# Patient Record
Sex: FEMALE | Race: WHITE | NOT HISPANIC OR LATINO | Employment: OTHER | URBAN - METROPOLITAN AREA
[De-identification: names, ages, dates, MRNs, and addresses within clinical notes are randomized per-mention and may not be internally consistent; named-entity substitution may affect disease eponyms.]

---

## 2017-03-09 PROBLEM — E88.810 INSULIN RESISTANCE SYNDROME: Status: ACTIVE | Noted: 2017-03-09

## 2017-03-23 DIAGNOSIS — K21.9 GASTROESOPHAGEAL REFLUX DISEASE, ESOPHAGITIS PRESENCE NOT SPECIFIED: Primary | ICD-10-CM

## 2017-08-18 DIAGNOSIS — K21.9 GASTROESOPHAGEAL REFLUX DISEASE, ESOPHAGITIS PRESENCE NOT SPECIFIED: ICD-10-CM

## 2018-09-10 DIAGNOSIS — K21.9 GASTROESOPHAGEAL REFLUX DISEASE, ESOPHAGITIS PRESENCE NOT SPECIFIED: ICD-10-CM

## 2019-01-07 PROBLEM — M47.814 SPONDYLOSIS OF THORACIC REGION WITHOUT MYELOPATHY OR RADICULOPATHY: Status: ACTIVE | Noted: 2019-01-07

## 2019-01-30 PROBLEM — M54.17 LUMBOSACRAL RADICULOPATHY: Status: ACTIVE | Noted: 2019-01-30

## 2019-08-01 ENCOUNTER — TELEPHONE (OUTPATIENT)
Dept: GASTROENTEROLOGY | Facility: CLINIC | Age: 39
End: 2019-08-01

## 2019-08-01 NOTE — TELEPHONE ENCOUNTER
----- Message from Rebecca Mensah sent at 8/1/2019  1:21 PM CDT -----  Contact: Pt Portal Request    From: Miri Earl     Sent: 7/30/2019 10:05 AM CDT       To: Patient Appointment Schedule Request Mailing List  Subject: Appointment Request    Appointment Request From: Miri Earl    With Provider: arun pérez    Preferred Date Range: Any    Preferred Times: Any time    Reason for visit: time for visit    Comments:  if doing EGD/colonoscopy can it get done before Sept 1st.

## 2019-10-10 DIAGNOSIS — K21.9 GASTROESOPHAGEAL REFLUX DISEASE, ESOPHAGITIS PRESENCE NOT SPECIFIED: ICD-10-CM

## 2020-09-03 ENCOUNTER — TELEPHONE (OUTPATIENT)
Dept: GASTROENTEROLOGY | Facility: CLINIC | Age: 40
End: 2020-09-03

## 2020-09-08 ENCOUNTER — TELEPHONE (OUTPATIENT)
Dept: GASTROENTEROLOGY | Facility: CLINIC | Age: 40
End: 2020-09-08

## 2020-09-14 ENCOUNTER — DOCUMENTATION ONLY (OUTPATIENT)
Dept: GASTROENTEROLOGY | Facility: CLINIC | Age: 40
End: 2020-09-14

## 2020-09-14 ENCOUNTER — PATIENT MESSAGE (OUTPATIENT)
Dept: GASTROENTEROLOGY | Facility: CLINIC | Age: 40
End: 2020-09-14

## 2020-09-14 ENCOUNTER — OFFICE VISIT (OUTPATIENT)
Dept: GASTROENTEROLOGY | Facility: CLINIC | Age: 40
End: 2020-09-14
Payer: COMMERCIAL

## 2020-09-14 ENCOUNTER — TELEPHONE (OUTPATIENT)
Dept: GASTROENTEROLOGY | Facility: CLINIC | Age: 40
End: 2020-09-14

## 2020-09-14 VITALS — WEIGHT: 290 LBS | HEIGHT: 68 IN | BODY MASS INDEX: 43.95 KG/M2

## 2020-09-14 DIAGNOSIS — Z90.49 S/P CHOLECYSTECTOMY: ICD-10-CM

## 2020-09-14 DIAGNOSIS — R13.14 PHARYNGOESOPHAGEAL DYSPHAGIA: Primary | ICD-10-CM

## 2020-09-14 DIAGNOSIS — R07.9 NONSPECIFIC CHEST PAIN: ICD-10-CM

## 2020-09-14 DIAGNOSIS — R19.7 DIARRHEA, UNSPECIFIED TYPE: ICD-10-CM

## 2020-09-14 DIAGNOSIS — R12 HEARTBURN: ICD-10-CM

## 2020-09-14 DIAGNOSIS — R79.89 ELEVATED LFTS: ICD-10-CM

## 2020-09-14 DIAGNOSIS — Z86.010 HISTORY OF COLON POLYPS: ICD-10-CM

## 2020-09-14 DIAGNOSIS — Z87.898 HISTORY OF SHORTNESS OF BREATH: ICD-10-CM

## 2020-09-14 PROCEDURE — 99204 PR OFFICE/OUTPT VISIT, NEW, LEVL IV, 45-59 MIN: ICD-10-PCS | Mod: 95,,, | Performed by: NURSE PRACTITIONER

## 2020-09-14 PROCEDURE — 99204 OFFICE O/P NEW MOD 45 MIN: CPT | Mod: 95,,, | Performed by: NURSE PRACTITIONER

## 2020-09-14 RX ORDER — MONTELUKAST SODIUM 4 MG/1
1 TABLET, CHEWABLE ORAL 2 TIMES DAILY
Qty: 60 TABLET | Refills: 2 | Status: SHIPPED | OUTPATIENT
Start: 2020-09-14 | End: 2020-12-04 | Stop reason: SDUPTHER

## 2020-09-14 RX ORDER — ESOMEPRAZOLE MAGNESIUM 40 MG/1
40 CAPSULE, DELAYED RELEASE ORAL
Qty: 30 CAPSULE | Refills: 3 | Status: SHIPPED | OUTPATIENT
Start: 2020-09-14 | End: 2020-12-04 | Stop reason: SDUPTHER

## 2020-09-14 NOTE — PATIENT INSTRUCTIONS
Uncertain Causes of Diarrhea (Adult)    Diarrhea is when stools are loose and watery. This can be caused by:  · Viral infections  · Bacterial infections  · Food poisoning  · Parasites  · Irritable bowel syndrome (IBS)  · Inflammatory bowel diseases such as ulcerative colitis, Crohn's disease, and celiac disease  · Food intolerance, such as to lactose, the sugar found in milk and milk products  · Reaction to medicines like antibiotics, laxatives, cancer drugs, and antacids  Along with diarrhea, you may also have:  · Abdominal pain and cramping  · Nausea and vomiting  · Loss of bowel control  · Fever and chills  · Bloody stools  In some cases, antibiotics may help to treat diarrhea. You may have a stool sample test. This is done to see what is causing your diarrhea, and if antibiotics will help treat it. The results of a stool sample test may take up to 2 days. The healthcare provider may not give you antibiotics until he or she has the stool test results.  Diarrhea can cause dehydration. This is the loss of too much water and other fluids from the body. When this occurs, body fluid must be replaced. This can be done with oral rehydration solutions. Oral rehydration solutions are available at drugstores and grocery stores without a prescription.  Home care  Follow all instructions given by your healthcare provider. Rest at home for the next 24 hours, or until you feel better. Avoid caffeine, tobacco, and alcohol. These can make diarrhea, cramping, and pain worse.  If taking medicines:  · Dont take over-the-counter diarrhea or nausea medicines unless your healthcare provider tells you to.  · You may use acetaminophen or NSAID medicines like ibuprofen or naproxen to reduce pain and fever. Dont use these if you have chronic liver or kidney disease, or ever had a stomach ulcer or gastrointestinal bleeding. Don't use NSAID medicines if you are already taking one for another condition (like arthritis) or are on daily  aspirin therapy (such as for heart disease or after a stroke). Talk with your healthcare provider first.  · If antibiotics were prescribed, be sure you take them until they are finished. Dont stop taking them even when you feel better. Antibiotics must be taken as a full course.  To prevent the spread of illness:  · Remember that washing with soap and water and using alcohol-based  is the best way to prevent the spread of infection.  · Clean the toilet after each use.  · Wash your hands before eating.  · Wash your hands before and after preparing food. Keep in mind that people with diarrhea or vomiting should not prepare food for others.  · Wash your hands after using cutting boards, countertops, and knives that have been in contact with raw foods.  · Wash and then peel fruits and vegetables.  · Keep uncooked meats away from cooked and ready-to-eat foods.  · Use a food thermometer when cooking. Cook poultry to at least 165°F (74°C). Cook ground meat (beef, veal, pork, lamb) to at least 160°F (71°C). Cook fresh beef, veal, lamb, and pork to at least 145°F (63°C).  · Dont eat raw or undercooked eggs (poached or narayan side up), poultry, meat, or unpasteurized milk and juices.  Food and drinks  The main goal while treating vomiting or diarrhea is to prevent dehydration. This is done by taking small amounts of liquids often.  · Keep in mind that liquids are more important than food right now.  · Drink only small amounts of liquids at a time.  · Dont force yourself to eat, especially if you are having cramping, vomiting, or diarrhea. Dont eat large amounts at a time, even if you are hungry.  · If you eat, avoid fatty, greasy, spicy, or fried foods.  · Dont eat dairy foods or drink milk if you have diarrhea. These can make diarrhea worse.  During the first 24 hours you can try:  · Oral rehydration solutions. Do not use sports drinks. They have too much sugar and not enough electrolytes.  · Soft drinks without  caffeine  · Ginger ale  · Water (plain or flavored)  · Decaf tea or coffee  · Clear broth, consommé, or bouillon  · Gelatin, popsicles, or frozen fruit juice bars  The second 24 hours, if you are feeling better, you can add:  · Hot cereal, plain toast, bread, rolls, or crackers  · Plain noodles, rice, mashed potatoes, chicken noodle soup, or rice soup  · Unsweetened canned fruit (no pineapple)  · Bananas  As you recover:  · Limit fat intake to less than 15 grams per day. Dont eat margarine, butter, oils, mayonnaise, sauces, gravies, fried foods, peanut butter, meat, poultry, or fish.  · Limit fiber. Dont eat raw or cooked vegetables, fresh fruits except bananas, or bran cereals.  · Limit caffeine and chocolate.  · Limit dairy.  · Dont use spices or seasonings except salt.  · Go back to your normal diet over time, as you feel better and your symptoms improve.  · If the symptoms come back, go back to a simple diet or clear liquids.  Follow-up care  Follow up with your healthcare provider, or as advised. If a stool sample was taken or cultures were done, call the healthcare provider for the results as instructed.  Call 911  Call 911 if you have any of these symptoms:  · Trouble breathing  · Confusion  · Extreme drowsiness or trouble walking  · Loss of consciousness  · Rapid heart rate  · Chest pain  · Stiff neck  · Seizure  When to seek medical advice  Call your healthcare provider right away if any of these occur:  · Abdominal pain that gets worse  · Constant lower right abdominal pain  · Continued vomiting and inability to keep liquids down  · Diarrhea more than 5 times a day  · Blood in vomit or stool  · Dark urine or no urine for 8 hours, dry mouth and tongue, tiredness, weakness, or dizziness  · Drowsiness  · New rash  · You dont get better in 2 to 3 days  · Fever of 100.4°F (38°C) or higher that doesnt get lower with medicine  Date Last Reviewed: 1/3/2016  © 2867-7948 [employer]. [address] [place] [address]  [place] [address]. All rights reserved. This information is not intended as a substitute for professional medical care. Always follow your healthcare professional's instructions.          Irritable Bowel Syndrome    Irritable bowel syndrome (IBS) is a disorder of the intestines. It is not a disease, but a group of symptoms caused by changes in the way the intestines work. It is fairly common, but the cause is not well understood.  Symptoms of IBS include:  · Abdominal pain, discomfort, and cramping  · Diarrhea  · Constipation or dry, hard stools  · Mucous stool  · Bloating  · Feeling of incomplete bowel movements  It usually results in one of 3 patterns of symptoms:  · Chronic abdominal pain and constipation  · Recurring episodes of diarrhea, with or without pain  · Alternating diarrhea and constipation  Home care  The goal of treatment is to control and relieve your symptoms, so you can lead a full and active life. There is no cure for IBS. But it can be managed.  Diet  Your diet did not cause your IBS, but it can affect it. No one diet works for everyone. Finding the best foods for you may take trial and error. Keep a food log to help find what foods made your symptoms worse. Below are some tips that may help you.  · Eat more slowly. Eat smaller amounts at a time, but more often. Remember, you can always eat more, but cannot eat less once youve eaten too much.  · High-fiber foods are complicated. While they may help relieve constipation, they can make your bloating, cramping, gas, and diarrhea worse.  · Eat less sugar.  · Try cutting out dairy products. Sometimes this helps.  · Try cutting out foods that are high in fat and fatty meats.  · You can control bloating or passing excess gas. Be careful with gassy vegetables and fruits like beans, cabbage, broccoli, and cauliflower.  · Be careful with carbonated beverages and fruit juices. They can make your bloating and diarrhea worse.  · Caffeine,  alcohol, and stimulants can make symptoms worse. These include coffee, tea, sodas, energy drinks, and chocolate.  Lifestyle  · Look for factors that seem to worsen your symptoms. These include stress and emotions.   · Although stress does not cause IBS, it may trigger flare-ups. Counseling can help you learn to handle stress. So can self-help measures like exercise, yoga, and meditation.  · Depression can occur along with IBS. Your healthcare provider may prescribe antidepressant medicine. This may help with diarrhea, constipation, and cramping, as well as with symptoms of depression.  · Smoking doesn't cause IBS, but can make the symptoms worse.  Medicines  Your healthcare provider may prescribe medicines. Take them as directed. For acute flare-ups of your illness, your provider may give you prescription medicines.  · Check with your healthcare provider before taking any medicines for diarrhea.  · Avoid anti-inflammatory medicines like ibuprofen or naproxen.  · Consider nutritional supplements. This is especially true if your diarrhea is prolonged, or you aren't eating or are losing weight  Follow-up care  Follow up with your healthcare provider, or as advised. If a stool sample was taken or cultures were done, you will be told if your treatment needs to change. You can call as directed for the results.  When to seek medical advice  Call your healthcare provider right away if any of these occur:  · Abdominal pain gets worse  · Constant abdominal pain moves to the right-lower abdomen  · You can't keep liquids down because of vomiting  · You have severe diarrhea  · You have blood (red or black color) or mucus in your stool  · You feel very weak or dizzy, faint, or have extreme thirst  · You have a fever of 100.4ºF (38.0ºC) or higher, or as directed by your healthcare provider  Date Last Reviewed: 8/31/2015  © 4735-1132 [employer]. [address] [place] [address], [place] [address]. All rights reserved. This  information is not intended as a substitute for professional medical care. Always follow your healthcare professional's instructions.          GERD (Adult)    The esophagus is a tube that carries food from the mouth to the stomach. A valve at the lower end of the esophagus prevents stomach acid from flowing upward. When this valve doesn't work properly, stomach contents may repeatedly flow back up (reflux) into the esophagus. This is called gastroesophageal reflux disease (GERD). GERD can irritate the esophagus. It can cause problems with swallowing or breathing. In severe cases, GERD can cause recurrent pneumonia or other serious problems.  Symptoms of reflux include burning, pressure or sharp pain in the upper abdomen or mid to lower chest. The pain can spread to the neck, back, or shoulder. There may be belching, an acid taste in the back of the throat, chronic cough, or sore throat or hoarseness. GERD symptoms often occur during the day after a big meal. They can also occur at night when lying down.   Home care  Lifestyle changes can help reduce symptoms. If needed, medicines may be prescribed. Symptoms often improve with treatment, but if treatment is stopped, the symptoms often return after a few months. So most persons with GERD will need to continue treatment.  Lifestyle changes  · Limit or avoid fatty, fried, and spicy foods, as well as coffee, chocolate, mint, and foods with high acid content such as tomatoes and citrus fruit and juices (orange, grapefruit, lemon).  · Dont eat large meals, especially at night. Frequent, smaller meals are best. Do not lie down right after eating. And dont eat anything 3 hours before going to bed.  · Avoid drinking alcohol and smoking. As much as possible, stay away from second hand smoke.  · If you are overweight, losing weight will reduce symptoms.   · Avoid wearing tight clothing around your stomach area.  · If your symptoms occur during sleep, use a foam wedge to  "elevate your upper body (not just your head.) Or, place 4" blocks under the head of your bed.  Medicines  If needed, medicines can help relieve the symptoms of GERD and prevent damage to the esophagus. Discuss a medicine plan with your healthcare provider. This may include one or more of the following medicines:  · Antacids to help neutralize the normal acids in your stomach.  · Acid blockers (H2 blockers) to decrease acid production.  · Acid inhibitors (PPIs) to decrease acid production in a different way than the blockers. They may work better, but can take a little longer to take effect.  Take an antacid 30-60 minutes after eating and at bedtime, but not at the same time as an acid blocker.  Try not to take medicines such as ibuprofen and aspirin. If you are taking aspirin for your heart or other medical reasons, talk to your healthcare provider about stopping it.  Follow-up care  Follow up with your healthcare provider or as advised by our staff.  When to seek medical advice  Call your healthcare provider if any of the following occur:  · Stomach pain gets worse or moves to the lower right abdomen (appendix area)  · Chest pain appears or gets worse, or spreads to the back, neck, shoulder, or arm  · Frequent vomiting (cant keep down liquids)  · Blood in the stool or vomit (red or black in color)  · Feeling weak or dizzy  · Fever of 100.4ºF (38ºC) or higher, or as directed by your healthcare provider  Date Last Reviewed: 6/23/2015  © 2324-1524 [employer]. [address] [place] [address]. All rights reserved. This information is not intended as a substitute for professional medical care. Always follow your healthcare professional's instructions.          Discharge Instructions: Eating a Soft Diet  You have been prescribed a soft diet (also called gastrointestinal soft diet or bland diet). This reduces the amount of work your digestive tract has to do. It also reduces the chance that your " digestive tract will be irritated by the food you eat. A soft diet is prescribed for people with digestive problems. The diet consists of foods that are tender, mildly seasoned, and easy to digest. While on this diet, you should not eat fried or spicy foods, or raw fruits and vegetables. Also avoid alcoholic beverages.  General guidelines  · Eat in a calm, relaxed atmosphere. How you eat may be as important as what you eat. Dont rush while eating. Chew your food slowly and thoroughly, and swallow slowly.  · Eat small frequent meals throughout the day, but dont eat within 2 hours of bedtime.  · Avoid any foods that cause discomfort.  · Dont use NSAIDs (nonsteroidal anti-inflammatory drugs), such as aspirin, and ibuprofen. Also avoid medicine that contain aspirin. NSAIDs can cause ulcers and delay or prevent ulcer healing.  · Use antacids as needed, but keep in mind that magnesium-containing antacids may cause diarrhea.  Foods to eat  · Cream of wheat and cream of rice  · Cooked white rice  · Mashed potatoes, and boiled potatoes without skin  · Plain pasta and noodles  · Plain white crackers (such as no-salt soda crackers)  · White bread  · Applesauce  · Cooked fruits without skins or seeds  · Mild juices, such as apple and grape  · Bananas  · Cooked or mashed vegetables without stems and seeds  ? Carrots  ? Summer squash (zucchini, yellow squash)  ? Winter squash (acorn, butternut, spaghetti squash)  · Cottage cheese  · Mild hard or soft cheeses  · Custard  · Yogurt without seeds or nuts  · Milk (you may need lactose-free milk)  · Ice cream without seeds or nuts  · Smooth peanut butter  · Eggs  · Fish, turkey, chicken, or other meat that is not tough or stringy  · Tofu  Foods to avoid  · Nuts and seeds  · Snack foods, such as the following:  ? Chocolate-containing snacks, candy, pastries, or cakes.  ? Potato chips (plain, barbecued, or other flavors)  ? Taco chips or nachos  ? Corn chips  ? Popcorn, popcorn cakes,  or rice cakes  ? Crackers with nuts, seeds, or spicy seasonings  ? French fries  · Fried or greasy foods  · Whole-grain breads, rolls, and crackers  · Breads and rolls with nuts, seeds, or bran  · Bran and granola cereals  · Berries with seeds, such as strawberries, raspberries, and blackberries  · Acidic fruits, such as oranges, grapefruits, jai, limes, and pineapples  · Raw vegetables  · Mild or hot peppers  · Sauerkraut and pickled vegetables  · Tomatoes or tomato products, such as tomato paste, tomato sauce, and tomato juice  · Barbecue sauce  · Spicy or flavored cheeses, such as jalapeño and black pepper cheese  · Crunchy peanut butter  · Dried cooked beans, such as ochoa, kidney, or navy beans  · The following meats:  ? Fried or greasy meats  ? Processed, spicy meats, such as sausage, corcoran, ham, and lunch meats  ? Ribs and other meats with barbecue sauce  ? Tough or stringy meats, such as corned beef or beef jerky  Fluids to avoid  · Alcoholic beverages  · Coffee and regular teas  · Юлия and other drinks with caffeine  · Cranberry, orange, pineapple, and grapefruit juice  · Lemonade  · Vegetable juice  · Whole milk, if you are lactose intolerant  Follow-up  Make a follow-up appointment with a dietitian as directed by our staff.  Date Last Reviewed: 6/21/2015  © 5274-8076 [employer]. [address] [place] [address]. All rights reserved. This information is not intended as a substitute for professional medical care. Always follow your healthcare professional's instructions.

## 2020-09-14 NOTE — PROGRESS NOTES
Subjective:       Patient ID: Miri Earl is a 40 y.o. female Body mass index is 44.09 kg/m².    Chief Complaint: GI Problem (dysphagia)    This patient is new to me.  Established patient of Dr. Colunga (last in 2014).     The patient location is: home in [place]. I verified patient name and date of birth. Patient provided current height and weight measurements.  The chief complaint leading to consultation is: heartburn, dysphagia and due for colonoscopy    Visit type: audiovisual    Face to Face time with patient: 17 minutes  32 minutes of total time spent on the encounter, which includes face to face time and non-face to face time preparing to see the patient (eg, review of tests), Obtaining and/or reviewing separately obtained history, Documenting clinical information in the electronic or other health record, Independently interpreting results (not separately reported) and communicating results to the patient/family/caregiver, or Care coordination (not separately reported).     Each patient to whom he or she provides medical services by telemedicine is:  (1) informed of the relationship between the physician and patient and the respective role of any other health care provider with respect to management of the patient; and (2) notified that he or she may decline to receive medical services by telemedicine and may withdraw from such care at any time.    GI Problem  The primary symptoms include nausea (occasional; zofran prn) and diarrhea. Primary symptoms do not include fever, weight loss (gained weight recently), fatigue, abdominal pain, vomiting, melena, hematemesis or hematochezia.   The diarrhea began more than 1 week ago (chornic, reports diagnosed with IBS in the past by Dr. Colunga). The diarrhea is watery. Daily occurrences: 2-8 times a day; worse with dairy (she avoids dairy) Risk factors: denies recent antibiotic/hospitalization, foreign travel, ill contacts, or suspect food intake.   The illness is also  significant for dysphagia (occurs with food & pills; denies problems with liquids; egd with dilation helped in the past). The illness does not include chills, odynophagia or constipation. Significant associated medical issues include GERD (recurred since off zantac, occurs anytime she eats; prilosec 40 mg once daily; PAST; zantac stopped in 4/2020 due to FDA recall, nexium helped but stopped in the past due to cost (wants to retry it)) and irritable bowel syndrome. Associated medical issues do not include inflammatory bowel disease.     Review of Systems   Constitutional: Negative for appetite change, chills, fatigue, fever and weight loss (gained weight recently).        Reports takes norco PRN- takes maybe once every 2 months; also has pain pump in place with morphine   HENT: Positive for trouble swallowing. Negative for sore throat.    Respiratory: Positive for shortness of breath (intermittent, uses inhalers; denies currently). Negative for cough and choking.    Cardiovascular: Positive for chest pain (occasional; seeing cardiologist; denies currently).   Gastrointestinal: Positive for diarrhea, dysphagia (occurs with food & pills; denies problems with liquids; egd with dilation helped in the past) and nausea (occasional; zofran prn). Negative for abdominal pain, anal bleeding, blood in stool, constipation, hematemesis, hematochezia, melena, rectal pain and vomiting.   Neurological: Negative for weakness.       No LMP recorded. Patient has had a hysterectomy.  Past Medical History:   Diagnosis Date    Anxiety     Arthritis     Asthma     Breast cyst     Colon polyp     Depression     Elevated liver enzymes     Fibrocystic breast     Fibromyalgia     GERD (gastroesophageal reflux disease)     History of Chiari malformation     History of colitis     History of gastritis     Hypertension     Hypokalemia     IBS (irritable bowel syndrome)     Insomnia     MVP (mitral valve prolapse)      Neuropathy     Postmenopausal HRT (hormone replacement therapy)     Seasonal allergies     Sleep apnea with use of continuous positive airway pressure (CPAP)     Spondylitis      Past Surgical History:   Procedure Laterality Date    APPENDECTOMY  10/15/2009  [place]    BACK SURGERY      BACK SURGERY      xs 4    BACK SURGERY       SECTION      x 3    CHOLECYSTECTOMY      COLONOSCOPY    [place]    COLONOSCOPY  08/15/2014    Dr. Colunga: 1 colon polyp removed, hemorrhoids, Mild colonic spasm consistent with irritable bowel syndrome; repeat in 5 years for surveillance; biopsy: TUBULAR ADENOMA.    COLONOSCOPY W/ POLYPECTOMY  10/31/2008  [facility]    2 mm polyp in the sigmoid colon, TUBULAR ADENOMATOUS POLYP.  Erythematous mucosa rectum, proctitis (prep  proctitis?). Irritable bowel syndrome.     ESOPHAGOGASTRODUODENOSCOPY  08/15/2014    Dr. Colunga: Reflux esophagitis, No endoscopic esophageal abnormality to explain patient's dysphagia. Esophagus dilated, 54FR, history/examination was suspicious for an esophageal spasm; gastritis; biopsy: stomach- unremarkable, bishop test negative    GANGLION CYST EXCISION Right     HYSTERECTOMY  10/2007  [place]    LIVER BIOPSY  10/20/2008    Mild portal mixed leukocytic infiltrate,neutrophils and lymphocytes.  Bile ducts seen, negative for leukocytic infiltration.  Negative for steatohepatitis. Mild sinusoidal dilatation and congestion. Negative for granulomata nor malignancy.  Reticulum stain negative for hepaticfibrosis.  Negative iron stain.  PAS stain, negative for PAS-positive inclusions.    LUMBAR DISCECTOMY      LUMBAR FUSION      MYELOGRAPHY N/A 2019    Procedure: Myelogram lumbar and thoracic;  Surgeon: Hari Rich MD;  Location: [facility];  Service: Radiology;  Laterality: N/A;    neurostimulator removal       OOPHORECTOMY      pain pump      PELVIC LAPAROSCOPY      SELECTIVE INJECTION OF ANESTHETIC AGENT AROUND LUMBAR SPINAL NERVE ROOT BY  TRANSFORAMINAL APPROACH Bilateral 1/30/2019    Procedure: BLOCK, SPINAL NERVE ROOT, LUMBAR, SELECTIVE, TRANSFORAMINAL APPROACH; L3;  Surgeon: Tez Edouard MD;  Location: [facility];  Service: Pain Management;  Laterality: Bilateral;    SPINAL CORD STIMULATOR IMPLANT      UPPER GASTROINTESTINAL ENDOSCOPY  10/31/2008  [facility]    Normal esophagus. Lax LES, non-erosive esophageal reflux (NERD?) Erythematous gastropathy on greater curve.  PRABHU Test performed on 10/31/08 was Negative.      Family History   Problem Relation Age of Onset    Diabetes Mother     Hypertension Mother     Diabetes Father     Hypertension Father     Colon cancer Neg Hx     Crohn's disease Neg Hx     Ulcerative colitis Neg Hx     Stomach cancer Neg Hx     Esophageal cancer Neg Hx      Wt Readings from Last 10 Encounters:   09/14/20 131.5 kg (290 lb)   09/10/20 131.6 kg (290 lb 3.2 oz)   01/30/19 121.8 kg (268 lb 8.3 oz)   01/07/19 118.8 kg (262 lb)   08/24/18 122 kg (269 lb)   09/07/17 122.4 kg (269 lb 12.8 oz)   06/11/17 118.1 kg (260 lb 5.8 oz)   03/09/17 113.1 kg (249 lb 4.8 oz)   01/23/17 110.1 kg (242 lb 11.6 oz)   09/01/16 111.4 kg (245 lb 8 oz)     Lab Results   Component Value Date    WBC 6.63 06/24/2020    HGB 14.3 06/24/2020    HCT 45.1 06/24/2020    MCV 97 06/24/2020     06/24/2020     CMP  Sodium   Date Value Ref Range Status   06/24/2020 141 136 - 145 mmol/L Final   10/15/2015 143 137 - 145 MMOL/L Final     Potassium   Date Value Ref Range Status   06/24/2020 4.3 3.5 - 5.1 mmol/L Final   10/15/2015 4.6 3.5 - 5.1 MMOL/L Final     Chloride   Date Value Ref Range Status   06/24/2020 109 95 - 110 mmol/L Final   10/15/2015 109 (H) 98 - 107 MMOL/L Final     CO2   Date Value Ref Range Status   06/24/2020 26 22 - 31 mmol/L Final     Glucose   Date Value Ref Range Status   06/24/2020 129 (H) 70 - 110 mg/dL Final     Comment:     The ADA recommends the following guidelines for fasting glucose:  Normal:       less than 100  mg/dL  Prediabetes:  100 mg/dL to 125 mg/dL  Diabetes:     126 mg/dL or higher       BUN, Bld   Date Value Ref Range Status   06/24/2020 9 7 - 18 mg/dL Final     Creatinine   Date Value Ref Range Status   06/24/2020 0.70 0.50 - 1.40 mg/dL Final   10/15/2015 0.84 0.52 - 1.04 MG/DL Final     Calcium   Date Value Ref Range Status   06/24/2020 9.5 8.4 - 10.2 mg/dL Final     Total Protein   Date Value Ref Range Status   06/24/2020 6.6 6.0 - 8.4 g/dL Final     Albumin   Date Value Ref Range Status   06/24/2020 3.8 3.5 - 5.2 g/dL Final   10/15/2015 4.0 3.5 - 5.0 G/DL Final     Total Bilirubin   Date Value Ref Range Status   06/24/2020 0.5 0.2 - 1.3 mg/dL Final     Alkaline Phosphatase   Date Value Ref Range Status   06/24/2020 133 38 - 145 U/L Final     AST ([place])   Date Value Ref Range Status   01/19/2016 26 14 - 36 U/L Final     AST   Date Value Ref Range Status   06/24/2020 53 (H) 14 - 36 U/L Final     ALT   Date Value Ref Range Status   06/24/2020 79 (H) 0 - 35 U/L Final     Anion Gap   Date Value Ref Range Status   06/24/2020 6 (L) 8 - 16 mmol/L Final     eGFR if    Date Value Ref Range Status   06/24/2020 >60 >60 mL/min/1.73 m^2 Final     eGFR if non    Date Value Ref Range Status   06/24/2020 >60 >60 mL/min/1.73 m^2 Final     Comment:     Calculation used to obtain the estimated glomerular filtration  rate (eGFR) is the CKD-EPI equation.        Lab Results   Component Value Date    LIPASE 40 01/23/2017     Lab Results   Component Value Date    TSH 0.726 06/24/2020     Reviewed prior medical records including endoscopy history (see surgical history).    Objective:      Physical Exam  Constitutional:       General: She is not in acute distress.     Appearance: She is well-developed.   Pulmonary:      Effort: Pulmonary effort is normal. No respiratory distress.   Skin:     Coloration: Skin is not pale.   Neurological:      Mental Status: She is alert and oriented to person,  place, and time.   Psychiatric:         Speech: Speech normal.         Behavior: Behavior normal.         Thought Content: Thought content normal.         Judgment: Judgment normal.         Assessment:       1. Pharyngoesophageal dysphagia    2. Elevated LFTs    3. Heartburn    4. Diarrhea, unspecified type    5. S/P cholecystectomy    6. History of colon polyps    7. Nonspecific chest pain    8. History of shortness of breath        Plan:       Pharyngoesophageal dysphagia  - schedule EGD, discussed procedure with patient and possible esophageal dilation may be performed during procedure if indicated, patient verbalized understanding  - educated patient to eat smaller more frequent meals and to eat slowly and advised to eat a soft diet.  - possible UGI/esophagram/esophageal manometry if symptoms persist    Elevated LFTs  -     Hepatic function panel; Future; Expected date: 09/14/2020  -     Hepatitis Panel, Acute; Future; Expected date: 09/14/2020  - minimize/avoid alcohol and tylenol products    Heartburn  - DISCONTINUE PRILOSEC DUE TO ALTERNATE THERAPY  - CONTINUE ZOFRAN PRN AS DIRECTED FOR NAUSEA  -  RESTART   esomeprazole (NEXIUM) 40 MG capsule; Take 1 capsule (40 mg total) by mouth before breakfast.  Dispense: 30 capsule; Refill: 3  - schedule EGD, discussed procedure with patient, including risks and benefits, patient verbalized understanding  - avoid/minimize use of NSAIDs- since they can cause GI upset, bleeding and/or ulcers. If NSAID must be taken, recommend take with food.    Diarrhea, unspecified type  -     Pancreatic elastase, fecal; Future; Expected date: 09/14/2020  -     Stool Exam-Ova,Cysts,Parasites; Future; Expected date: 09/14/2020  -     Giardia / Cryptosporidum, EIA; Future; Expected date: 09/14/2020  -     Occult blood x 1, stool; Future; Expected date: 09/14/2020  -     pH, stool; Future; Expected date: 09/14/2020  -     Rotavirus antigen, stool; Future; Expected date: 09/14/2020  -      WBC, Stool; Future; Expected date: 09/14/2020  -     Stool culture; Future; Expected date: 09/14/2020  -     Clostridium difficile EIA; Future; Expected date: 09/14/2020  -     Adenovirus Molecular Detection, PCR, Non-Blood Stool; Future; Expected date: 09/14/2020  -     esomeprazole (NEXIUM) 40 MG capsule; Take 1 capsule (40 mg total) by mouth before breakfast.  Dispense: 30 capsule; Refill: 3  - START    colestipoL (COLESTID) 1 gram Tab; Take 1 tablet (1 g total) by mouth 2 (two) times daily.  Dispense: 60 tablet; Refill: 2  - schedule EGD, discussed procedure with patient, including risks and benefits, patient verbalized understanding  - schedule Colonoscopy, discussed procedure with the patient, including risks and benefits, patient verbalized understanding  - recommended OTC probiotic, such as Align or Culturelle, as directed  - avoid lactose & caffeine  - avoid known triggers    S/P cholecystectomy  -  START   colestipoL (COLESTID) 1 gram Tab; Take 1 tablet (1 g total) by mouth 2 (two) times daily.  Dispense: 60 tablet; Refill: 2    History of colon polyps  - schedule Colonoscopy, discussed procedure with the patient, including risks and benefits, patient verbalized understanding    Nonspecific chest pain & History of shortness of breath  - follow-up with PCP &/or cardiologist for continued evaluation and management  - if experiencing symptoms of headache, chest pain, shortness of breath, and/or blurred vision, recommend going to ER for further evaluation and management    Follow up in about 1 month (around 10/14/2020), or if symptoms worsen or fail to improve.      If no improvement in symptoms or symptoms worsen, call/follow-up at clinic or go to ER.

## 2020-09-15 DIAGNOSIS — Z01.818 PREOP TESTING: ICD-10-CM

## 2020-09-21 ENCOUNTER — LAB VISIT (OUTPATIENT)
Dept: LAB | Facility: HOSPITAL | Age: 40
End: 2020-09-21
Attending: NURSE PRACTITIONER
Payer: COMMERCIAL

## 2020-09-21 DIAGNOSIS — R79.89 ELEVATED LFTS: ICD-10-CM

## 2020-09-21 DIAGNOSIS — R19.7 DIARRHEA, UNSPECIFIED TYPE: ICD-10-CM

## 2020-09-21 LAB
C DIFF GDH STL QL: NEGATIVE
C DIFF TOX A+B STL QL IA: NEGATIVE

## 2020-09-21 PROCEDURE — 82272 OCCULT BLD FECES 1-3 TESTS: CPT

## 2020-09-21 PROCEDURE — 87046 STOOL CULTR AEROBIC BACT EA: CPT

## 2020-09-21 PROCEDURE — 80074 ACUTE HEPATITIS PANEL: CPT

## 2020-09-21 PROCEDURE — 87324 CLOSTRIDIUM AG IA: CPT

## 2020-09-21 PROCEDURE — 80076 HEPATIC FUNCTION PANEL: CPT

## 2020-09-21 PROCEDURE — 82656 EL-1 FECAL QUAL/SEMIQ: CPT

## 2020-09-21 PROCEDURE — 87425 ROTAVIRUS AG IA: CPT

## 2020-09-21 PROCEDURE — 87329 GIARDIA AG IA: CPT

## 2020-09-21 PROCEDURE — 87798 DETECT AGENT NOS DNA AMP: CPT

## 2020-09-21 PROCEDURE — 36415 COLL VENOUS BLD VENIPUNCTURE: CPT | Mod: PO

## 2020-09-21 PROCEDURE — 89055 LEUKOCYTE ASSESSMENT FECAL: CPT

## 2020-09-21 PROCEDURE — 87427 SHIGA-LIKE TOXIN AG IA: CPT

## 2020-09-21 PROCEDURE — 87449 NOS EACH ORGANISM AG IA: CPT

## 2020-09-21 PROCEDURE — 87209 SMEAR COMPLEX STAIN: CPT

## 2020-09-21 PROCEDURE — 83986 ASSAY PH BODY FLUID NOS: CPT

## 2020-09-21 PROCEDURE — 87045 FECES CULTURE AEROBIC BACT: CPT

## 2020-09-22 LAB
ALBUMIN SERPL BCP-MCNC: 3.6 G/DL (ref 3.5–5.2)
ALP SERPL-CCNC: 135 U/L (ref 55–135)
ALT SERPL W/O P-5'-P-CCNC: 60 U/L (ref 10–44)
AST SERPL-CCNC: 31 U/L (ref 10–40)
BILIRUB DIRECT SERPL-MCNC: 0.2 MG/DL (ref 0.1–0.3)
BILIRUB SERPL-MCNC: 0.3 MG/DL (ref 0.1–1)
CRYPTOSP AG STL QL IA: NEGATIVE
G LAMBLIA AG STL QL IA: NEGATIVE
HAV IGM SERPL QL IA: NEGATIVE
HBV CORE IGM SERPL QL IA: NEGATIVE
HBV SURFACE AG SERPL QL IA: NEGATIVE
HCV AB SERPL QL IA: NEGATIVE
OB PNL STL: NEGATIVE
PH STL: NORMAL [PH]
PROT SERPL-MCNC: 7.1 G/DL (ref 6–8.4)
RV AG STL QL IA.RAPID: NEGATIVE
WBC #/AREA STL HPF: NORMAL /[HPF]

## 2020-09-23 LAB
E COLI SXT1 STL QL IA: NEGATIVE
E COLI SXT2 STL QL IA: NEGATIVE
O+P STL MICRO: NORMAL

## 2020-09-24 LAB
ELASTASE 1, FECAL: 334 MCG/G
HADV DNA SERPL QL NAA+PROBE: NEGATIVE
SPECIMEN SOURCE: NORMAL

## 2020-09-27 LAB — BACTERIA STL CULT: NORMAL

## 2020-09-29 ENCOUNTER — TELEPHONE (OUTPATIENT)
Dept: GASTROENTEROLOGY | Facility: CLINIC | Age: 40
End: 2020-09-29

## 2020-09-29 DIAGNOSIS — R79.89 ELEVATED LFTS: Primary | ICD-10-CM

## 2020-09-29 DIAGNOSIS — Z90.49 S/P CHOLECYSTECTOMY: ICD-10-CM

## 2020-09-29 NOTE — TELEPHONE ENCOUNTER
Please call to inform & review the results with the patient- lab results showed liver function levels have improved with only mild elevation in ALT remaining. Repeat blood work in 4-6 weeks to monitor and recommend ultrasound of liver (orders placed). Negative hepatitis panel.  Stool studies showed acidic stool; otherwise, negative/normal results. Acidic stool can indicate difficulty digesting certain dietary items. Recommend to avoid lactose products.    Continue with previous recommendations. If no improvement in symptoms or symptoms worsen, call/follow-up at clinic or go to ER.  Please release results to patient's mychart once you have discussed results and recommendations with patient.  Thanks,

## 2020-10-18 ENCOUNTER — LAB VISIT (OUTPATIENT)
Dept: FAMILY MEDICINE | Facility: CLINIC | Age: 40
End: 2020-10-18
Payer: COMMERCIAL

## 2020-10-18 DIAGNOSIS — Z01.818 PREOP TESTING: ICD-10-CM

## 2020-10-18 LAB — SARS-COV-2 RNA RESP QL NAA+PROBE: NOT DETECTED

## 2020-10-18 PROCEDURE — U0003 INFECTIOUS AGENT DETECTION BY NUCLEIC ACID (DNA OR RNA); SEVERE ACUTE RESPIRATORY SYNDROME CORONAVIRUS 2 (SARS-COV-2) (CORONAVIRUS DISEASE [COVID-19]), AMPLIFIED PROBE TECHNIQUE, MAKING USE OF HIGH THROUGHPUT TECHNOLOGIES AS DESCRIBED BY CMS-2020-01-R: HCPCS

## 2020-10-20 ENCOUNTER — PATIENT MESSAGE (OUTPATIENT)
Dept: GASTROENTEROLOGY | Facility: CLINIC | Age: 40
End: 2020-10-20

## 2020-10-20 NOTE — H&P
History & Physical - Short Stay  Gastroenterology      SUBJECTIVE:     Procedure: Gastroscopy and Colonoscopy    Chief Complaint/Indication for Procedure: Dysphagia.  GERD.  Diarrhea.  Hx colon polyps.    History of Present Illness:  Office Visit    6/22/2020  [facility]   Priya Reagan NP  Internal Medicine  Type 2 diabetes mellitus without complication, without long-term current use of insulin +17 more  Dx  Referred by Aaareferral Self  Reason for Visit      Progress Notes  Priya Reagan NP (Nurse Practitioner)   Internal Medicine   6/22/2020  4:00 PM   Signed     Subjective:       Patient ID: Miri Earl is a 39 y.o. female.     Chief Complaint: No chief complaint on file.     The patient location is: [place], [place]  The chief complaint leading to consultation is: re-establish care, med refills     Visit type: audiovisual     Face to Face time with patient: 28min  37 minutes of total time spent on the encounter, which includes face to face time and non-face to face time preparing to see the patient (eg, review of tests), Obtaining and/or reviewing separately obtained history, Documenting clinical information in the electronic or other health record, Independently interpreting results (not separately reported) and communicating results to the patient/family/caregiver, or Care coordination (not separately reported).      Notes:      Pt presents to re-establish care. Prev saw Dr. Orlando, whom has transitioned to pediatrics, then Dr Palmer. New PCP TBD.     H/o T2DM-not currently requiring insulin. Needs labs. Diet--trying to stick to diabetic diet. BMI of 41-gaining weight. Exercise limited due to chronic lumbar pain. Due for annual eye exam.     TOYA--does not have PAP. On OHRT, and requesting rf. She is a current everyday smoker     Depression / anxiety-sees Daniela Schmid NP with [facility]. On cymbalta 60 mg daily for chronic anxiety and chronic pain and feels that is has  been working well for anxiety and depressive sx.      Reports that she has Arnold chiari malformation. Was previously seeing Dr. Borrego, but has not seen him in a few years. She has been having issues with H/As-chronic with worsening and reports that it is worse at the base of the skull. Also having neck pain and stiffness. She denies radiation of pain, numbness tingling or weakness to upper extremities.     Chronic back pain. She has had 4 surgeries as well as multiple other minor procedures-ESIs, blocks, neurostimulator. Has a pain pump. Also h/o fibromyalgias-followed by pain management-Dr. Edouard.     Due for routine labs. Hyperlipidemia.  On lipitor 10 mg daily. LDL above goal and HDL is low. HTN. Taking Toprol XL 50 mg  and  lisinopril mg daily.     GERD controlled with omeprazole per GI. Reports h/o chronic functional colitis and IBS. Had colonoscopy 7/ 2014 polyp. Due for follow-up TC.     AR/ food allergies/ asthma. Previously followed by allergist but has not seen in a few years. Has been stable on current regimen-Zyrtec, singulair, minimal use of AMANDA nebs required.     Chronic migraine H/As-on maint, no abortive therapy listed. She has been taking both Topamax and Zonegran, both at high doses and says that she was instructed to do so per neurology. I d/w her that these are potentially hazardous medications (anticonvulsants), particularly in combination with some of her other medications, and I will not provide both as they are same class of medications at very high doses     Pt also reports fullness in neck and throat. Reports that it requires more effort to swallow, but no odynophagia. She states that she has noticed increase in size of the anterior neck. She is chronically fatigued. She denies heat or cold intolerance, weight changes, bowel changes, skin or hair changes. There is a family h/o Hashimoto's thyroiditis.        Review of Systems   Constitutional: Positive for fatigue. Negative for  activity change and unexpected weight change.   HENT: Positive for trouble swallowing. Negative for hearing loss and rhinorrhea.    Eyes: Negative for discharge and visual disturbance.   Respiratory: Negative for chest tightness and wheezing.    Cardiovascular: Negative for chest pain and palpitations.   Gastrointestinal: Negative for blood in stool, constipation, diarrhea and vomiting.        Objective:   Physical Exam    Assessment:       1. Type 2 diabetes mellitus without complication, without long-term current use of insulin    2. Essential hypertension    3. Mixed hyperlipidemia    4. Gastritis without bleeding, unspecified chronicity, unspecified gastritis type    5. Colitis    6. Nonspecific colitis    7. Hx of colonic polyps    8. Mild intermittent asthma without complication    9. Anemia due to vitamin B12 deficiency, unspecified B12 deficiency type    10. History of Chiari malformation    11. Worsening headaches    12. Neck pain    13. Thyromegaly    14. Anxiety and depression    15. Fibromyalgia    16. Lumbosacral radiculopathy    17. Irritable bowel syndrome, unspecified type    18. Avitaminosis D        Plan:     Diagnoses and all orders for this visit:     Type 2 diabetes mellitus without complication, without long-term current use of insulin  -     blood sugar diagnostic (CONTOUR NEXT TEST STRIPS) Strp; Check BS four times daily  -     lancets Misc; Use to check BS twice daily.  -     linaGLIPtin (TRADJENTA) 5 mg Tab tablet; Take 1 tablet (5 mg total) by mouth every evening.  -     lisinopriL (PRINIVIL,ZESTRIL) 2.5 MG tablet; Take 1 tablet (2.5 mg total) by mouth every evening.  -     Comprehensive metabolic panel; Future  -     Hemoglobin A1C; Future  -     Microalbumin/creatinine urine ratio; Future     Essential hypertension  -     lisinopriL (PRINIVIL,ZESTRIL) 2.5 MG tablet; Take 1 tablet (2.5 mg total) by mouth every evening.  -     metoprolol succinate (TOPROL-XL) 50 MG 24 hr tablet; Take 1  tablet (50 mg total) by mouth every evening.  -     CBC auto differential; Future  -     Comprehensive metabolic panel; Future     Mixed hyperlipidemia  -     atorvastatin (LIPITOR) 10 MG tablet; Take 1 tablet (10 mg total) by mouth nightly.  -     Comprehensive metabolic panel; Future  -     Lipid Panel; Future     Gastritis without bleeding, unspecified chronicity, unspecified gastritis type  -     omeprazole (PRILOSEC) 40 MG capsule; Take 1 capsule (40 mg total) by mouth every evening.  -     Ambulatory referral/consult to Gastroenterology; Future  -     CBC auto differential; Future     Colitis  -     ondansetron (ZOFRAN) 8 MG tablet; Take 1 tablet (8 mg total) by mouth every 8 (eight) hours.  -     Ambulatory referral/consult to Gastroenterology; Future  -     CBC auto differential; Future     Nonspecific colitis     Hx of colonic polyps  -     Ambulatory referral/consult to Gastroenterology; Future     Mild intermittent asthma without complication  -     albuterol (PROVENTIL HFA) 90 mcg/actuation inhaler; Inhale 2 puffs into the lungs every 4 (four) hours as needed for Wheezing. Rescue  -     budesonide (PULMICORT) 0.5 mg/2 mL nebulizer solution; Take 2 mLs (0.5 mg total) by nebulization 2 (two) times daily as needed.  -     levalbuterol (XOPENEX) 1.25 mg/3 mL nebulizer solution; Take 3 mLs (1.25 mg total) by nebulization 3 (three) times daily as needed for Wheezing. Rescue  -     montelukast (SINGULAIR) 10 mg tablet; Take 1 tablet (10 mg total) by mouth every evening.     Anemia due to vitamin B12 deficiency, unspecified B12 deficiency type  -     cyanocobalamin 1,000 mcg/mL injection; Inject 1 mL (1,000 mcg total) into the muscle every 30 days.  -     CBC auto differential; Future  -     Vitamin B12; Future     History of Chiari malformation  -     MRI Brain Without Contrast; Future     Worsening headaches  -     MRI Brain Without Contrast; Future     Neck pain  -     MRI Brain Without Contrast;  Future     Thyromegaly  -     US Soft Tissue Head Neck Thyroid; Future  -     T4, free; Future  -     TSH; Future     Anxiety and depression     Fibromyalgia  -     diflunisaL (DOLOBID) 500 mg Tab; Take 1 tablet (500 mg total) by mouth 2 (two) times daily.  -     DULoxetine (CYMBALTA) 60 MG capsule; Take 2 capsules (120 mg total) by mouth every evening.     Lumbosacral radiculopathy  -     diflunisaL (DOLOBID) 500 mg Tab; Take 1 tablet (500 mg total) by mouth 2 (two) times daily.  -     DULoxetine (CYMBALTA) 60 MG capsule; Take 2 capsules (120 mg total) by mouth every evening.     Irritable bowel syndrome, unspecified type  -     ondansetron (ZOFRAN) 8 MG tablet; Take 1 tablet (8 mg total) by mouth every 8 (eight) hours.     Avitaminosis D  -     Vitamin D; Future     Other orders  -     EPINEPHrine (EPIPEN) 0.3 mg/0.3 mL AtIn; Inject 0.3 mLs (0.3 mg total) into the muscle once. for 1 dose  -     potassium chloride SA (K-DUR,KLOR-CON) 20 MEQ tablet; Take 1 tablet (20 mEq total) by mouth every evening.  -     topiramate (TOPAMAX) 100 MG tablet; Take 4 tablets (400 mg total) by mouth every evening.  -     Discontinue: estrogens,conjugated,-methyltestosterone 1.25-2.5mg (ESTRATEST) 1.25-2.5 mg per tablet; TAKE 1 TABLET BY MOUTH DAILY     Needing refills. Has extensive hx, complaints/ concerns. Needs routine labs, will check US regarding swallowing difficulty and reported anterior neck prominence. MRI brain follow-up on Chiari malformation, posterior H/As, neck pain. GI imnlefyd-uqglef-pt TC, chronic colitis, IBS. Cont with pain management regarding chronic back pain, fibromyalgia. She has been taking both Topamax and Zonegran for migraine maint, both at high doses and says that she was instructed to do so per neurology. I d/w her that these are potentially hazardous medications (anticonvulsants), particularly in combination with some of her other medications, and I will not provide both as they are same class of  medications at very high doses. She is also on HRT-estratest. She is requesting a rf. She is a current everyday smoker. I advised her that she needs to further d/w GYN as this is high risk in smokers and not something that I typically manage/ maintain. Rfs as above, proceed with work-up and labs and needs face-to-face visit to establish with a primary care physician to discuss results and further POC.              Office Visit    9/14/2020  [facility] - Gastroenterology     MEAGHAN Gandhi  Gastroenterology  Pharyngoesophageal dysphagia +7 more  Dx  GI Problem   ; Referred by Aaareferral Self  Reason for Visit     Progress Notes  MEAGHAN Gandhi (Nurse Practitioner)   Gastroenterology   9/14/2020 10:00 AM   Signed     Subjective:       Patient ID: April D Rajat is a 40 y.o. female Body mass index is 44.09 kg/m².     Chief Complaint: GI Problem (dysphagia)     This patient is new to me.  Established patient of Dr. Colunga (last in 2014).     The patient location is: home in [place]. I verified patient name and date of birth. Patient provided current height and weight measurements.  The chief complaint leading to consultation is: heartburn, dysphagia and due for colonoscopy     Visit type: audiovisual     Face to Face time with patient: 17 minutes  32 minutes of total time spent on the encounter, which includes face to face time and non-face to face time preparing to see the patient (eg, review of tests), Obtaining and/or reviewing separately obtained history, Documenting clinical information in the electronic or other health record, Independently interpreting results (not separately reported) and communicating results to the patient/family/caregiver, or Care coordination (not separately reported).      GI Problem  The primary symptoms include nausea (occasional; zofran prn) and diarrhea. Primary symptoms do not include fever, weight loss (gained weight recently), fatigue, abdominal pain,  vomiting, melena, hematemesis or hematochezia.   The diarrhea began more than 1 week ago (chornic, reports diagnosed with IBS in the past by Dr. Colunga). The diarrhea is watery. Daily occurrences: 2-8 times a day; worse with dairy (she avoids dairy) Risk factors: denies recent antibiotic/hospitalization, foreign travel, ill contacts, or suspect food intake.   The illness is also significant for dysphagia (occurs with food & pills; denies problems with liquids; egd with dilation helped in the past). The illness does not include chills, odynophagia or constipation. Significant associated medical issues include GERD (recurred since off zantac, occurs anytime she eats; prilosec 40 mg once daily; PAST; zantac stopped in 4/2020 due to FDA recall, nexium helped but stopped in the past due to cost (wants to retry it)) and irritable bowel syndrome. Associated medical issues do not include inflammatory bowel disease.      Review of Systems   Constitutional: Negative for appetite change, chills, fatigue, fever and weight loss (gained weight recently).        Reports takes norco PRN- takes maybe once every 2 months; also has pain pump in place with morphine   HENT: Positive for trouble swallowing. Negative for sore throat.    Respiratory: Positive for shortness of breath (intermittent, uses inhalers; denies currently). Negative for cough and choking.    Cardiovascular: Positive for chest pain (occasional; seeing cardiologist; denies currently).   Gastrointestinal: Positive for diarrhea, dysphagia (occurs with food & pills; denies problems with liquids; egd with dilation helped in the past) and nausea (occasional; zofran prn). Negative for abdominal pain, anal bleeding, blood in stool, constipation, hematemesis, hematochezia, melena, rectal pain and vomiting.       Assessment:       1. Pharyngoesophageal dysphagia    2. Elevated LFTs    3. Heartburn    4. Diarrhea, unspecified type    5. S/P cholecystectomy    6. History of colon  polyps    7. Nonspecific chest pain    8. History of shortness of breath        Plan:       Pharyngoesophageal dysphagia  - schedule EGD, discussed procedure with patient and possible esophageal dilation may be performed during procedure if indicated, patient verbalized understanding  - educated patient to eat smaller more frequent meals and to eat slowly and advised to eat a soft diet.  - possible UGI/esophagram/esophageal manometry if symptoms persist     Elevated LFTs  -     Hepatic function panel; Future; Expected date: 09/14/2020  -     Hepatitis Panel, Acute; Future; Expected date: 09/14/2020  - minimize/avoid alcohol and tylenol products     Heartburn  - DISCONTINUE PRILOSEC DUE TO ALTERNATE THERAPY  - CONTINUE ZOFRAN PRN AS DIRECTED FOR NAUSEA  -  RESTART   esomeprazole (NEXIUM) 40 MG capsule; Take 1 capsule (40 mg total) by mouth before breakfast.  Dispense: 30 capsule; Refill: 3  - schedule EGD, discussed procedure with patient, including risks and benefits, patient verbalized understanding  - avoid/minimize use of NSAIDs- since they can cause GI upset, bleeding and/or ulcers. If NSAID must be taken, recommend take with food.     Diarrhea, unspecified type  -     Pancreatic elastase, fecal; Future; Expected date: 09/14/2020  -     Stool Exam-Ova,Cysts,Parasites; Future; Expected date: 09/14/2020  -     Giardia / Cryptosporidum, EIA; Future; Expected date: 09/14/2020  -     Occult blood x 1, stool; Future; Expected date: 09/14/2020  -     pH, stool; Future; Expected date: 09/14/2020  -     Rotavirus antigen, stool; Future; Expected date: 09/14/2020  -     WBC, Stool; Future; Expected date: 09/14/2020  -     Stool culture; Future; Expected date: 09/14/2020  -     Clostridium difficile EIA; Future; Expected date: 09/14/2020  -     Adenovirus Molecular Detection, PCR, Non-Blood Stool; Future; Expected date: 09/14/2020  -     esomeprazole (NEXIUM) 40 MG capsule; Take 1 capsule (40 mg total) by mouth before  breakfast.  Dispense: 30 capsule; Refill: 3  - START    colestipoL (COLESTID) 1 gram Tab; Take 1 tablet (1 g total) by mouth 2 (two) times daily.  Dispense: 60 tablet; Refill: 2  - schedule EGD, discussed procedure with patient, including risks and benefits, patient verbalized understanding  - schedule Colonoscopy, discussed procedure with the patient, including risks and benefits, patient verbalized understanding  - recommended OTC probiotic, such as Align or Culturelle, as directed  - avoid lactose & caffeine  - avoid known triggers     S/P cholecystectomy  -  START   colestipoL (COLESTID) 1 gram Tab; Take 1 tablet (1 g total) by mouth 2 (two) times daily.  Dispense: 60 tablet; Refill: 2     History of colon polyps  - schedule Colonoscopy, discussed procedure with the patient, including risks and benefits, patient verbalized understanding     Nonspecific chest pain & History of shortness of breath  - follow-up with PCP &/or cardiologist for continued evaluation and management  - if experiencing symptoms of headache, chest pain, shortness of breath, and/or blurred vision, recommend going to ER for further evaluation and management     Follow up in about 1 month (around 10/14/2020), or if symptoms worsen or fail to improve.                  Wt Readings from Last 20 Encounters:   10/21/20 127 kg (280 lb)   09/24/20 131.5 kg (290 lb)   09/14/20 131.5 kg (290 lb)   09/10/20 131.6 kg (290 lb 3.2 oz)   01/30/19 121.8 kg (268 lb 8.3 oz)   01/07/19 118.8 kg (262 lb)   08/24/18 122 kg (269 lb)   09/07/17 122.4 kg (269 lb 12.8 oz)   06/11/17 118.1 kg (260 lb 5.8 oz)   03/09/17 113.1 kg (249 lb 4.8 oz)   01/23/17 110.1 kg (242 lb 11.6 oz)   09/01/16 111.4 kg (245 lb 8 oz)   02/15/16 126.1 kg (278 lb 1.6 oz)   01/18/16 125.7 kg (277 lb 1.6 oz)   08/25/15 93.1 kg (205 lb 3.2 oz)   08/11/14 113.4 kg (250 lb)       See last EGD 8/15/2014:  Impression:          - Normal oropharynx.                        - Normal esophagus.                         - Reflux esophagitis.                        - Non-erosive esophageal reflux (NERD) disease                        present.                        - Z-line regular, 37 cm from the incisors.                        - The history/examination was suspicious for an                        esophageal spasm.                        - No endoscopic esophageal abnormality to explain                        patient's dysphagia. Esophagus dilated, 54 Fr.                        - Normal gastric fundus and gastric body.                        - Erythematous mucosa in the antrum. Biopsied.                        - Normal examined duodenum.   Recommendation:      - Perform a colonoscopy as previously scheduled.                        - Observe patient's clinical course following                        today's procedure with therapeutic intervention.                        - Await pathology and CLOtest results.                        - Follow an antireflux regimen.                        - Continue present medications (omeprazole 40 mg).                        - Add Zantac (ranitidine) 300 mg PO nightly, for 2-3                        months.                        - Call the [facility] clinic in 2 weeks for reports (if                        you haven't heard from us sooner) [phone].   Earl Colunga MD   8/15/2014       See last colonoscopy 8/15/2014:  Impression:          - One 1 mm polyp in the cecum. Resected and                        retrieved.                        - Internal hemorrhoids.                        - Mild colonic spasm consistent with irritable bowel                        syndrome.                        - The examination was otherwise normal.                        - The examined portion of the ileum was normal.   Recommendation:      - Discharge patient to home.                        - Await pathology results.                        - If the pathology report reveals adenomatous                         tissue, then repeat the colonoscopy for surveillance                        in 5 years.                        - If the pathology report indicates hyperplastic                        polyp, then repeat colonoscopy for surveillance in                        7-8 years.                        - High fiber diet.                        - Take a PROBIOTIC, such as a carton of GREEK YOGURT                        (Chobani or Oikos, or Activia or Dannon); or tablets                        of ALIGN or CULTURELLE or BLACK-Q (all                        non-prescription), every day for a month.                        - Call the G[facility] clinic in 2 weeks for reports (if                        you haven't heard from us sooner) [phone].                        - Continue present medications.                        - Patient has a contact number available for                        emergencies. The signs and symptoms of potential                        delayed complications were discussed with the                        patient. Return to normal activities tomorrow.                        Written discharge instructions were provided to the                        patient.   Earl Colunga MD   8/15/2014    SPECIMEN  1) Antrum.  2) Cecal polyp.  FINAL PATHOLOGIC DIAGNOSIS  1. FRAGMENTS OF NONNEOPLASTIC GASTRIC MUCOSA WITH NO ACTIVE INFLAMMATION, EVIDENCE  OF H. PYLORI OR DYSPLASIA IDENTIFIED.  2. TUBULAR ADENOMA.        PTA Medications   Medication Sig    atorvastatin (LIPITOR) 10 MG tablet TAKE 1 TABLET(10 MG) BY MOUTH EVERY NIGHT    cetirizine (ZYRTEC) 10 MG tablet TAKE 1 TABLET BY MOUTH DAILY    colestipoL (COLESTID) 1 gram Tab Take 1 tablet (1 g total) by mouth 2 (two) times daily.    cyanocobalamin 1,000 mcg/mL injection INJECT ONE ML IN THE MUSCLE EVERY 30 DAYS    diflunisaL (DOLOBID) 500 mg Tab Take 1 tablet (500 mg total) by mouth 2 (two) times daily.    DULoxetine (CYMBALTA) 60 MG capsule TAKE 2  CAPSULES(120 MG) BY MOUTH EVERY EVENING    esomeprazole (NEXIUM) 40 MG capsule Take 1 capsule (40 mg total) by mouth before breakfast.    estrogens,conjugated,-methyltestosterone 1.25-2.5mg (ESTRATEST) 1.25-2.5 mg per tablet TAKE 1 TABLET BY MOUTH DAILY    gabapentin (NEURONTIN) 600 MG tablet TAKE 2 TABLETS(1200 MG) BY MOUTH THREE TIMES DAILY    hydrocodone-acetaminophen 10-325mg (NORCO)  mg Tab Take 1 tablet by mouth every 8 (eight) hours as needed.     lisinopriL (PRINIVIL,ZESTRIL) 2.5 MG tablet Take 1 tablet (2.5 mg total) by mouth every evening.    metoprolol succinate (TOPROL-XL) 50 MG 24 hr tablet TAKE 1 TABLET(50 MG) BY MOUTH EVERY EVENING    montelukast (SINGULAIR) 10 mg tablet TAKE 1 TABLET(10 MG) BY MOUTH EVERY EVENING    morphine 100 mg/100 mL (1 mg/mL) infusion 3.6 mcg/kg/hr continuous. Intrathecal administration per pain pump    ondansetron (ZOFRAN) 8 MG tablet Take 1 tablet (8 mg total) by mouth every 8 (eight) hours.    potassium chloride SA (K-DUR,KLOR-CON) 20 MEQ tablet TAKE 1 TABLET(20 MEQ) BY MOUTH EVERY EVENING    topiramate (TOPAMAX) 100 MG tablet TAKE 4 TABLETS(400 MG) BY MOUTH EVERY EVENING    TRADJENTA 5 mg Tab tablet TAKE 1 TABLET(5 MG) BY MOUTH EVERY EVENING    zonisamide (ZONEGRAN) 100 MG Cap Take 4 capsules (400 mg total) by mouth once daily.    albuterol (PROVENTIL HFA) 90 mcg/actuation inhaler Inhale 2 puffs into the lungs every 4 (four) hours as needed for Wheezing. Rescue (Patient not taking: Reported on 9/14/2020)    blood sugar diagnostic (CONTOUR NEXT TEST STRIPS) Strp Check BS four times daily    blood-glucose meter Misc Use to check blood glucose levels.    budesonide (PULMICORT) 0.5 mg/2 mL nebulizer solution Take 2 mLs (0.5 mg total) by nebulization 2 (two) times daily as needed. (Patient not taking: Reported on 9/14/2020)    ciclesonide (ALVESCO) 80 mcg/actuation HFAA Inhale 80 mcg into the lungs 2 (two) times daily. Controller    EPINEPHrine (EPIPEN)  0.3 mg/0.3 mL AtIn Inject 0.3 mLs (0.3 mg total) into the muscle once. for 1 dose (Patient not taking: Reported on 2020)    lancets Misc Use to check BS twice daily.    levalbuterol (XOPENEX) 1.25 mg/3 mL nebulizer solution Take 3 mLs (1.25 mg total) by nebulization 3 (three) times daily as needed for Wheezing. Rescue (Patient not taking: Reported on 2020)    sodium chloride 0.9% 0.9 % SolP with bupivacaine 0.5% (5 mg/ml) 0.5 % (5 mg/mL) Soln 0.1 % by Epidural route continuous.    SYMBICORT 160-4.5 mcg/actuation HFAA INHALE 2 PUFFS BY MOUTH INTO THE LUNGS TWICE DAILY( CONTROLLER) (Patient not taking: Reported on 2020)    VENTOLIN HFA 90 mcg/actuation inhaler USE 2 PUFFS EVERY 4 HOURS AS NEEDED FOR WHEEZING (Patient not taking: Reported on 2020)       Review of patient's allergies indicates:   Allergen Reactions    Doxycycline Anaphylaxis    Iodinated contrast media Anaphylaxis    Latex, natural rubber Anaphylaxis    Shellfish containing products Anaphylaxis    Morphine Other (See Comments)     Hallucinations        Past Medical History:   Diagnosis Date    Anxiety     Arthritis     Asthma     Breast cyst     Chronic back pain     Colon polyp     Depression     Diabetes mellitus     Elevated liver enzymes     Fibrocystic breast     Fibromyalgia     GERD (gastroesophageal reflux disease)     History of Chiari malformation     History of colitis     History of gastritis     Hypertension     Hypokalemia     IBS (irritable bowel syndrome)     Insomnia     MVP (mitral valve prolapse)     Neuropathy     Postmenopausal HRT (hormone replacement therapy)     Seasonal allergies     Sleep apnea with use of continuous positive airway pressure (CPAP)     Spondylitis      Past Surgical History:   Procedure Laterality Date    APPENDECTOMY  10/15/2009  [facility]    BACK SURGERY      BACK SURGERY      xs 4    BACK SURGERY       SECTION      x 3    CHOLECYSTECTOMY       [place]  2006  [place]    COLONOSCOPY  08/15/2014    Dr. Colunga: 1 colon polyp removed, hemorrhoids, Mild colonic spasm consistent with irritable bowel syndrome; repeat in 5 years for surveillance; biopsy: TUBULAR ADENOMA.    COLONOSCOPY W/ POLYPECTOMY  10/31/2008  [facility]    2 mm polyp in the sigmoid colon, TUBULAR ADENOMATOUS POLYP.  Erythematous mucosa rectum, proctitis (prep  proctitis?). Irritable bowel syndrome.     ESOPHAGOGASTRODUODENOSCOPY  08/15/2014    Dr. Colunga: Reflux esophagitis, No endoscopic esophageal abnormality to explain patient's dysphagia. Esophagus dilated, 54FR, history/examination was suspicious for an esophageal spasm; gastritis; biopsy: stomach- unremarkable, bishop test negative    GANGLION CYST EXCISION Right     HYSTERECTOMY  10/2007  [facility]    LIVER BIOPSY  10/20/2008    Mild portal mixed leukocytic infiltrate,neutrophils and lymphocytes.  Bile ducts seen, negative for leukocytic infiltration.  Negative for steatohepatitis. Mild sinusoidal dilatation and congestion. Negative for granulomata nor malignancy.  Reticulum stain negative for hepaticfibrosis.  Negative iron stain.  PAS stain, negative for PAS-positive inclusions.    LUMBAR DISCECTOMY      LUMBAR FUSION      MYELOGRAPHY N/A 1/7/2019    Procedure: Myelogram lumbar and thoracic;  Surgeon: Hari Rich MD;  Location: [facility];  Service: Radiology;  Laterality: N/A;    neurostimulator removal       OOPHORECTOMY      pain pump      PELVIC LAPAROSCOPY      SELECTIVE INJECTION OF ANESTHETIC AGENT AROUND LUMBAR SPINAL NERVE ROOT BY TRANSFORAMINAL APPROACH Bilateral 1/30/2019    Procedure: BLOCK, SPINAL NERVE ROOT, LUMBAR, SELECTIVE, TRANSFORAMINAL APPROACH; L3;  Surgeon: Tez Edouard MD;  Location: [facility];  Service: Pain Management;  Laterality: Bilateral;    SPINAL CORD STIMULATOR IMPLANT      UPPER GASTROINTESTINAL ENDOSCOPY  10/31/2008  [facility]    Normal esophagus. Lax LES, non-erosive esophageal reflux  "(NERD?) Erythematous gastropathy on greater curve.  PRABHU Test performed on 10/31/08 was Negative.      Family History   Problem Relation Age of Onset    Diabetes Mother     Hypertension Mother     Diabetes Father     Hypertension Father     Colon cancer Neg Hx     Crohn's disease Neg Hx     Ulcerative colitis Neg Hx     Stomach cancer Neg Hx     Esophageal cancer Neg Hx      Social History     Tobacco Use    Smoking status: Current Every Day Smoker     Packs/day: 1.00     Years: 17.00     Pack years: 17.00    Smokeless tobacco: Never Used   Substance Use Topics    Alcohol use: Not Currently    Drug use: Yes     Types: Hydrocodone, Oxycodone         OBJECTIVE:     Vital Signs (Most Recent)  Temp: 97.9 °F (36.6 °C) (10/21/20 1059)  Pulse: 84 (10/21/20 1059)  Resp: 17 (10/21/20 1059)  BP: (!) 108/59 (10/21/20 1059)  SpO2: 96 % (10/21/20 1059)    Physical Exam:  :Ht 5' 8" (1.727 m)   Wt 131.5 kg (290 lb)   BMI 44.09 kg/m²                                                        GENERAL:  Comfortable, in no acute distress.                                 HEENT EXAM:  Nonicteric.  No adenopathy.  Oropharynx is clear.               NECK:  Supple.                                                               LUNGS:  Clear.                                                               CARDIAC:  Regular rate and rhythm.  S1, S2.  No murmur.                      ABDOMEN:  OBESE.  Soft, positive bowel sounds, nontender.  No hepatosplenomegaly or masses.  No rebound or guarding.                                             EXTREMITIES:  No edema.     MENTAL STATUS:  Alert and oriented.    ASSESSMENT/PLAN:     Assessment: Dysphagia.  GERD.  Diarrhea.  Hx colon polyps.    Plan: Gastroscopy and Colonoscopy    Anesthesia Plan:   MAC / General Anaesthesia    ASA Grade: ASA 2 - Patient with mild systemic disease with no functional limitations    MALLAMPATI SCORE: II (hard and soft palate, upper portion of tonsils anduvula " visible)

## 2020-10-21 ENCOUNTER — HOSPITAL ENCOUNTER (OUTPATIENT)
Facility: HOSPITAL | Age: 40
Discharge: HOME | End: 2020-10-21
Attending: INTERNAL MEDICINE | Admitting: INTERNAL MEDICINE
Payer: COMMERCIAL

## 2020-10-21 ENCOUNTER — ANESTHESIA (OUTPATIENT)
Dept: ENDOSCOPY | Facility: HOSPITAL | Age: 40
End: 2020-10-21
Payer: COMMERCIAL

## 2020-10-21 ENCOUNTER — ANESTHESIA EVENT (OUTPATIENT)
Dept: ENDOSCOPY | Facility: HOSPITAL | Age: 40
End: 2020-10-21
Payer: COMMERCIAL

## 2020-10-21 VITALS
SYSTOLIC BLOOD PRESSURE: 127 MMHG | DIASTOLIC BLOOD PRESSURE: 85 MMHG | OXYGEN SATURATION: 97 % | BODY MASS INDEX: 42.44 KG/M2 | HEART RATE: 80 BPM | TEMPERATURE: 97 F | RESPIRATION RATE: 18 BRPM | WEIGHT: 280 LBS | HEIGHT: 68 IN

## 2020-10-21 DIAGNOSIS — R13.10 DYSPHAGIA: ICD-10-CM

## 2020-10-21 LAB
GLUCOSE SERPL-MCNC: 99 MG/DL (ref 70–110)
H PYLORI INDEX VALUE: NEGATIVE

## 2020-10-21 PROCEDURE — 43248 EGD GUIDE WIRE INSERTION: CPT | Mod: ,,, | Performed by: INTERNAL MEDICINE

## 2020-10-21 PROCEDURE — 88342 CHG IMMUNOCYTOCHEMISTRY: ICD-10-PCS | Mod: 26,,, | Performed by: PATHOLOGY

## 2020-10-21 PROCEDURE — 87449 NOS EACH ORGANISM AG IA: CPT

## 2020-10-21 PROCEDURE — 87324 CLOSTRIDIUM AG IA: CPT

## 2020-10-21 PROCEDURE — 43239 EGD BIOPSY SINGLE/MULTIPLE: CPT | Mod: PO | Performed by: INTERNAL MEDICINE

## 2020-10-21 PROCEDURE — 43239 EGD BIOPSY SINGLE/MULTIPLE: CPT | Mod: 59,,, | Performed by: INTERNAL MEDICINE

## 2020-10-21 PROCEDURE — 45385 COLONOSCOPY W/LESION REMOVAL: CPT | Mod: ,,, | Performed by: INTERNAL MEDICINE

## 2020-10-21 PROCEDURE — 87427 SHIGA-LIKE TOXIN AG IA: CPT

## 2020-10-21 PROCEDURE — 45385 PR COLONOSCOPY,REMV LESN,SNARE: ICD-10-PCS | Mod: ,,, | Performed by: INTERNAL MEDICINE

## 2020-10-21 PROCEDURE — 37000008 HC ANESTHESIA 1ST 15 MINUTES: Mod: PO | Performed by: INTERNAL MEDICINE

## 2020-10-21 PROCEDURE — 87209 SMEAR COMPLEX STAIN: CPT

## 2020-10-21 PROCEDURE — 27201089 HC SNARE, DISP (ANY): Mod: PO | Performed by: INTERNAL MEDICINE

## 2020-10-21 PROCEDURE — 45380 PR COLONOSCOPY,BIOPSY: ICD-10-PCS | Mod: 59,,, | Performed by: INTERNAL MEDICINE

## 2020-10-21 PROCEDURE — 88305 TISSUE EXAM BY PATHOLOGIST: CPT | Performed by: PATHOLOGY

## 2020-10-21 PROCEDURE — D9220A PRA ANESTHESIA: ICD-10-PCS | Mod: ,,, | Performed by: ANESTHESIOLOGY

## 2020-10-21 PROCEDURE — 88305 TISSUE EXAM BY PATHOLOGIST: ICD-10-PCS | Mod: 26,,, | Performed by: PATHOLOGY

## 2020-10-21 PROCEDURE — 25000003 PHARM REV CODE 250: Mod: PO | Performed by: NURSE ANESTHETIST, CERTIFIED REGISTERED

## 2020-10-21 PROCEDURE — 63600175 PHARM REV CODE 636 W HCPCS: Mod: PO | Performed by: NURSE ANESTHETIST, CERTIFIED REGISTERED

## 2020-10-21 PROCEDURE — 82962 GLUCOSE BLOOD TEST: CPT | Mod: PO | Performed by: INTERNAL MEDICINE

## 2020-10-21 PROCEDURE — 37000009 HC ANESTHESIA EA ADD 15 MINS: Mod: PO | Performed by: INTERNAL MEDICINE

## 2020-10-21 PROCEDURE — 63600175 PHARM REV CODE 636 W HCPCS: Mod: PO | Performed by: INTERNAL MEDICINE

## 2020-10-21 PROCEDURE — 88342 IMHCHEM/IMCYTCHM 1ST ANTB: CPT | Mod: 59 | Performed by: PATHOLOGY

## 2020-10-21 PROCEDURE — 43248 PR EGD, FLEX, W/DILATION OVER GUIDEWIRE: ICD-10-PCS | Mod: ,,, | Performed by: INTERNAL MEDICINE

## 2020-10-21 PROCEDURE — 87449 NOS EACH ORGANISM AG IA: CPT | Mod: PO | Performed by: INTERNAL MEDICINE

## 2020-10-21 PROCEDURE — 88342 IMHCHEM/IMCYTCHM 1ST ANTB: CPT | Mod: 26,,, | Performed by: PATHOLOGY

## 2020-10-21 PROCEDURE — 43248 EGD GUIDE WIRE INSERTION: CPT | Mod: PO | Performed by: INTERNAL MEDICINE

## 2020-10-21 PROCEDURE — 43239 PR EGD, FLEX, W/BIOPSY, SGL/MULTI: ICD-10-PCS | Mod: 59,,, | Performed by: INTERNAL MEDICINE

## 2020-10-21 PROCEDURE — 27201012 HC FORCEPS, HOT/COLD, DISP: Mod: PO | Performed by: INTERNAL MEDICINE

## 2020-10-21 PROCEDURE — 45385 COLONOSCOPY W/LESION REMOVAL: CPT | Mod: PO | Performed by: INTERNAL MEDICINE

## 2020-10-21 PROCEDURE — 87045 FECES CULTURE AEROBIC BACT: CPT

## 2020-10-21 PROCEDURE — D9220A PRA ANESTHESIA: Mod: ,,, | Performed by: ANESTHESIOLOGY

## 2020-10-21 PROCEDURE — 87046 STOOL CULTR AEROBIC BACT EA: CPT

## 2020-10-21 PROCEDURE — 45380 COLONOSCOPY AND BIOPSY: CPT | Mod: 59,,, | Performed by: INTERNAL MEDICINE

## 2020-10-21 PROCEDURE — 88305 TISSUE EXAM BY PATHOLOGIST: CPT | Mod: 26,,, | Performed by: PATHOLOGY

## 2020-10-21 PROCEDURE — 45380 COLONOSCOPY AND BIOPSY: CPT | Mod: PO | Performed by: INTERNAL MEDICINE

## 2020-10-21 RX ORDER — PROPOFOL 10 MG/ML
VIAL (ML) INTRAVENOUS CONTINUOUS PRN
Status: DISCONTINUED | OUTPATIENT
Start: 2020-10-21 | End: 2020-10-21

## 2020-10-21 RX ORDER — FENTANYL CITRATE 50 UG/ML
INJECTION, SOLUTION INTRAMUSCULAR; INTRAVENOUS
Status: DISCONTINUED | OUTPATIENT
Start: 2020-10-21 | End: 2020-10-21

## 2020-10-21 RX ORDER — LIDOCAINE HCL/PF 100 MG/5ML
SYRINGE (ML) INTRAVENOUS
Status: DISCONTINUED | OUTPATIENT
Start: 2020-10-21 | End: 2020-10-21

## 2020-10-21 RX ORDER — PROPOFOL 10 MG/ML
VIAL (ML) INTRAVENOUS
Status: DISCONTINUED | OUTPATIENT
Start: 2020-10-21 | End: 2020-10-21

## 2020-10-21 RX ORDER — SODIUM CHLORIDE, SODIUM LACTATE, POTASSIUM CHLORIDE, CALCIUM CHLORIDE 600; 310; 30; 20 MG/100ML; MG/100ML; MG/100ML; MG/100ML
INJECTION, SOLUTION INTRAVENOUS CONTINUOUS
Status: DISCONTINUED | OUTPATIENT
Start: 2020-10-21 | End: 2020-10-21 | Stop reason: HOSPADM

## 2020-10-21 RX ADMIN — PROPOFOL 50 MG: 10 INJECTION, EMULSION INTRAVENOUS at 11:10

## 2020-10-21 RX ADMIN — FENTANYL CITRATE 50 MCG: 50 INJECTION, SOLUTION INTRAMUSCULAR; INTRAVENOUS at 11:10

## 2020-10-21 RX ADMIN — SODIUM CHLORIDE, SODIUM LACTATE, POTASSIUM CHLORIDE, AND CALCIUM CHLORIDE: .6; .31; .03; .02 INJECTION, SOLUTION INTRAVENOUS at 11:10

## 2020-10-21 RX ADMIN — PROPOFOL 50.7 MG: 10 INJECTION, EMULSION INTRAVENOUS at 12:10

## 2020-10-21 RX ADMIN — PROPOFOL 50 MG: 10 INJECTION, EMULSION INTRAVENOUS at 12:10

## 2020-10-21 RX ADMIN — PROPOFOL 100 MG: 10 INJECTION, EMULSION INTRAVENOUS at 11:10

## 2020-10-21 RX ADMIN — PROPOFOL 150 MCG/KG/MIN: 10 INJECTION, EMULSION INTRAVENOUS at 12:10

## 2020-10-21 RX ADMIN — LIDOCAINE HYDROCHLORIDE 100 MG: 20 INJECTION, SOLUTION INTRAVENOUS at 11:10

## 2020-10-21 NOTE — BRIEF OP NOTE
Discharge Note  Short Stay      SUMMARY     Admit Date: 10/21/2020    Attending Physician: Earl Colunga Jr., MD     Discharge Physician: Earl Colunga Jr., MD    Discharge Date: 10/21/2020 1:28 PM    Final Diagnosis: Hx of colonic polyps [Z86.010]  Diarrhea, unspecified type [R19.7]  Heartburn [R12]  Dysphagia, unspecified type [R13.10]      Impression:          - Normal oropharynx.                        - Normal upper third of esophagus and middle third                        of esophagus.                        - Reflux esophagitis (minimal).                        - Non-erosive esophageal reflux (NERD) disease                        present.                        - Z-line regular, 38-39 cm from the incisors.                        - Normal cardia.                        - Gastritis. Enlarged gastric folds. Biopsied.                        - Normal stomach otherwise. Biopsied.                        - Normal pylorus.                        - Normal examined duodenum. Biopsied.                        - No endoscopic esophageal abnormality to explain                        patient's dysphagia. Esophagus dilated, 51 Fr.   Recommendation:      - Perform a colonoscopy as previously scheduled.                        - Discharge patient to home after that.                        - Await pathology and CLOtest results.                        - Follow an antireflux regimen.                        - Observe patient's clinical course following                        today's procedure with therapeutic intervention.                        - Continue present medications.                        - Use Nexium (esomeprazole) 40 mg PO daily.                        - Call the G.I. clinic in 2 weeks for reports (if                        you haven't heard from us sooner) [phone].                        - Repeat upper endoscopy PRN for retreatment.                        - Return to GI clinic in 6-8 weeks.                         - If bx c/w PHG, may need CT scan of the liver to                        look HERNANDEZ.     Impression:          - One 3 to 4 mm polyp in the transverse colon,                        removed with a cold snare. Resected and retrieved.                        - One <2 mm polyp in the rectum, removed with a cold                        biopsy forceps. Resected and retrieved.                        - Mild colonic spasm consistent with irritable bowel                        syndrome. Fluid aspiration performed.                        - Non-bleeding internal hemorrhoids.                        - The examination was otherwise normal.                        - The examined portion of the ileum was normal.                        Biopsied.   Recommendation:      - Discharge patient to home.                        - Await pathology and culture results.                        - High fiber diet.                        - Use fiber, for example Citrucel, Fibercon, Konsyl                        or Metamucil.                        - Take a PROBIOTIC, such as ALIGN or CULTURELLE or                        BLACK-Q (all non-prescription), every day for a                        month.                        - Continue present medications.                        - Return to GI clinic in 6-8 weeks.                        - Repeat colonoscopy in 5 years for surveillance.                        -     Earl Colunga MD   10/21/2020       Disposition: HOME OR SELF CARE    Patient Instructions:   Current Discharge Medication List      CONTINUE these medications which have NOT CHANGED    Details   atorvastatin (LIPITOR) 10 MG tablet TAKE 1 TABLET(10 MG) BY MOUTH EVERY NIGHT  Qty: 30 tablet, Refills: 1    Associated Diagnoses: Mixed hyperlipidemia      cetirizine (ZYRTEC) 10 MG tablet TAKE 1 TABLET BY MOUTH DAILY  Qty: 90 tablet, Refills: 1    Associated Diagnoses: Chronic allergic rhinitis      colestipoL (COLESTID) 1 gram Tab Take 1 tablet  (1 g total) by mouth 2 (two) times daily.  Qty: 60 tablet, Refills: 2    Associated Diagnoses: Diarrhea, unspecified type; S/P cholecystectomy      cyanocobalamin 1,000 mcg/mL injection INJECT ONE ML IN THE MUSCLE EVERY 30 DAYS  Qty: 10 mL, Refills: 1    Associated Diagnoses: Anemia due to vitamin B12 deficiency, unspecified B12 deficiency type      diflunisaL (DOLOBID) 500 mg Tab Take 1 tablet (500 mg total) by mouth 2 (two) times daily.  Qty: 180 tablet, Refills: 0    Associated Diagnoses: Fibromyalgia; Lumbosacral radiculopathy      DULoxetine (CYMBALTA) 60 MG capsule TAKE 2 CAPSULES(120 MG) BY MOUTH EVERY EVENING  Qty: 60 capsule, Refills: 1    Associated Diagnoses: Fibromyalgia; Lumbosacral radiculopathy      esomeprazole (NEXIUM) 40 MG capsule Take 1 capsule (40 mg total) by mouth before breakfast.  Qty: 30 capsule, Refills: 3    Associated Diagnoses: Heartburn; Diarrhea, unspecified type      estrogens,conjugated,-methyltestosterone 1.25-2.5mg (ESTRATEST) 1.25-2.5 mg per tablet TAKE 1 TABLET BY MOUTH DAILY  Qty: 30 tablet, Refills: 0      gabapentin (NEURONTIN) 600 MG tablet TAKE 2 TABLETS(1200 MG) BY MOUTH THREE TIMES DAILY  Qty: 540 tablet, Refills: 1      hydrocodone-acetaminophen 10-325mg (NORCO)  mg Tab Take 1 tablet by mouth every 8 (eight) hours as needed.       lisinopriL (PRINIVIL,ZESTRIL) 2.5 MG tablet Take 1 tablet (2.5 mg total) by mouth every evening.  Qty: 90 tablet, Refills: 1    Comments: .  Associated Diagnoses: Essential hypertension; Type 2 diabetes mellitus without complication, without long-term current use of insulin      metoprolol succinate (TOPROL-XL) 50 MG 24 hr tablet TAKE 1 TABLET(50 MG) BY MOUTH EVERY EVENING  Qty: 30 tablet, Refills: 1    Associated Diagnoses: Essential hypertension      montelukast (SINGULAIR) 10 mg tablet TAKE 1 TABLET(10 MG) BY MOUTH EVERY EVENING  Qty: 30 tablet, Refills: 1    Associated Diagnoses: Mild intermittent asthma without complication       morphine 100 mg/100 mL (1 mg/mL) infusion 3.6 mcg/kg/hr continuous. Intrathecal administration per pain pump      ondansetron (ZOFRAN) 8 MG tablet Take 1 tablet (8 mg total) by mouth every 8 (eight) hours.  Qty: 6 tablet, Refills: 1    Associated Diagnoses: Colitis; Irritable bowel syndrome, unspecified type      potassium chloride SA (K-DUR,KLOR-CON) 20 MEQ tablet TAKE 1 TABLET(20 MEQ) BY MOUTH EVERY EVENING  Qty: 30 tablet, Refills: 1      topiramate (TOPAMAX) 100 MG tablet TAKE 4 TABLETS(400 MG) BY MOUTH EVERY EVENING  Qty: 120 tablet, Refills: 1      TRADJENTA 5 mg Tab tablet TAKE 1 TABLET(5 MG) BY MOUTH EVERY EVENING  Qty: 30 tablet, Refills: 1    Associated Diagnoses: Type 2 diabetes mellitus without complication, without long-term current use of insulin      zonisamide (ZONEGRAN) 100 MG Cap Take 4 capsules (400 mg total) by mouth once daily.  Qty: 360 capsule, Refills: 1      !! albuterol (PROVENTIL HFA) 90 mcg/actuation inhaler Inhale 2 puffs into the lungs every 4 (four) hours as needed for Wheezing. Rescue  Qty: 13.4 g, Refills: 1    Associated Diagnoses: Mild intermittent asthma without complication      blood sugar diagnostic (CONTOUR NEXT TEST STRIPS) Strp Check BS four times daily  Qty: 300 strip, Refills: 11    Associated Diagnoses: Type 2 diabetes mellitus without complication, without long-term current use of insulin      blood-glucose meter Misc Use to check blood glucose levels.  Qty: 1 each, Refills: 0      budesonide (PULMICORT) 0.5 mg/2 mL nebulizer solution Take 2 mLs (0.5 mg total) by nebulization 2 (two) times daily as needed.  Qty: 40 mL, Refills: 1    Associated Diagnoses: Mild intermittent asthma without complication      ciclesonide (ALVESCO) 80 mcg/actuation HFAA Inhale 80 mcg into the lungs 2 (two) times daily. Controller      EPINEPHrine (EPIPEN) 0.3 mg/0.3 mL AtIn Inject 0.3 mLs (0.3 mg total) into the muscle once. for 1 dose  Qty: 1 Device, Refills: 0      lancets Misc Use to  check BS twice daily.  Qty: 60 each, Refills: 11    Associated Diagnoses: Type 2 diabetes mellitus without complication, without long-term current use of insulin      levalbuterol (XOPENEX) 1.25 mg/3 mL nebulizer solution Take 3 mLs (1.25 mg total) by nebulization 3 (three) times daily as needed for Wheezing. Rescue  Qty: 1 Box, Refills: 1    Associated Diagnoses: Mild intermittent asthma without complication      sodium chloride 0.9% 0.9 % SolP with bupivacaine 0.5% (5 mg/ml) 0.5 % (5 mg/mL) Soln 0.1 % by Epidural route continuous.      SYMBICORT 160-4.5 mcg/actuation HFAA INHALE 2 PUFFS BY MOUTH INTO THE LUNGS TWICE DAILY( CONTROLLER)  Qty: 30.6 g, Refills: 0      !! VENTOLIN HFA 90 mcg/actuation inhaler USE 2 PUFFS EVERY 4 HOURS AS NEEDED FOR WHEEZING  Qty: 36 g, Refills: 0       !! - Potential duplicate medications found. Please discuss with provider.          Discharge Procedure Orders (must include Diet, Follow-up, Activity)    Follow Up:  Follow up with PCP as per your routine.  Please follow an anti reflux diet and a high fiber diet.  Activity as tolerated.    No driving day of procedure.      PROBIOTICS:  Now that your colon is so cleaned out, now is a good time for a round of PROBIOTICS.  Take a Probiotic product such as Align or Culturelle or Charlotte-Q, every day for a month.                  (The products listed are non-prescription, but you may need to ask the pharmacist for their location.)  Repeat this at least 5-6  times a year.

## 2020-10-21 NOTE — DISCHARGE INSTRUCTIONS
Recovery After Procedural Sedation (Adult)   You have been given medicine by vein to make you sleep during your surgery. This may have included both a pain medicine and sleeping medicine. Most of the effects have worn off. But you may still have some drowsiness for the next 6 to 8 hours.  Home care  Follow these guidelines when you get home:  · For the next 8 hours, you should be watched by a responsible adult. This person should make sure your condition is not getting worse.  · Don't drink any alcohol for the next 24 hours.  · Don't drive, operate dangerous machinery, or make important business or personal decisions during the next 24 hours.  · To prevent injury or falls, use caution when standing and walking for at least 24 hours after your procedure.  Note: Your healthcare provider may tell you not to take any medicine by mouth for pain or sleep in the next 4 hours. These medicines may react with the medicines you were given in the hospital. This could cause a much stronger response than usual.  Follow-up care  Follow up with your healthcare provider if you are not alert and back to your usual level of activity within 12 hours.  When to seek medical advice  Call your healthcare provider right away if any of these occur:  · Drowsiness gets worse  · Weakness or dizziness gets worse  · Repeated vomiting  · You can't be awakened  · Fever  · New rash  [employer] last reviewed this educational content on 9/1/2019  © 1157-1738 [employer]. [address] [place] [address]. All rights reserved. This information is not intended as a substitute for professional medical care. Always follow your healthcare professional's instructions.          PROBIOTICS:  Now that your colon is so cleaned out, now is a good time for a round of PROBIOTICS.  Take a Probiotic product such as Align or Culturelle or Charlotte-Q, every day for a month.                  (The products listed are non-prescription, but you may need  to ask the pharmacist for their location.)  Repeat this at least 5-6  times a year.          Tips to Control Acid Reflux    To control acid reflux, youll need to make some basic diet and lifestyle changes. The simple steps outlined below may be all youll need to ease discomfort.  Watch what you eat  · Avoid fatty foods and spicy foods.  · Eat fewer acidic foods, such as citrus and tomato-based foods. These can increase symptoms.  · Limit drinking alcohol, caffeine, and fizzy beverages. All increase acid reflux.  · Try limiting chocolate, peppermint, and spearmint. These can worsen acid reflux in some people.  Watch when you eat  · Avoid lying down for 3 hours after eating.  · Do not snack before going to bed.  Raise your head  Raising your head and upper body by 4 to 6 inches helps limit reflux when youre lying down. Put blocks under the head of your bed frame to raise it.  Other changes  · Lose weight, if you need to  · Dont exercise near bedtime  · Avoid tight-fitting clothes  · Limit aspirin and ibuprofen  · Stop smoking   Date Last Reviewed: 7/1/2016 © 2000-2017 [employer]. [address] [place] [address]. All rights reserved. This information is not intended as a substitute for professional medical care. Always follow your healthcare professional's instructions.        High-Fiber Diet  Fiber is in fruits, vegetables, cereals, and grains. Fiber passes through your body undigested. A high-fiber diet helps food move through your intestinal tract. The added bulk is helpful in preventing constipation. In people with diverticulosis, fiber helps clean out the pouches along the colon wall. It also prevents new pouches from forming. A high-fiber diet reduces the risk of colon cancer. It also lowers blood cholesterol and prevents high blood sugar in people with diabetes.    The fiber-rich foods listed below should be part of your diet. If you are not used to high-fiber foods, start with 1 or  2 foods from this list. Every 3 to 4 days add a new one to your diet. Do this until you are eating 4 high-fiber foods per day. This should give you 20 to 35 grams of fiber a day. It is also important to drink a lot of water when you are on this diet. You should have 6 to 8 glasses of water a day. Water makes the fiber swell and increases the benefit.  Foods high in dietary fiber  The following foods are high in dietary fiber:  · Breads. Breads made with 100% whole-wheat flour; marielena, wheat, or rye crackers; whole-grain tortillas, bran muffins.  · Cereals. Whole-grain and bran cereals with bran (shredded wheat, wheat flakes, raisin bran, corn bran); oatmeal, rolled oats, granola, and brown rice.  · Fruits. Fresh fruits and their edible skins (pears, prunes, raisins, berries, apples, and apricots); bananas, citrus fruit, mangoes, pineapple; and prune juice.  · Nuts. Any nuts and seeds.  · Vegetables. Best served raw or lightly cooked. All types, especially: green peas, celery, eggplant, potatoes, spinach, broccoli, [place] sprouts, winter squash, carrots, cauliflower, soybeans, lentils, and fresh and dried beans of all kinds.  · Other. Popcorn, any spices.  Date Last Reviewed: 8/1/2016  © 0813-7560 [employer]. [address] [place] [address], [place] [address]. All rights reserved. This information is not intended as a substitute for professional medical care. Always follow your healthcare professional's instructions.

## 2020-10-21 NOTE — ANESTHESIA PREPROCEDURE EVALUATION
10/21/2020  Miri Earl is a 40 y.o., female.    Anesthesia Evaluation    I have reviewed the Patient Summary Reports.    I have reviewed the Nursing Notes.    I have reviewed the Medications.     Review of Systems  Anesthesia Hx:  No problems with previous Anesthesia    Social:  Non-Smoker    Cardiovascular:   Hypertension, well controlled Valvular problems/Murmurs, MVP    Pulmonary:   Asthma asymptomatic and mild Sleep Apnea    Education provided regarding risk of obstructive sleep apnea     Renal/:  Renal/ Normal     Hepatic/GI:   GERD Liver Disease, IBS   Musculoskeletal:   Arthritis     Neurological:   Neuromuscular Disease, Chronic back pain  Peripheral Neuropathy    Endocrine:   Diabetes, well controlled, type 2    Psych:   Psychiatric History anxiety depression          Physical Exam  General:  Well nourished, Morbid Obesity    Airway/Jaw/Neck:  Airway Findings: Mouth Opening: Normal Tongue: Normal  General Airway Assessment: Adult  Oropharynx Findings:  Mallampati: II  Jaw/Neck Findings:  Neck ROM: Normal ROM     Eyes/Ears/Nose:  Eyes/Ears/Nose Findings:    Dental:  Dental Findings:   Chest/Lungs:  Chest/Lungs Findings: Normal Respiratory Rate     Heart/Vascular:  Heart Findings: Rate: Normal  Rhythm: Regular Rhythm        Mental Status:  Mental Status Findings:  Cooperative, Alert and Oriented         Anesthesia Plan  Type of Anesthesia, risks & benefits discussed:  Anesthesia Type:  general  Patient's Preference:   Intra-op Monitoring Plan: standard ASA monitors  Intra-op Monitoring Plan Comments:   Post Op Pain Control Plan: multimodal analgesia  Post Op Pain Control Plan Comments:   Induction:   IV  Beta Blocker:  Patient is on a Beta-Blocker and has received one dose within the past 24 hours (No further documentation required).       Informed Consent: Patient understands risks and agrees  with Anesthesia plan.  Questions answered. Anesthesia consent signed with patient.  ASA Score: 3     Day of Surgery Review of History & Physical:            Ready For Surgery From Anesthesia Perspective.

## 2020-10-21 NOTE — [employer] PATIENT INSTRUCTIONS
Discharge Summary/Instructions for after Colonoscopy with   Biopsy/Polypectomy  April Earl    Wednesday, October 21, 2020  Earl Colunga MD  RESTRICTIONS ON ACTIVITY:  - Do not drive a car or operate machinery until the day after the procedure.      - The following day: return to full activity including work.  - For  3 days: No heavy lifting, straining or running.  - Diet: You can have solid foods, but no gassy foods (i.e. beans, broccoli,   cabbage, etc).  TREATMENT FOR COMMON SIDE EFFECTS:  - Mild abdominal pain and bloating or excessive gas: rest, eat lightly and   use a heating pad.  SYMPTOMS TO WATCH FOR AND REPORT TO YOUR PHYSICIAN:  1. Severe abdominal pain.  2. Fever within 24 hours after a procedure.  3. A large amount of rectal bleeding. (A small amount of blood from the   rectum is not serious, especially if hemorrhoids are present.  3.  Because air was put into your colon during the procedure, expelling   large amounts of air from your rectum is normal.  4.  You may not have a bowel movement for 1-3 days because of the   colonoscopy prep.  This is normal.  5.  Call immediately if you notice any of the following:   Chills and/or fever over 101   Persistent vomiting   Severe abdominal pain, other than gas cramps   Severe chest pain   Black, tarry stools   Any bleeding - exceeding one tablespoon  Your doctor recommends these additional instructions:  We are waiting for your pathology results.   Eat a high fiber diet.   Take a fiber supplement, for example Citrucel, Fibercon, Konsyl or   Metamucil.   Take a PROBIOTIC, such as ALIGN or CULTURELLE or BLACK-Q (all   non-prescription), every day for a month.   And repeat this at least 5-6   times a year.  Continue your present medications (Colestipol).   Return to GI clinic in 6-8 weeks.   Your physician has recommended a repeat colonoscopy in five years for   surveillance.  None  If you have any questions or problems, please call your  physician.  EMERGENCY PHONE NUMBER: [phone]  LAB RESULTS: Call in two (2) weeks for lab results, [phone]  ___________________________________________  Nurse Signature  ___________________________________________  Patient/Designated Responsible Party Signature  Earl Colunga MD  10/21/2020 1:26:56 PM  This report has been verified and signed electronically.  [employer]

## 2020-10-21 NOTE — TRANSFER OF CARE
"Anesthesia Transfer of Care Note    Patient: April D Rajat    Procedure(s) Performed: Procedure(s) (LRB):  EGD (ESOPHAGOGASTRODUODENOSCOPY) (N/A)  COLONOSCOPY (N/A)    Patient location: PACU    Anesthesia Type: general    Transport from OR: Transported from OR on 2-3 L/min O2 by NC with adequate spontaneous ventilation    Post pain: adequate analgesia    Post assessment: no apparent anesthetic complications and tolerated procedure well    Post vital signs: stable    Level of consciousness: awake, alert and oriented    Nausea/Vomiting: no nausea/vomiting    Complications: none    Transfer of care protocol was followed      Last vitals:   Visit Vitals  BP (!) 108/59 (BP Location: Right arm, Patient Position: Lying)   Pulse 84   Temp 36.6 °C (97.9 °F) (Skin)   Resp 17   Ht 5' 8" (1.727 m)   Wt 127 kg (280 lb)   SpO2 96%   Breastfeeding No   BMI 42.57 kg/m²     "

## 2020-10-21 NOTE — [employer] PATIENT INSTRUCTIONS
Discharge Summary/Instructions after an Endoscopic Procedure  Patient Name: Miri Earl  Patient MRN: [id number]  Patient YOB: 1980 Wednesday, October 21, 2020  Earl Colunga MD  RESTRICTIONS:  During your procedure today, you received medications for sedation.  These   medications may affect your judgment, balance and coordination.  Therefore,   for 24 hours, you have the following restrictions:   - DO NOT drive a car, operate machinery, make legal/financial decisions,   sign important papers or drink alcohol.    ACTIVITY:  Today: no heavy lifting, straining or running due to procedural   sedation/anesthesia.  The following day: return to full activity including work.  DIET:  Eat and drink normally unless instructed otherwise.     TREATMENT FOR COMMON SIDE EFFECTS:  - Mild abdominal pain, nausea, belching, bloating or excessive gas:  rest,   eat lightly and use a heating pad.  - Sore Throat: treat with throat lozenges and/or gargle with warm salt   water.  - Because air was used during the procedure, expelling large amounts of air   from your rectum or belching is normal.  - If a bowel prep was taken, you may not have a bowel movement for 1-3 days.    This is normal.  SYMPTOMS TO WATCH FOR AND REPORT TO YOUR PHYSICIAN:  1. Abdominal pain or bloating, other than gas cramps.  2. Chest pain.  3. Back pain.  4. Signs of infection such as: chills or fever occurring within 24 hours   after the procedure.  5. Rectal bleeding, which would show as bright red, maroon, or black stools.   (A tablespoon of blood from the rectum is not serious, especially if   hemorrhoids are present.)  6. Vomiting.  7. Weakness or dizziness.  GO DIRECTLY TO THE NEAREST EMERGENCY ROOM IF YOU HAVE ANY OF THE FOLLOWING:      Difficulty breathing              Chills and/or fever over 101 F   Persistent vomiting and/or vomiting blood   Severe abdominal pain   Severe chest pain   Black, tarry stools   Bleeding- more than one  tablespoon   Any other symptom or condition that you feel may need urgent attention  Your doctor recommends these additional instructions:  If any biopsies were taken, your doctors clinic will contact you in 1 to 2   weeks with any results.  Follow an antireflux regimen.  This includes:       - Do not lie down for at least 3 to 4 hours after meals.        - Raise the head of the bed 4 to 6 inches.        - Decrease excess weight.        - Avoid citrus juices and other acidic foods, alcohol, chocolate, mints,   coffee and other caffeinated beverages, carbonated beverages, fatty and   fried foods.        - Avoid tight-fitting clothing.        - Avoid cigarettes and other tobacco products.   Continue your present medications.   Take Nexium (esomeprazole) 40 mg by mouth once a day.   Return to GI clinic in 6-8 weeks.  For questions, problems or results please call your physician - Earl Colunga MD at Work:  [phone].  EMERGENCY PHONE NUMBER: [phone], LAB RESULTS: [phone]  IF A COMPLICATION OR EMERGENCY SITUATION ARISES AND YOU ARE UNABLE TO REACH   YOUR PHYSICIAN - GO DIRECTLY TO THE EMERGENCY ROOM.  ___________________________________________  Nurse Signature  ___________________________________________  Patient/Designated Responsible Party Signature  Earl Colunga MD  10/21/2020 1:12:24 PM  This report has been verified and signed electronically.  [employer]

## 2020-10-21 NOTE — ANESTHESIA POSTPROCEDURE EVALUATION
Anesthesia Post Evaluation    Patient: April D Earl    Procedure(s) Performed: Procedure(s) (LRB):  EGD (ESOPHAGOGASTRODUODENOSCOPY) (N/A)  COLONOSCOPY (N/A)    Final Anesthesia Type: general    Patient location during evaluation: PACU  Patient participation: Yes- Able to Participate  Level of consciousness: awake and alert and oriented  Post-procedure vital signs: reviewed and stable  Pain management: adequate  Airway patency: patent    PONV status at discharge: No PONV  Anesthetic complications: no      Cardiovascular status: blood pressure returned to baseline and stable  Respiratory status: unassisted and spontaneous ventilation  Hydration status: euvolemic  Follow-up not needed.          Vitals Value Taken Time   /85 10/21/20 1316   Temp 36.2 °C (97.2 °F) 10/21/20 1251   Pulse 80 10/21/20 1316   Resp 18 10/21/20 1316   SpO2 97 % 10/21/20 1316         Event Time   Out of Recovery 13:24:41         Pain/Martha Score: Martha Score: 10 (10/21/2020  1:20 PM)

## 2020-10-22 LAB
C DIFF GDH STL QL: NEGATIVE
C DIFF TOX A+B STL QL IA: NEGATIVE

## 2020-10-23 LAB
E COLI SXT1 STL QL IA: NEGATIVE
E COLI SXT2 STL QL IA: NEGATIVE
O+P STL MICRO: NORMAL

## 2020-10-27 LAB
BACTERIA STL CULT: NORMAL
FINAL PATHOLOGIC DIAGNOSIS: NORMAL
GROSS: NORMAL
Lab: NORMAL
MICROSCOPIC EXAM: NORMAL

## 2020-12-04 DIAGNOSIS — R19.7 DIARRHEA, UNSPECIFIED TYPE: ICD-10-CM

## 2020-12-04 DIAGNOSIS — Z90.49 S/P CHOLECYSTECTOMY: ICD-10-CM

## 2020-12-04 DIAGNOSIS — R12 HEARTBURN: ICD-10-CM

## 2020-12-04 RX ORDER — ESOMEPRAZOLE MAGNESIUM 40 MG/1
40 CAPSULE, DELAYED RELEASE ORAL
Qty: 30 CAPSULE | Refills: 3 | Status: SHIPPED | OUTPATIENT
Start: 2020-12-04 | End: 2021-03-29

## 2020-12-04 RX ORDER — MONTELUKAST SODIUM 4 MG/1
1 TABLET, CHEWABLE ORAL 2 TIMES DAILY
Qty: 60 TABLET | Refills: 2 | Status: SHIPPED | OUTPATIENT
Start: 2020-12-04 | End: 2021-05-17

## 2021-03-26 ENCOUNTER — PATIENT MESSAGE (OUTPATIENT)
Dept: GASTROENTEROLOGY | Facility: CLINIC | Age: 41
End: 2021-03-26

## 2021-03-26 ENCOUNTER — TELEPHONE (OUTPATIENT)
Dept: GASTROENTEROLOGY | Facility: CLINIC | Age: 41
End: 2021-03-26

## 2021-04-06 ENCOUNTER — HOSPITAL ENCOUNTER (OUTPATIENT)
Dept: RADIOLOGY | Facility: HOSPITAL | Age: 41
Discharge: HOME | End: 2021-04-06
Attending: NURSE PRACTITIONER
Payer: COMMERCIAL

## 2021-04-06 ENCOUNTER — TELEPHONE (OUTPATIENT)
Dept: GASTROENTEROLOGY | Facility: CLINIC | Age: 41
End: 2021-04-06

## 2021-04-06 DIAGNOSIS — Z90.49 S/P CHOLECYSTECTOMY: ICD-10-CM

## 2021-04-06 DIAGNOSIS — R79.89 ELEVATED LFTS: ICD-10-CM

## 2021-04-06 PROCEDURE — 76705 ECHO EXAM OF ABDOMEN: CPT | Mod: 26,,, | Performed by: RADIOLOGY

## 2021-04-06 PROCEDURE — 76705 US ABDOMEN LIMITED: ICD-10-PCS | Mod: 26,,, | Performed by: RADIOLOGY

## 2021-04-06 PROCEDURE — 76705 ECHO EXAM OF ABDOMEN: CPT | Mod: TC,PO

## 2021-04-09 ENCOUNTER — TELEPHONE (OUTPATIENT)
Dept: GASTROENTEROLOGY | Facility: CLINIC | Age: 41
End: 2021-04-09

## 2021-04-09 DIAGNOSIS — R79.89 ELEVATED LFTS: Primary | ICD-10-CM

## 2021-04-09 DIAGNOSIS — R16.0 HEPATOMEGALY: ICD-10-CM

## 2021-04-09 DIAGNOSIS — K76.0 HEPATIC STEATOSIS: ICD-10-CM

## 2021-04-12 ENCOUNTER — DOCUMENTATION ONLY (OUTPATIENT)
Dept: TRANSPLANT | Facility: CLINIC | Age: 41
End: 2021-04-12

## 2021-04-13 ENCOUNTER — TELEPHONE (OUTPATIENT)
Dept: HEPATOLOGY | Facility: CLINIC | Age: 41
End: 2021-04-13

## 2021-04-21 ENCOUNTER — TELEPHONE (OUTPATIENT)
Dept: HEPATOLOGY | Facility: CLINIC | Age: 41
End: 2021-04-21

## 2021-04-23 ENCOUNTER — TELEPHONE (OUTPATIENT)
Dept: HEPATOLOGY | Facility: CLINIC | Age: 41
End: 2021-04-23

## 2021-04-29 ENCOUNTER — TELEPHONE (OUTPATIENT)
Dept: HEPATOLOGY | Facility: CLINIC | Age: 41
End: 2021-04-29

## 2021-04-29 ENCOUNTER — OFFICE VISIT (OUTPATIENT)
Dept: HEPATOLOGY | Facility: CLINIC | Age: 41
End: 2021-04-29
Payer: COMMERCIAL

## 2021-04-29 DIAGNOSIS — R16.0 HEPATOMEGALY: ICD-10-CM

## 2021-04-29 DIAGNOSIS — R79.89 ELEVATED LFTS: ICD-10-CM

## 2021-04-29 DIAGNOSIS — K76.0 HEPATIC STEATOSIS: ICD-10-CM

## 2021-04-29 PROCEDURE — 99214 OFFICE O/P EST MOD 30 MIN: CPT | Mod: 95,,, | Performed by: PHYSICIAN ASSISTANT

## 2021-04-29 PROCEDURE — 99214 PR OFFICE/OUTPT VISIT, EST, LEVL IV, 30-39 MIN: ICD-10-PCS | Mod: 95,,, | Performed by: PHYSICIAN ASSISTANT

## 2021-04-30 ENCOUNTER — TELEPHONE (OUTPATIENT)
Dept: HEPATOLOGY | Facility: CLINIC | Age: 41
End: 2021-04-30

## 2021-05-03 ENCOUNTER — TELEPHONE (OUTPATIENT)
Dept: HEPATOLOGY | Facility: CLINIC | Age: 41
End: 2021-05-03

## 2021-05-05 DIAGNOSIS — R12 HEARTBURN: ICD-10-CM

## 2021-05-05 DIAGNOSIS — R19.7 DIARRHEA, UNSPECIFIED TYPE: ICD-10-CM

## 2021-05-06 ENCOUNTER — TELEPHONE (OUTPATIENT)
Dept: HEPATOLOGY | Facility: CLINIC | Age: 41
End: 2021-05-06

## 2021-05-06 ENCOUNTER — PATIENT MESSAGE (OUTPATIENT)
Dept: GASTROENTEROLOGY | Facility: CLINIC | Age: 41
End: 2021-05-06

## 2021-05-06 RX ORDER — ESOMEPRAZOLE MAGNESIUM 40 MG/1
CAPSULE, DELAYED RELEASE ORAL
Qty: 30 CAPSULE | Refills: 3 | OUTPATIENT
Start: 2021-05-06

## 2021-05-07 ENCOUNTER — TELEPHONE (OUTPATIENT)
Dept: HEPATOLOGY | Facility: CLINIC | Age: 41
End: 2021-05-07

## 2021-05-11 ENCOUNTER — TELEPHONE (OUTPATIENT)
Dept: HEPATOLOGY | Facility: CLINIC | Age: 41
End: 2021-05-11

## 2021-05-13 ENCOUNTER — OFFICE VISIT (OUTPATIENT)
Dept: INFECTIOUS DISEASES | Facility: CLINIC | Age: 41
End: 2021-05-13
Payer: COMMERCIAL

## 2021-05-13 ENCOUNTER — LAB VISIT (OUTPATIENT)
Dept: LAB | Facility: HOSPITAL | Age: 41
End: 2021-05-13
Attending: INTERNAL MEDICINE
Payer: COMMERCIAL

## 2021-05-13 VITALS
HEART RATE: 110 BPM | HEIGHT: 68 IN | BODY MASS INDEX: 43.74 KG/M2 | TEMPERATURE: 97 F | DIASTOLIC BLOOD PRESSURE: 99 MMHG | SYSTOLIC BLOOD PRESSURE: 133 MMHG | WEIGHT: 288.63 LBS | OXYGEN SATURATION: 98 %

## 2021-05-13 DIAGNOSIS — R33.9 URINARY RETENTION: ICD-10-CM

## 2021-05-13 DIAGNOSIS — N31.9 NEUROGENIC BLADDER: ICD-10-CM

## 2021-05-13 DIAGNOSIS — R61 DIAPHORESIS: ICD-10-CM

## 2021-05-13 DIAGNOSIS — R89.9 ABNORMAL LABORATORY TEST: Primary | ICD-10-CM

## 2021-05-13 DIAGNOSIS — R68.83 CHILLS (WITHOUT FEVER): ICD-10-CM

## 2021-05-13 DIAGNOSIS — R89.9 ABNORMAL LABORATORY TEST: ICD-10-CM

## 2021-05-13 LAB
BACTERIA #/AREA URNS HPF: NEGATIVE /HPF
BILIRUB UR QL STRIP: NEGATIVE
CLARITY UR: CLEAR
COLOR UR: YELLOW
CRP SERPL-MCNC: 1.72 MG/DL
ERYTHROCYTE [SEDIMENTATION RATE] IN BLOOD BY WESTERGREN METHOD: 42 MM/HR (ref 0–20)
GLUCOSE UR QL STRIP: NEGATIVE
HGB UR QL STRIP: NEGATIVE
HYALINE CASTS #/AREA URNS LPF: 5 /LPF
KETONES UR QL STRIP: NEGATIVE
LEUKOCYTE ESTERASE UR QL STRIP: ABNORMAL
MICROSCOPIC COMMENT: ABNORMAL
NITRITE UR QL STRIP: NEGATIVE
PH UR STRIP: 6 [PH] (ref 5–8)
PROCALCITONIN SERPL IA-MCNC: <0.05 NG/ML (ref 0–0.5)
PROT UR QL STRIP: ABNORMAL
RBC #/AREA URNS HPF: 1 /HPF (ref 0–4)
SP GR UR STRIP: 1.01 (ref 1–1.03)
SQUAMOUS #/AREA URNS HPF: 0 /HPF
URN SPEC COLLECT METH UR: ABNORMAL
UROBILINOGEN UR STRIP-ACNC: NEGATIVE EU/DL
WBC #/AREA URNS HPF: 29 /HPF (ref 0–5)

## 2021-05-13 PROCEDURE — 87186 SC STD MICRODIL/AGAR DIL: CPT | Performed by: INTERNAL MEDICINE

## 2021-05-13 PROCEDURE — 99204 PR OFFICE/OUTPT VISIT, NEW, LEVL IV, 45-59 MIN: ICD-10-PCS | Mod: S$GLB,,, | Performed by: INTERNAL MEDICINE

## 2021-05-13 PROCEDURE — 99204 OFFICE O/P NEW MOD 45 MIN: CPT | Mod: S$GLB,,, | Performed by: INTERNAL MEDICINE

## 2021-05-13 PROCEDURE — 36415 COLL VENOUS BLD VENIPUNCTURE: CPT | Performed by: INTERNAL MEDICINE

## 2021-05-13 PROCEDURE — 87077 CULTURE AEROBIC IDENTIFY: CPT | Performed by: INTERNAL MEDICINE

## 2021-05-13 PROCEDURE — 85651 RBC SED RATE NONAUTOMATED: CPT | Performed by: INTERNAL MEDICINE

## 2021-05-13 PROCEDURE — 86140 C-REACTIVE PROTEIN: CPT | Performed by: INTERNAL MEDICINE

## 2021-05-13 PROCEDURE — 81001 URINALYSIS AUTO W/SCOPE: CPT | Performed by: INTERNAL MEDICINE

## 2021-05-13 PROCEDURE — 84145 PROCALCITONIN (PCT): CPT | Performed by: INTERNAL MEDICINE

## 2021-05-13 PROCEDURE — 87086 URINE CULTURE/COLONY COUNT: CPT | Performed by: INTERNAL MEDICINE

## 2021-05-14 ENCOUNTER — HOSPITAL ENCOUNTER (OUTPATIENT)
Dept: RADIOLOGY | Facility: HOSPITAL | Age: 41
Discharge: HOME | End: 2021-05-14
Attending: PHYSICIAN ASSISTANT
Payer: COMMERCIAL

## 2021-05-14 ENCOUNTER — PATIENT MESSAGE (OUTPATIENT)
Dept: INFECTIOUS DISEASES | Facility: HOSPITAL | Age: 41
End: 2021-05-14

## 2021-05-14 DIAGNOSIS — R16.0 HEPATOMEGALY: ICD-10-CM

## 2021-05-14 DIAGNOSIS — K76.0 HEPATIC STEATOSIS: ICD-10-CM

## 2021-05-14 PROCEDURE — 76391 MR ELASTOGRAPHY: ICD-10-PCS | Mod: 26,,, | Performed by: RADIOLOGY

## 2021-05-14 PROCEDURE — 76391 MR ELASTOGRAPHY: CPT | Mod: TC

## 2021-05-14 PROCEDURE — 76391 MR ELASTOGRAPHY: CPT | Mod: 26,,, | Performed by: RADIOLOGY

## 2021-05-15 ENCOUNTER — PATIENT MESSAGE (OUTPATIENT)
Dept: INFECTIOUS DISEASES | Facility: HOSPITAL | Age: 41
End: 2021-05-15

## 2021-05-15 ENCOUNTER — PATIENT MESSAGE (OUTPATIENT)
Dept: INFECTIOUS DISEASES | Facility: CLINIC | Age: 41
End: 2021-05-15

## 2021-05-15 LAB — BACTERIA UR CULT: ABNORMAL

## 2021-05-15 RX ORDER — CEPHALEXIN 500 MG/1
500 CAPSULE ORAL 4 TIMES DAILY
Qty: 28 CAPSULE | Refills: 0 | Status: SHIPPED | OUTPATIENT
Start: 2021-05-15 | End: 2021-05-22

## 2021-05-17 ENCOUNTER — OFFICE VISIT (OUTPATIENT)
Dept: HEPATOLOGY | Facility: CLINIC | Age: 41
End: 2021-05-17
Payer: COMMERCIAL

## 2021-05-17 DIAGNOSIS — R16.0 HEPATOMEGALY: Primary | ICD-10-CM

## 2021-05-17 DIAGNOSIS — R79.89 ELEVATED LFTS: ICD-10-CM

## 2021-05-17 PROCEDURE — 99214 PR OFFICE/OUTPT VISIT, EST, LEVL IV, 30-39 MIN: ICD-10-PCS | Mod: 95,,, | Performed by: PHYSICIAN ASSISTANT

## 2021-05-17 PROCEDURE — 99214 OFFICE O/P EST MOD 30 MIN: CPT | Mod: 95,,, | Performed by: PHYSICIAN ASSISTANT

## 2021-05-19 ENCOUNTER — TELEPHONE (OUTPATIENT)
Dept: HEPATOLOGY | Facility: CLINIC | Age: 41
End: 2021-05-19

## 2021-05-20 ENCOUNTER — TELEPHONE (OUTPATIENT)
Dept: HEPATOLOGY | Facility: CLINIC | Age: 41
End: 2021-05-20

## 2021-05-25 PROBLEM — M54.16 LUMBAR RADICULOPATHY: Status: ACTIVE | Noted: 2021-05-25

## 2021-05-28 ENCOUNTER — TELEPHONE (OUTPATIENT)
Dept: HEPATOLOGY | Facility: CLINIC | Age: 41
End: 2021-05-28

## 2021-05-28 ENCOUNTER — TELEPHONE (OUTPATIENT)
Dept: INTERVENTIONAL RADIOLOGY/VASCULAR | Facility: CLINIC | Age: 41
End: 2021-05-28

## 2021-06-03 ENCOUNTER — TELEPHONE (OUTPATIENT)
Dept: HEPATOLOGY | Facility: CLINIC | Age: 41
End: 2021-06-03

## 2021-06-08 ENCOUNTER — OFFICE VISIT (OUTPATIENT)
Dept: UROLOGY | Facility: CLINIC | Age: 41
End: 2021-06-08
Payer: COMMERCIAL

## 2021-06-08 VITALS — WEIGHT: 285.06 LBS | BODY MASS INDEX: 43.2 KG/M2 | HEIGHT: 68 IN

## 2021-06-08 DIAGNOSIS — R33.9 INCOMPLETE BLADDER EMPTYING: ICD-10-CM

## 2021-06-08 DIAGNOSIS — R33.9 URINARY RETENTION: ICD-10-CM

## 2021-06-08 DIAGNOSIS — R31.29 MICROHEMATURIA: ICD-10-CM

## 2021-06-08 DIAGNOSIS — N39.0 RECURRENT UTI: Primary | ICD-10-CM

## 2021-06-08 LAB — POC RESIDUAL URINE VOLUME: 2 ML (ref 0–100)

## 2021-06-08 PROCEDURE — 99999 PR PBB SHADOW E&M-EST. PATIENT-LVL III: CPT | Mod: PBBFAC,,, | Performed by: UROLOGY

## 2021-06-08 PROCEDURE — 99204 OFFICE O/P NEW MOD 45 MIN: CPT | Mod: S$GLB,,, | Performed by: UROLOGY

## 2021-06-08 PROCEDURE — 99999 PR PBB SHADOW E&M-EST. PATIENT-LVL III: ICD-10-PCS | Mod: PBBFAC,,, | Performed by: UROLOGY

## 2021-06-08 PROCEDURE — 51798 PR MEAS,POST-VOID RES,US,NON-IMAGING: ICD-10-PCS | Mod: S$GLB,,, | Performed by: UROLOGY

## 2021-06-08 PROCEDURE — 99204 PR OFFICE/OUTPT VISIT, NEW, LEVL IV, 45-59 MIN: ICD-10-PCS | Mod: S$GLB,,, | Performed by: UROLOGY

## 2021-06-08 PROCEDURE — 51798 US URINE CAPACITY MEASURE: CPT | Mod: S$GLB,,, | Performed by: UROLOGY

## 2021-06-08 RX ORDER — HYDROCODONE BITARTRATE AND ACETAMINOPHEN 10; 325 MG/1; MG/1
TABLET ORAL
COMMUNITY
Start: 2021-06-01 | End: 2021-10-05

## 2021-06-08 RX ORDER — CYCLOBENZAPRINE HCL 10 MG
TABLET ORAL
COMMUNITY
End: 2021-06-18 | Stop reason: ALTCHOICE

## 2021-06-08 RX ORDER — DIAZEPAM 5 MG/1
TABLET ORAL
COMMUNITY
Start: 2021-05-07 | End: 2022-06-21

## 2021-06-08 RX ORDER — METHYLPREDNISOLONE 4 MG/1
TABLET ORAL
COMMUNITY
Start: 2021-02-21 | End: 2021-06-22

## 2021-06-08 RX ORDER — EPINEPHRINE 0.3 MG/.3ML
INJECTION SUBCUTANEOUS
COMMUNITY
End: 2023-10-31 | Stop reason: SDUPTHER

## 2021-06-08 RX ORDER — BACLOFEN 10 MG/1
TABLET ORAL
COMMUNITY
Start: 2021-06-02 | End: 2021-12-27

## 2021-06-08 RX ORDER — DULOXETIN HYDROCHLORIDE 30 MG/1
30 CAPSULE, DELAYED RELEASE ORAL DAILY
COMMUNITY
Start: 2021-05-16 | End: 2021-06-18 | Stop reason: ALTCHOICE

## 2021-06-08 RX ORDER — PROMETHAZINE HYDROCHLORIDE 25 MG/1
25 TABLET ORAL EVERY 8 HOURS PRN
COMMUNITY
Start: 2021-05-14

## 2021-06-08 RX ORDER — MUPIROCIN 20 MG/G
OINTMENT TOPICAL
COMMUNITY
End: 2021-06-22

## 2021-06-08 RX ORDER — ESOMEPRAZOLE MAGNESIUM 40 MG/1
40 CAPSULE, DELAYED RELEASE ORAL DAILY
COMMUNITY
Start: 2021-04-07 | End: 2021-06-18 | Stop reason: ALTCHOICE

## 2021-06-09 RX ORDER — FENTANYL CITRATE 50 UG/ML
50 INJECTION, SOLUTION INTRAMUSCULAR; INTRAVENOUS
Status: CANCELLED | OUTPATIENT
Start: 2021-06-09

## 2021-06-09 RX ORDER — MIDAZOLAM HYDROCHLORIDE 1 MG/ML
1 INJECTION INTRAMUSCULAR; INTRAVENOUS
Status: CANCELLED | OUTPATIENT
Start: 2021-06-09

## 2021-06-15 ENCOUNTER — HOSPITAL ENCOUNTER (OUTPATIENT)
Dept: RADIOLOGY | Facility: HOSPITAL | Age: 41
Discharge: HOME | End: 2021-06-15
Attending: UROLOGY
Payer: COMMERCIAL

## 2021-06-15 DIAGNOSIS — N39.0 RECURRENT UTI: ICD-10-CM

## 2021-06-15 PROCEDURE — 76770 US KIDNEY: ICD-10-PCS | Mod: 26,,, | Performed by: RADIOLOGY

## 2021-06-15 PROCEDURE — 76770 US EXAM ABDO BACK WALL COMP: CPT | Mod: TC,PO

## 2021-06-15 PROCEDURE — 76770 US EXAM ABDO BACK WALL COMP: CPT | Mod: 26,,, | Performed by: RADIOLOGY

## 2021-06-16 ENCOUNTER — HOSPITAL ENCOUNTER (OUTPATIENT)
Dept: INTERVENTIONAL RADIOLOGY/VASCULAR | Facility: HOSPITAL | Age: 41
Discharge: HOME | End: 2021-06-16
Attending: PHYSICIAN ASSISTANT
Payer: COMMERCIAL

## 2021-06-16 VITALS
TEMPERATURE: 98 F | WEIGHT: 290 LBS | HEIGHT: 68 IN | SYSTOLIC BLOOD PRESSURE: 128 MMHG | OXYGEN SATURATION: 96 % | HEART RATE: 76 BPM | BODY MASS INDEX: 43.95 KG/M2 | DIASTOLIC BLOOD PRESSURE: 65 MMHG | RESPIRATION RATE: 14 BRPM

## 2021-06-16 DIAGNOSIS — R16.0 HEPATOMEGALY: ICD-10-CM

## 2021-06-16 DIAGNOSIS — R79.89 ELEVATED LFTS: ICD-10-CM

## 2021-06-16 LAB — POCT GLUCOSE: 85 MG/DL (ref 70–110)

## 2021-06-16 PROCEDURE — 25000003 PHARM REV CODE 250: Performed by: STUDENT IN AN ORGANIZED HEALTH CARE EDUCATION/TRAINING PROGRAM

## 2021-06-16 PROCEDURE — 76942 IR BIOPSY LIVER: ICD-10-PCS | Mod: 26,,, | Performed by: RADIOLOGY

## 2021-06-16 PROCEDURE — 27201068 IR BIOPSY LIVER

## 2021-06-16 PROCEDURE — 88313 PR  SPECIAL STAINS,GROUP II: ICD-10-PCS | Mod: 26,,, | Performed by: PATHOLOGY

## 2021-06-16 PROCEDURE — 63600175 PHARM REV CODE 636 W HCPCS: Performed by: RADIOLOGY

## 2021-06-16 PROCEDURE — 88307 TISSUE EXAM BY PATHOLOGIST: CPT | Mod: 26,,, | Performed by: PATHOLOGY

## 2021-06-16 PROCEDURE — 88313 SPECIAL STAINS GROUP 2: CPT | Performed by: PATHOLOGY

## 2021-06-16 PROCEDURE — 76942 ECHO GUIDE FOR BIOPSY: CPT | Mod: TC | Performed by: RADIOLOGY

## 2021-06-16 PROCEDURE — 76942 ECHO GUIDE FOR BIOPSY: CPT | Mod: 26,,, | Performed by: RADIOLOGY

## 2021-06-16 PROCEDURE — 88307 TISSUE EXAM BY PATHOLOGIST: CPT | Performed by: PATHOLOGY

## 2021-06-16 PROCEDURE — 88313 SPECIAL STAINS GROUP 2: CPT | Mod: 26,,, | Performed by: PATHOLOGY

## 2021-06-16 PROCEDURE — 47000 NEEDLE BIOPSY OF LIVER PERQ: CPT | Performed by: RADIOLOGY

## 2021-06-16 PROCEDURE — 88307 PR  SURG PATH,LEVEL V: ICD-10-PCS | Mod: 26,,, | Performed by: PATHOLOGY

## 2021-06-16 PROCEDURE — 47000 IR BIOPSY LIVER: ICD-10-PCS | Mod: ,,, | Performed by: RADIOLOGY

## 2021-06-16 RX ORDER — ONDANSETRON 2 MG/ML
INJECTION INTRAMUSCULAR; INTRAVENOUS CODE/TRAUMA/SEDATION MEDICATION
Status: DISCONTINUED | OUTPATIENT
Start: 2021-06-16 | End: 2021-06-17 | Stop reason: HOSPADM

## 2021-06-16 RX ORDER — OXYCODONE HYDROCHLORIDE 5 MG/1
5 TABLET ORAL ONCE
Status: COMPLETED | OUTPATIENT
Start: 2021-06-16 | End: 2021-06-16

## 2021-06-16 RX ORDER — ONDANSETRON 2 MG/ML
4 INJECTION INTRAMUSCULAR; INTRAVENOUS EVERY 6 HOURS PRN
Status: DISCONTINUED | OUTPATIENT
Start: 2021-06-16 | End: 2021-06-17 | Stop reason: HOSPADM

## 2021-06-16 RX ORDER — SODIUM CHLORIDE 9 MG/ML
INJECTION, SOLUTION INTRAVENOUS ONCE
Status: DISCONTINUED | OUTPATIENT
Start: 2021-06-16 | End: 2021-06-17 | Stop reason: HOSPADM

## 2021-06-16 RX ORDER — MIDAZOLAM HYDROCHLORIDE 1 MG/ML
INJECTION INTRAMUSCULAR; INTRAVENOUS CODE/TRAUMA/SEDATION MEDICATION
Status: DISCONTINUED | OUTPATIENT
Start: 2021-06-16 | End: 2021-06-17 | Stop reason: HOSPADM

## 2021-06-16 RX ORDER — FENTANYL CITRATE 50 UG/ML
INJECTION, SOLUTION INTRAMUSCULAR; INTRAVENOUS CODE/TRAUMA/SEDATION MEDICATION
Status: DISCONTINUED | OUTPATIENT
Start: 2021-06-16 | End: 2021-06-17 | Stop reason: HOSPADM

## 2021-06-16 RX ADMIN — FENTANYL CITRATE 25 MCG: 50 INJECTION, SOLUTION INTRAMUSCULAR; INTRAVENOUS at 10:06

## 2021-06-16 RX ADMIN — FENTANYL CITRATE 50 MCG: 50 INJECTION, SOLUTION INTRAMUSCULAR; INTRAVENOUS at 10:06

## 2021-06-16 RX ADMIN — MIDAZOLAM HYDROCHLORIDE 0.5 MG: 1 INJECTION INTRAMUSCULAR; INTRAVENOUS at 10:06

## 2021-06-16 RX ADMIN — MIDAZOLAM HYDROCHLORIDE 1 MG: 1 INJECTION INTRAMUSCULAR; INTRAVENOUS at 10:06

## 2021-06-16 RX ADMIN — ONDANSETRON 4 MG: 2 INJECTION INTRAMUSCULAR; INTRAVENOUS at 10:06

## 2021-06-16 RX ADMIN — OXYCODONE HYDROCHLORIDE 5 MG: 5 TABLET ORAL at 12:06

## 2021-06-17 LAB
COMMENT: NORMAL
FINAL PATHOLOGIC DIAGNOSIS: NORMAL
GROSS: NORMAL
Lab: NORMAL

## 2021-06-22 ENCOUNTER — OFFICE VISIT (OUTPATIENT)
Dept: HEPATOLOGY | Facility: CLINIC | Age: 41
End: 2021-06-22
Payer: COMMERCIAL

## 2021-06-22 DIAGNOSIS — K74.00 LIVER FIBROSIS: ICD-10-CM

## 2021-06-22 DIAGNOSIS — K75.81 NASH (NONALCOHOLIC STEATOHEPATITIS): Primary | ICD-10-CM

## 2021-06-22 DIAGNOSIS — I10 ESSENTIAL HYPERTENSION: ICD-10-CM

## 2021-06-22 DIAGNOSIS — E11.9 TYPE 2 DIABETES MELLITUS WITHOUT COMPLICATION, WITHOUT LONG-TERM CURRENT USE OF INSULIN: ICD-10-CM

## 2021-06-22 DIAGNOSIS — E78.2 MIXED HYPERLIPIDEMIA: ICD-10-CM

## 2021-06-22 DIAGNOSIS — R16.0 HEPATOMEGALY: ICD-10-CM

## 2021-06-22 DIAGNOSIS — E66.01 CLASS 3 SEVERE OBESITY IN ADULT, UNSPECIFIED BMI, UNSPECIFIED OBESITY TYPE, UNSPECIFIED WHETHER SERIOUS COMORBIDITY PRESENT: ICD-10-CM

## 2021-06-22 PROCEDURE — 99214 PR OFFICE/OUTPT VISIT, EST, LEVL IV, 30-39 MIN: ICD-10-PCS | Mod: 95,,, | Performed by: PHYSICIAN ASSISTANT

## 2021-06-22 PROCEDURE — 99214 OFFICE O/P EST MOD 30 MIN: CPT | Mod: 95,,, | Performed by: PHYSICIAN ASSISTANT

## 2021-06-23 ENCOUNTER — PATIENT MESSAGE (OUTPATIENT)
Dept: HEPATOLOGY | Facility: CLINIC | Age: 41
End: 2021-06-23

## 2021-06-23 ENCOUNTER — TELEPHONE (OUTPATIENT)
Dept: HEPATOLOGY | Facility: CLINIC | Age: 41
End: 2021-06-23

## 2021-06-23 PROBLEM — E66.01 CLASS 3 SEVERE OBESITY IN ADULT: Status: ACTIVE | Noted: 2021-06-23

## 2021-06-23 PROBLEM — K75.81 NASH (NONALCOHOLIC STEATOHEPATITIS): Status: ACTIVE | Noted: 2021-06-23

## 2021-06-23 PROBLEM — K74.00 LIVER FIBROSIS: Status: ACTIVE | Noted: 2021-06-23

## 2021-06-23 PROBLEM — E66.813 CLASS 3 SEVERE OBESITY IN ADULT: Status: ACTIVE | Noted: 2021-06-23

## 2021-06-24 ENCOUNTER — TELEPHONE (OUTPATIENT)
Dept: HEPATOLOGY | Facility: CLINIC | Age: 41
End: 2021-06-24

## 2021-06-28 ENCOUNTER — TELEPHONE (OUTPATIENT)
Dept: HEPATOLOGY | Facility: CLINIC | Age: 41
End: 2021-06-28

## 2021-07-06 ENCOUNTER — PROCEDURE VISIT (OUTPATIENT)
Dept: UROLOGY | Facility: CLINIC | Age: 41
End: 2021-07-06
Payer: COMMERCIAL

## 2021-07-06 VITALS
BODY MASS INDEX: 42.77 KG/M2 | WEIGHT: 282.19 LBS | HEART RATE: 101 BPM | DIASTOLIC BLOOD PRESSURE: 85 MMHG | SYSTOLIC BLOOD PRESSURE: 130 MMHG | HEIGHT: 68 IN

## 2021-07-06 DIAGNOSIS — N39.0 RECURRENT UTI: ICD-10-CM

## 2021-07-06 DIAGNOSIS — R33.9 INCOMPLETE BLADDER EMPTYING: ICD-10-CM

## 2021-07-06 DIAGNOSIS — Z79.2 PROPHYLACTIC ANTIBIOTIC: Primary | ICD-10-CM

## 2021-07-06 DIAGNOSIS — N28.89 OTHER SPECIFIED DISORDERS OF KIDNEY AND URETER: ICD-10-CM

## 2021-07-06 LAB — POC RESIDUAL URINE VOLUME: 1 ML (ref 0–100)

## 2021-07-06 PROCEDURE — 51798 US URINE CAPACITY MEASURE: CPT | Mod: S$GLB,,, | Performed by: UROLOGY

## 2021-07-06 PROCEDURE — 51798 PR MEAS,POST-VOID RES,US,NON-IMAGING: ICD-10-PCS | Mod: S$GLB,,, | Performed by: UROLOGY

## 2021-07-06 PROCEDURE — 52281 CYSTOSCOPY AND TREATMENT: CPT | Mod: S$GLB,,, | Performed by: UROLOGY

## 2021-07-06 PROCEDURE — 52281 PR CYSTOSCOPY,DIL URETHRAL STRICTURE: ICD-10-PCS | Mod: S$GLB,,, | Performed by: UROLOGY

## 2021-07-06 RX ORDER — CIPROFLOXACIN 500 MG/1
500 TABLET ORAL EVERY 12 HOURS
Qty: 30 TABLET | Refills: 0 | Status: SHIPPED | OUTPATIENT
Start: 2021-07-06 | End: 2021-07-21

## 2021-07-06 RX ORDER — ALBUTEROL SULFATE 90 UG/1
2 AEROSOL, METERED RESPIRATORY (INHALATION) EVERY 6 HOURS PRN
COMMUNITY
Start: 2021-06-22 | End: 2021-07-06 | Stop reason: DRUGHIGH

## 2021-07-06 RX ORDER — ALBUTEROL SULFATE 90 UG/1
2 POWDER, METERED RESPIRATORY (INHALATION) EVERY 6 HOURS PRN
COMMUNITY
Start: 2021-07-01 | End: 2022-03-02

## 2021-07-09 ENCOUNTER — PATIENT MESSAGE (OUTPATIENT)
Dept: UROLOGY | Facility: CLINIC | Age: 41
End: 2021-07-09

## 2021-07-09 DIAGNOSIS — N39.0 RECURRENT UTI: ICD-10-CM

## 2021-07-09 DIAGNOSIS — Z79.2 PROPHYLACTIC ANTIBIOTIC: Primary | ICD-10-CM

## 2021-07-09 RX ORDER — DIPHENHYDRAMINE HCL 50 MG
50 CAPSULE ORAL
Qty: 1 CAPSULE | Refills: 0 | Status: SHIPPED | OUTPATIENT
Start: 2021-07-09 | End: 2021-07-28

## 2021-07-09 RX ORDER — PREDNISONE 50 MG/1
50 TABLET ORAL
Qty: 3 TABLET | Refills: 0 | Status: SHIPPED | OUTPATIENT
Start: 2021-07-09 | End: 2021-10-05

## 2021-07-10 ENCOUNTER — LAB VISIT (OUTPATIENT)
Dept: LAB | Facility: HOSPITAL | Age: 41
End: 2021-07-10
Attending: UROLOGY
Payer: COMMERCIAL

## 2021-07-10 DIAGNOSIS — N39.0 RECURRENT UTI: ICD-10-CM

## 2021-07-10 LAB
CREAT SERPL-MCNC: 0.8 MG/DL (ref 0.5–1.4)
EST. GFR  (AFRICAN AMERICAN): >60 ML/MIN/1.73 M^2
EST. GFR  (NON AFRICAN AMERICAN): >60 ML/MIN/1.73 M^2

## 2021-07-10 PROCEDURE — 82565 ASSAY OF CREATININE: CPT | Mod: PO | Performed by: UROLOGY

## 2021-07-10 PROCEDURE — 36415 COLL VENOUS BLD VENIPUNCTURE: CPT | Mod: PO | Performed by: UROLOGY

## 2021-07-12 ENCOUNTER — HOSPITAL ENCOUNTER (OUTPATIENT)
Dept: RADIOLOGY | Facility: HOSPITAL | Age: 41
Discharge: HOME | End: 2021-07-12
Attending: UROLOGY
Payer: COMMERCIAL

## 2021-07-12 DIAGNOSIS — N28.89 OTHER SPECIFIED DISORDERS OF KIDNEY AND URETER: ICD-10-CM

## 2021-07-12 PROCEDURE — 74170 CT ABD WO CNTRST FLWD CNTRST: CPT | Mod: 26,,, | Performed by: RADIOLOGY

## 2021-07-12 PROCEDURE — 74170 CT ABD WO CNTRST FLWD CNTRST: CPT | Mod: TC,PO

## 2021-07-12 PROCEDURE — 25500020 PHARM REV CODE 255: Mod: PO | Performed by: UROLOGY

## 2021-07-12 PROCEDURE — 74170 CT ABDOMEN W WO CONTRAST: ICD-10-PCS | Mod: 26,,, | Performed by: RADIOLOGY

## 2021-07-12 RX ADMIN — IOHEXOL 100 ML: 350 INJECTION, SOLUTION INTRAVENOUS at 12:07

## 2021-07-13 ENCOUNTER — DOCUMENTATION ONLY (OUTPATIENT)
Dept: BARIATRICS | Facility: CLINIC | Age: 41
End: 2021-07-13

## 2021-07-14 ENCOUNTER — TELEPHONE (OUTPATIENT)
Dept: HEPATOLOGY | Facility: CLINIC | Age: 41
End: 2021-07-14

## 2021-07-21 ENCOUNTER — PATIENT MESSAGE (OUTPATIENT)
Dept: HEPATOLOGY | Facility: CLINIC | Age: 41
End: 2021-07-21

## 2021-07-22 ENCOUNTER — OFFICE VISIT (OUTPATIENT)
Dept: ENDOCRINOLOGY | Facility: CLINIC | Age: 41
End: 2021-07-22
Payer: COMMERCIAL

## 2021-07-22 VITALS
WEIGHT: 283.06 LBS | SYSTOLIC BLOOD PRESSURE: 120 MMHG | TEMPERATURE: 99 F | HEART RATE: 108 BPM | HEIGHT: 68 IN | BODY MASS INDEX: 42.9 KG/M2 | OXYGEN SATURATION: 97 % | DIASTOLIC BLOOD PRESSURE: 84 MMHG

## 2021-07-22 DIAGNOSIS — I10 ESSENTIAL HYPERTENSION: ICD-10-CM

## 2021-07-22 DIAGNOSIS — K75.81 NASH (NONALCOHOLIC STEATOHEPATITIS): ICD-10-CM

## 2021-07-22 DIAGNOSIS — E78.5 HYPERLIPIDEMIA, UNSPECIFIED HYPERLIPIDEMIA TYPE: ICD-10-CM

## 2021-07-22 DIAGNOSIS — E11.40 TYPE 2 DIABETES MELLITUS WITH DIABETIC NEUROPATHY, WITHOUT LONG-TERM CURRENT USE OF INSULIN: ICD-10-CM

## 2021-07-22 DIAGNOSIS — E04.1 NODULAR THYROID DISEASE: Primary | ICD-10-CM

## 2021-07-22 PROCEDURE — 99999 PR PBB SHADOW E&M-EST. PATIENT-LVL IV: ICD-10-PCS | Mod: PBBFAC,,, | Performed by: PHYSICIAN ASSISTANT

## 2021-07-22 PROCEDURE — 99214 PR OFFICE/OUTPT VISIT, EST, LEVL IV, 30-39 MIN: ICD-10-PCS | Mod: S$GLB,,, | Performed by: PHYSICIAN ASSISTANT

## 2021-07-22 PROCEDURE — 99214 OFFICE O/P EST MOD 30 MIN: CPT | Mod: S$GLB,,, | Performed by: PHYSICIAN ASSISTANT

## 2021-07-22 PROCEDURE — 99999 PR PBB SHADOW E&M-EST. PATIENT-LVL IV: CPT | Mod: PBBFAC,,, | Performed by: PHYSICIAN ASSISTANT

## 2021-07-22 RX ORDER — SEMAGLUTIDE 1.34 MG/ML
INJECTION, SOLUTION SUBCUTANEOUS
Qty: 1 PEN | Refills: 5 | Status: SHIPPED | OUTPATIENT
Start: 2021-07-22 | End: 2021-10-05

## 2021-07-23 ENCOUNTER — PATIENT MESSAGE (OUTPATIENT)
Dept: HEPATOLOGY | Facility: CLINIC | Age: 41
End: 2021-07-23

## 2021-07-23 ENCOUNTER — TELEPHONE (OUTPATIENT)
Dept: HEPATOLOGY | Facility: CLINIC | Age: 41
End: 2021-07-23

## 2021-10-07 ENCOUNTER — TELEPHONE (OUTPATIENT)
Dept: FAMILY MEDICINE | Facility: CLINIC | Age: 41
End: 2021-10-07

## 2021-10-08 ENCOUNTER — PATIENT MESSAGE (OUTPATIENT)
Dept: FAMILY MEDICINE | Facility: CLINIC | Age: 41
End: 2021-10-08

## 2021-10-11 ENCOUNTER — TELEPHONE (OUTPATIENT)
Dept: FAMILY MEDICINE | Facility: CLINIC | Age: 41
End: 2021-10-11

## 2021-12-22 ENCOUNTER — PATIENT MESSAGE (OUTPATIENT)
Dept: HEPATOLOGY | Facility: CLINIC | Age: 41
End: 2021-12-22
Payer: COMMERCIAL

## 2021-12-22 DIAGNOSIS — K74.00 LIVER FIBROSIS: Primary | ICD-10-CM

## 2021-12-22 DIAGNOSIS — R79.89 ELEVATED LFTS: ICD-10-CM

## 2021-12-22 DIAGNOSIS — K75.81 NASH (NONALCOHOLIC STEATOHEPATITIS): ICD-10-CM

## 2021-12-27 ENCOUNTER — OFFICE VISIT (OUTPATIENT)
Dept: HEPATOLOGY | Facility: CLINIC | Age: 41
End: 2021-12-27
Payer: COMMERCIAL

## 2021-12-27 ENCOUNTER — TELEPHONE (OUTPATIENT)
Dept: HEPATOLOGY | Facility: CLINIC | Age: 41
End: 2021-12-27

## 2021-12-27 DIAGNOSIS — E11.9 TYPE 2 DIABETES MELLITUS WITHOUT COMPLICATION, WITHOUT LONG-TERM CURRENT USE OF INSULIN: ICD-10-CM

## 2021-12-27 DIAGNOSIS — K74.00 LIVER FIBROSIS: ICD-10-CM

## 2021-12-27 DIAGNOSIS — R16.0 HEPATOMEGALY: ICD-10-CM

## 2021-12-27 DIAGNOSIS — E66.01 CLASS 3 SEVERE OBESITY IN ADULT, UNSPECIFIED BMI, UNSPECIFIED OBESITY TYPE, UNSPECIFIED WHETHER SERIOUS COMORBIDITY PRESENT: ICD-10-CM

## 2021-12-27 DIAGNOSIS — E78.2 MIXED HYPERLIPIDEMIA: ICD-10-CM

## 2021-12-27 DIAGNOSIS — K75.81 NASH (NONALCOHOLIC STEATOHEPATITIS): Primary | ICD-10-CM

## 2021-12-27 DIAGNOSIS — I10 PRIMARY HYPERTENSION: ICD-10-CM

## 2021-12-27 DIAGNOSIS — R79.89 ELEVATED LFTS: ICD-10-CM

## 2021-12-27 PROCEDURE — 99214 PR OFFICE/OUTPT VISIT, EST, LEVL IV, 30-39 MIN: ICD-10-PCS | Mod: 95,,, | Performed by: PHYSICIAN ASSISTANT

## 2021-12-27 PROCEDURE — 4010F ACE/ARB THERAPY RXD/TAKEN: CPT | Mod: CPTII,95,, | Performed by: PHYSICIAN ASSISTANT

## 2021-12-27 PROCEDURE — 1160F PR REVIEW ALL MEDS BY PRESCRIBER/CLIN PHARMACIST DOCUMENTED: ICD-10-PCS | Mod: CPTII,95,, | Performed by: PHYSICIAN ASSISTANT

## 2021-12-27 PROCEDURE — 3066F NEPHROPATHY DOC TX: CPT | Mod: CPTII,95,, | Performed by: PHYSICIAN ASSISTANT

## 2021-12-27 PROCEDURE — 1160F RVW MEDS BY RX/DR IN RCRD: CPT | Mod: CPTII,95,, | Performed by: PHYSICIAN ASSISTANT

## 2021-12-27 PROCEDURE — 3066F PR DOCUMENTATION OF TREATMENT FOR NEPHROPATHY: ICD-10-PCS | Mod: CPTII,95,, | Performed by: PHYSICIAN ASSISTANT

## 2021-12-27 PROCEDURE — 4010F PR ACE/ARB THEARPY RXD/TAKEN: ICD-10-PCS | Mod: CPTII,95,, | Performed by: PHYSICIAN ASSISTANT

## 2021-12-27 PROCEDURE — 3044F HG A1C LEVEL LT 7.0%: CPT | Mod: CPTII,95,, | Performed by: PHYSICIAN ASSISTANT

## 2021-12-27 PROCEDURE — 1159F MED LIST DOCD IN RCRD: CPT | Mod: CPTII,95,, | Performed by: PHYSICIAN ASSISTANT

## 2021-12-27 PROCEDURE — 1159F PR MEDICATION LIST DOCUMENTED IN MEDICAL RECORD: ICD-10-PCS | Mod: CPTII,95,, | Performed by: PHYSICIAN ASSISTANT

## 2021-12-27 PROCEDURE — 99214 OFFICE O/P EST MOD 30 MIN: CPT | Mod: 95,,, | Performed by: PHYSICIAN ASSISTANT

## 2021-12-27 PROCEDURE — 3060F POS MICROALBUMINURIA REV: CPT | Mod: CPTII,95,, | Performed by: PHYSICIAN ASSISTANT

## 2021-12-27 PROCEDURE — 3044F PR MOST RECENT HEMOGLOBIN A1C LEVEL <7.0%: ICD-10-PCS | Mod: CPTII,95,, | Performed by: PHYSICIAN ASSISTANT

## 2021-12-27 PROCEDURE — 3060F PR POS MICROALBUMINURIA RESULT DOCUMENTED/REVIEW: ICD-10-PCS | Mod: CPTII,95,, | Performed by: PHYSICIAN ASSISTANT

## 2022-03-02 ENCOUNTER — TELEPHONE (OUTPATIENT)
Dept: GASTROENTEROLOGY | Facility: CLINIC | Age: 42
End: 2022-03-02
Payer: COMMERCIAL

## 2022-03-02 ENCOUNTER — OFFICE VISIT (OUTPATIENT)
Dept: ENDOCRINOLOGY | Facility: CLINIC | Age: 42
End: 2022-03-02
Payer: COMMERCIAL

## 2022-03-02 ENCOUNTER — OFFICE VISIT (OUTPATIENT)
Dept: GASTROENTEROLOGY | Facility: CLINIC | Age: 42
End: 2022-03-02
Payer: COMMERCIAL

## 2022-03-02 ENCOUNTER — LAB VISIT (OUTPATIENT)
Dept: LAB | Facility: HOSPITAL | Age: 42
End: 2022-03-02
Attending: INTERNAL MEDICINE
Payer: COMMERCIAL

## 2022-03-02 VITALS
SYSTOLIC BLOOD PRESSURE: 122 MMHG | BODY MASS INDEX: 39.4 KG/M2 | DIASTOLIC BLOOD PRESSURE: 82 MMHG | WEIGHT: 260 LBS | OXYGEN SATURATION: 97 % | HEIGHT: 68 IN | HEART RATE: 108 BPM

## 2022-03-02 VITALS — HEIGHT: 68 IN | BODY MASS INDEX: 39.4 KG/M2 | WEIGHT: 260 LBS

## 2022-03-02 DIAGNOSIS — T40.2X5A CONSTIPATION DUE TO OPIOID THERAPY: ICD-10-CM

## 2022-03-02 DIAGNOSIS — K59.03 CONSTIPATION DUE TO OPIOID THERAPY: ICD-10-CM

## 2022-03-02 DIAGNOSIS — K92.1 HEMATOCHEZIA: ICD-10-CM

## 2022-03-02 DIAGNOSIS — E04.2 MULTINODULAR GOITER: Primary | ICD-10-CM

## 2022-03-02 DIAGNOSIS — R94.6 ABNORMAL THYROID FUNCTION TEST: ICD-10-CM

## 2022-03-02 DIAGNOSIS — R79.89 ELEVATED LFTS: ICD-10-CM

## 2022-03-02 DIAGNOSIS — Z87.19 HISTORY OF HEMORRHOIDS: ICD-10-CM

## 2022-03-02 DIAGNOSIS — R12 HEARTBURN: Primary | ICD-10-CM

## 2022-03-02 DIAGNOSIS — K62.89 RECTAL PAIN: ICD-10-CM

## 2022-03-02 DIAGNOSIS — E04.2 MULTINODULAR GOITER: ICD-10-CM

## 2022-03-02 DIAGNOSIS — K76.0 HEPATIC STEATOSIS: ICD-10-CM

## 2022-03-02 DIAGNOSIS — R11.0 NAUSEA: ICD-10-CM

## 2022-03-02 DIAGNOSIS — Z01.818 PRE-OP TESTING: Primary | ICD-10-CM

## 2022-03-02 DIAGNOSIS — Z90.49 S/P CHOLECYSTECTOMY: ICD-10-CM

## 2022-03-02 LAB
ERYTHROCYTE [DISTWIDTH] IN BLOOD BY AUTOMATED COUNT: 13.1 % (ref 11.5–14.5)
HCT VFR BLD AUTO: 47.6 % (ref 37–48.5)
HGB BLD-MCNC: 15.3 G/DL (ref 12–16)
MCH RBC QN AUTO: 30.2 PG (ref 27–31)
MCHC RBC AUTO-ENTMCNC: 32.1 G/DL (ref 32–36)
MCV RBC AUTO: 94 FL (ref 82–98)
PLATELET # BLD AUTO: 342 K/UL (ref 150–450)
PMV BLD AUTO: 10.6 FL (ref 9.2–12.9)
RBC # BLD AUTO: 5.07 M/UL (ref 4–5.4)
T3 SERPL-MCNC: 141 NG/DL (ref 60–180)
T4 FREE SERPL-MCNC: 0.96 NG/DL (ref 0.71–1.51)
TSH SERPL DL<=0.005 MIU/L-ACNC: 0.36 UIU/ML (ref 0.4–4)
WBC # BLD AUTO: 10.6 K/UL (ref 3.9–12.7)

## 2022-03-02 PROCEDURE — 3079F DIAST BP 80-89 MM HG: CPT | Mod: CPTII,S$GLB,, | Performed by: INTERNAL MEDICINE

## 2022-03-02 PROCEDURE — 85027 COMPLETE CBC AUTOMATED: CPT | Performed by: NURSE PRACTITIONER

## 2022-03-02 PROCEDURE — 99214 PR OFFICE/OUTPT VISIT, EST, LEVL IV, 30-39 MIN: ICD-10-PCS | Mod: S$GLB,,, | Performed by: INTERNAL MEDICINE

## 2022-03-02 PROCEDURE — 84480 ASSAY TRIIODOTHYRONINE (T3): CPT | Performed by: INTERNAL MEDICINE

## 2022-03-02 PROCEDURE — 36415 COLL VENOUS BLD VENIPUNCTURE: CPT | Mod: PO | Performed by: INTERNAL MEDICINE

## 2022-03-02 PROCEDURE — 1159F MED LIST DOCD IN RCRD: CPT | Mod: CPTII,S$GLB,, | Performed by: INTERNAL MEDICINE

## 2022-03-02 PROCEDURE — 99999 PR PBB SHADOW E&M-EST. PATIENT-LVL IV: ICD-10-PCS | Mod: PBBFAC,,, | Performed by: INTERNAL MEDICINE

## 2022-03-02 PROCEDURE — 3079F PR MOST RECENT DIASTOLIC BLOOD PRESSURE 80-89 MM HG: ICD-10-PCS | Mod: CPTII,S$GLB,, | Performed by: INTERNAL MEDICINE

## 2022-03-02 PROCEDURE — 3008F PR BODY MASS INDEX (BMI) DOCUMENTED: ICD-10-PCS | Mod: CPTII,S$GLB,, | Performed by: INTERNAL MEDICINE

## 2022-03-02 PROCEDURE — 3008F BODY MASS INDEX DOCD: CPT | Mod: CPTII,S$GLB,, | Performed by: INTERNAL MEDICINE

## 2022-03-02 PROCEDURE — 84439 ASSAY OF FREE THYROXINE: CPT | Performed by: INTERNAL MEDICINE

## 2022-03-02 PROCEDURE — 1160F RVW MEDS BY RX/DR IN RCRD: CPT | Mod: CPTII,S$GLB,, | Performed by: INTERNAL MEDICINE

## 2022-03-02 PROCEDURE — 99214 OFFICE O/P EST MOD 30 MIN: CPT | Mod: 95,,, | Performed by: NURSE PRACTITIONER

## 2022-03-02 PROCEDURE — 3008F PR BODY MASS INDEX (BMI) DOCUMENTED: ICD-10-PCS | Mod: CPTII,95,, | Performed by: NURSE PRACTITIONER

## 2022-03-02 PROCEDURE — 84443 ASSAY THYROID STIM HORMONE: CPT | Performed by: INTERNAL MEDICINE

## 2022-03-02 PROCEDURE — 1160F PR REVIEW ALL MEDS BY PRESCRIBER/CLIN PHARMACIST DOCUMENTED: ICD-10-PCS | Mod: CPTII,95,, | Performed by: NURSE PRACTITIONER

## 2022-03-02 PROCEDURE — 1160F RVW MEDS BY RX/DR IN RCRD: CPT | Mod: CPTII,95,, | Performed by: NURSE PRACTITIONER

## 2022-03-02 PROCEDURE — 99999 PR PBB SHADOW E&M-EST. PATIENT-LVL IV: CPT | Mod: PBBFAC,,, | Performed by: INTERNAL MEDICINE

## 2022-03-02 PROCEDURE — 99214 PR OFFICE/OUTPT VISIT, EST, LEVL IV, 30-39 MIN: ICD-10-PCS | Mod: 95,,, | Performed by: NURSE PRACTITIONER

## 2022-03-02 PROCEDURE — 1159F PR MEDICATION LIST DOCUMENTED IN MEDICAL RECORD: ICD-10-PCS | Mod: CPTII,S$GLB,, | Performed by: INTERNAL MEDICINE

## 2022-03-02 PROCEDURE — 1159F MED LIST DOCD IN RCRD: CPT | Mod: CPTII,95,, | Performed by: NURSE PRACTITIONER

## 2022-03-02 PROCEDURE — 1159F PR MEDICATION LIST DOCUMENTED IN MEDICAL RECORD: ICD-10-PCS | Mod: CPTII,95,, | Performed by: NURSE PRACTITIONER

## 2022-03-02 PROCEDURE — 3074F SYST BP LT 130 MM HG: CPT | Mod: CPTII,S$GLB,, | Performed by: INTERNAL MEDICINE

## 2022-03-02 PROCEDURE — 4010F PR ACE/ARB THEARPY RXD/TAKEN: ICD-10-PCS | Mod: CPTII,S$GLB,, | Performed by: INTERNAL MEDICINE

## 2022-03-02 PROCEDURE — 4010F ACE/ARB THERAPY RXD/TAKEN: CPT | Mod: CPTII,S$GLB,, | Performed by: INTERNAL MEDICINE

## 2022-03-02 PROCEDURE — 99214 OFFICE O/P EST MOD 30 MIN: CPT | Mod: S$GLB,,, | Performed by: INTERNAL MEDICINE

## 2022-03-02 PROCEDURE — 1160F PR REVIEW ALL MEDS BY PRESCRIBER/CLIN PHARMACIST DOCUMENTED: ICD-10-PCS | Mod: CPTII,S$GLB,, | Performed by: INTERNAL MEDICINE

## 2022-03-02 PROCEDURE — 3074F PR MOST RECENT SYSTOLIC BLOOD PRESSURE < 130 MM HG: ICD-10-PCS | Mod: CPTII,S$GLB,, | Performed by: INTERNAL MEDICINE

## 2022-03-02 PROCEDURE — 3008F BODY MASS INDEX DOCD: CPT | Mod: CPTII,95,, | Performed by: NURSE PRACTITIONER

## 2022-03-02 RX ORDER — NALDEMEDINE 0.2 MG/1
0.2 TABLET ORAL DAILY
Qty: 30 TABLET | Refills: 3 | Status: SHIPPED | OUTPATIENT
Start: 2022-03-02 | End: 2022-06-21

## 2022-03-02 RX ORDER — DEXLANSOPRAZOLE 60 MG/1
60 CAPSULE, DELAYED RELEASE ORAL
Qty: 30 CAPSULE | Refills: 11 | Status: SHIPPED | OUTPATIENT
Start: 2022-03-02 | End: 2023-02-17 | Stop reason: SDUPTHER

## 2022-03-02 RX ORDER — OXYCODONE HYDROCHLORIDE 10 MG/1
TABLET ORAL
COMMUNITY
Start: 2022-01-26

## 2022-03-02 RX ORDER — ALBUTEROL SULFATE 90 UG/1
AEROSOL, METERED RESPIRATORY (INHALATION)
COMMUNITY
Start: 2022-01-16 | End: 2022-05-19 | Stop reason: SDUPTHER

## 2022-03-02 RX ORDER — KETOROLAC TROMETHAMINE 10 MG/1
TABLET, FILM COATED ORAL DAILY PRN
COMMUNITY
Start: 2022-01-26 | End: 2023-03-23

## 2022-03-02 RX ORDER — FAMOTIDINE 10 MG/1
10 TABLET ORAL 2 TIMES DAILY
Status: ON HOLD | COMMUNITY
End: 2022-05-04 | Stop reason: HOSPADM

## 2022-03-02 NOTE — PROGRESS NOTES
CHIEF COMPLAINT: Multinodular Goiter   41 y.o. old being seen as a new patient to me. Saw PA in [place]. In 2019 was found to have thyroid nodule. No XRT to head/neck. No History of FNA. Has occasional dysphagia but not all the time. She does get some occasional palpitations. On a beta blocker. She has alternating diarrhea and constipation with IBS. She does tend to feel hot. Has some insomnia. States unrelated to back pain. She does use her CPAP.       PAST MEDICAL HISTORY/PAST SURGICAL HISTORY:  Reviewed in [facility]    SOCIAL HISTORY: Reviewed in [facility]    FAMILY HISTORY:  Mother and daughters with Hypothyroidism.     MEDICATIONS/ALLERGIES: The patient's MedCard has been updated and reviewed.      ROS:   Constitutional: Weight stable   Cardiovascular: No CP   Respiratory: No SOB  Remainder ROS negative        PE:    GENERAL: Well developed, well nourished.  NECK: Supple, trachea midline, thyroid irregular but no palpable nodules.   CHEST: Resp even and unlabored, CTA bilateral.  CARDIAC: RRR, S1, S2 heard, no murmurs, rubs, S3, or S4  SKIN: no acanthosis nigracans.      LABS/Radiology     Latest Reference Range & Units 11/13/21 07:57   TSH 0.400 - 4.000 uIU/mL 0.236 (L) [1]   Free T4 0.78 - 2.19 ng/dL 1.16 [2]   US SOFT TISSUE HEAD NECK THYROID     CLINICAL HISTORY:  Follow-up thyroid nodules.     COMPARISON:  06/19/2021     FINDINGS:  Right thyroid lobe measures 5.5 x 1.7 x 2.1 cm.  Left thyroid lobe measures 4.8 x 1.6 x 2.0 cm.  Isthmus measures 0.5 cm AP thickness.  Nodule mid right thyroid lobe measures 10 x 8 x 10 mm previously 12 x 8 x 10 mm unchanged given differences in measuring technique.  Lower pole nodule on the right measures 9 x 6 x 8 mm previously 8 x 6 x 8 mm unchanged given differences in measuring technique.  Nodule mid left thyroid lobe measures 8 x 6 x 10 mm previously 8 x 5 x 8 mm unchanged given differences in measuring technique.  A 5 x 3 x 3 mm hypoechoic nodule within the lower pole left  "thyroid lobe was not identified on previous exam.     Impression:     1. Bilateral thyroid nodules with new subcentimeter lower pole left thyroid nodule present.  Other nodules are stable.  TI-RADS 4. Recommend 12 month ultrasound follow-up.    NM THYROID UPTAKE AND SCAN     CLINICAL HISTORY:  Abnormal results of thyroid function studies thyroid nodule     TECHNIQUE:  Following the oral ingestion of 296 micro Ci of I-123 sodium iodine, counts over the neck at 24 hours were acquired and compared to counts over a standard neck phantom.     Gamma camera images of the thyroid gland were then obtained in multiple projections 6 hours and 24 hours following oral ingestion of iodine 123.     COMPARISON:  Ultrasound of the thyroid gland 09/25/2021     FINDINGS:  The 6 hour uptake is normal at 9.7% (normal range, 5-15%).     The 24 hour uptake is normal at 25.3 % (normal 10-30%).     The left lobe of the thyroid is normal in size, shape, and location with homogenous distribution of the radionuclide throughout the lobe.     The right lobe of the thyroid gland has a similar appearance, with homogeneous distribution of the radionuclide throughout the gland.  There is no "cold defect" or scintigraphic abnormality.     Impression:     1. Normal 24 hour radioiodine uptake by the thyroid.  2. Normal scintigraphic appearance of the thyroid.     ASSESSMENT/PLAN:  1. Multinodular Goiter- with a suppressed TSH. Has had an uptake and scan without distinct hot or cold nodule. Nodules do not meet criteria for FNA. Will repeat TFT. If still significantly suppressed will need Tx. Could also consider low dose tapazole to see if helps with symptoms. Could consider getting TRAB as it has not been done. But TSH mildly suppressed or normal, can hold off due to lab tube shortage.     2. Multinodular Goiter- see # 1/       FOLLOWUP  Needs TSH, Ft4, T3  F/U Sept with TSH, thyroid US    "

## 2022-03-02 NOTE — TELEPHONE ENCOUNTER
Returned call to the patient, EGD scheduled with the patient, patient verbalized understanding of this, my chart message sent to the patient.

## 2022-03-02 NOTE — PATIENT INSTRUCTIONS
Constipation (Adult)  Constipation means that you have bowel movements that are less frequent than usual. Stools often become very hard and difficult to pass.  Constipation is very common. At some point in life it affects almost everyone. Since everyone's bowel habits are different, what is constipation to one person may not be to another. Your healthcare provider may do tests to diagnose constipation. It depends on what he or she finds when evaluating you.    Symptoms of constipation include:  Abdominal pain  Bloating  Vomiting  Painful bowel movements  Itching, swelling, bleeding, or pain around the anus  Causes  Constipation can have many causes. These include:  Diet low in fiber  Too much dairy  Not drinking enough liquids  Lack of exercise or physical activity. This is especially true for older adults.  Changes in lifestyle or daily routine, including pregnancy, aging, work, and travel  Frequent use or misuse of laxatives  Ignoring the urge to have a bowel movement or delaying it until later  Medicines, such as certain prescription pain medicines, iron supplements, antacids, certain antidepressants, and calcium supplements  Diseases like irritable bowel syndrome, bowel obstructions, stroke, diabetes, thyroid disease, Parkinson disease, hemorrhoids, and colon cancer  Complications  Potential complications of constipation can include:  Hemorrhoids  Rectal bleeding from hemorrhoids or anal fissures (skin tears)  Hernias  Dependency on laxatives  Chronic constipation  Fecal impaction  Bowel obstruction or perforation  Home care  All treatment should be done after talking with your healthcare provider. This is especially true if you have another medical problems, are taking prescription medicines, or are an older adult. Treatment most often involves lifestyle changes. You may also need medicines. Your healthcare provider will tell you which will work best for you. Follow the advice below to help avoid this problem  in the future.  Lifestyle changes  These lifestyle changes can help prevent constipation:  Diet. Eat a high-fiber diet, with fresh fruit and vegetables, and reduce dairy intake, meats, and processed foods  Fluids. It's important to get enough fluids each day. Drink plenty of water when you eat more fiber. If you are on diet that limits the amount of fluid you can have, talk about this with your healthcare provider.  Regular exercise. Check with your healthcare provider first.  Medications  Take any medicines as directed. Some laxatives are safe to use only every now and then. Others can be taken on a regular basis. Talk with your doctor or pharmacist if you have questions.  Prescription pain medicines can cause constipation. If you are taking this kind of medicine, ask your healthcare provider if you should also take a stool softener.  Medicines you may take to treat constipation include:  Fiber supplements  Stool softeners  Laxatives  Enemas  Rectal suppositories  Follow-up care  Follow up with your healthcare provider if symptoms don't get better in the next few days. You may need to have more tests or see a specialist.  Call 911  Call 911 if any of these occur:  Trouble breathing  Stiff, rigid abdomen that is severely painful to touch  Confusion  Fainting or loss of consciousness  Rapid heart rate  Chest pain  When to seek medical advice  Call your healthcare provider right away if any of these occur:  Fever over 100.4°F (38°C)  Failure to resume normal bowel movements  Pain in your abdomen or back gets worse  Nausea or vomiting  Swelling in your abdomen  Blood in the stool  Black, tarry stool  Involuntary weight loss  Weakness  Date Last Reviewed: 12/30/2015  © 9557-4061 [employer]. [address] [place] [address], [place] [address]. All rights reserved. This information is not intended as a substitute for professional medical care. Always follow your healthcare professional's instructions. GERD  "(Adult)    The esophagus is a tube that carries food from the mouth to the stomach. A valve at the lower end of the esophagus prevents stomach acid from flowing upward. When this valve doesn't work properly, stomach contents may repeatedly flow back up (reflux) into the esophagus. This is called gastroesophageal reflux disease (GERD). GERD can irritate the esophagus. It can cause problems with swallowing or breathing. In severe cases, GERD can cause recurrent pneumonia or other serious problems.  Symptoms of reflux include burning, pressure or sharp pain in the upper abdomen or mid to lower chest. The pain can spread to the neck, back, or shoulder. There may be belching, an acid taste in the back of the throat, chronic cough, or sore throat or hoarseness. GERD symptoms often occur during the day after a big meal. They can also occur at night when lying down.   Home care  Lifestyle changes can help reduce symptoms. If needed, medicines may be prescribed. Symptoms often improve with treatment, but if treatment is stopped, the symptoms often return after a few months. So most persons with GERD will need to continue treatment.  Lifestyle changes  Limit or avoid fatty, fried, and spicy foods, as well as coffee, chocolate, mint, and foods with high acid content such as tomatoes and citrus fruit and juices (orange, grapefruit, lemon).  Dont eat large meals, especially at night. Frequent, smaller meals are best. Do not lie down right after eating. And dont eat anything 3 hours before going to bed.  Avoid drinking alcohol and smoking. As much as possible, stay away from second hand smoke.  If you are overweight, losing weight will reduce symptoms.   Avoid wearing tight clothing around your stomach area.  If your symptoms occur during sleep, use a foam wedge to elevate your upper body (not just your head.) Or, place 4" blocks under the head of your bed.  Medicines  If needed, medicines can help relieve the symptoms of GERD " and prevent damage to the esophagus. Discuss a medicine plan with your healthcare provider. This may include one or more of the following medicines:  Antacids to help neutralize the normal acids in your stomach.  Acid blockers (H2 blockers) to decrease acid production.  Acid inhibitors (PPIs) to decrease acid production in a different way than the blockers. They may work better, but can take a little longer to take effect.  Take an antacid 30-60 minutes after eating and at bedtime, but not at the same time as an acid blocker.  Try not to take medicines such as ibuprofen and aspirin. If you are taking aspirin for your heart or other medical reasons, talk to your healthcare provider about stopping it.  Follow-up care  Follow up with your healthcare provider or as advised by our staff.  When to seek medical advice  Call your healthcare provider if any of the following occur:  Stomach pain gets worse or moves to the lower right abdomen (appendix area)  Chest pain appears or gets worse, or spreads to the back, neck, shoulder, or arm  Frequent vomiting (cant keep down liquids)  Blood in the stool or vomit (red or black in color)  Feeling weak or dizzy  Fever of 100.4ºF (38ºC) or higher, or as directed by your healthcare provider  Date Last Reviewed: 6/23/2015  © 4253-5458 [employer]. [address] [place] [address], [place] [address]. All rights reserved. This information is not intended as a substitute for professional medical care. Always follow your healthcare professional's instructions.           Hemorrhoids    Hemorrhoids are swollen and inflamed veins inside the rectum and near the anus. The rectum is the last several inches of the colon. The anus is the passage between the rectum and the outside of the body.  Causes  The veins can become swollen due to increased pressure in them. This is most often caused by:  Chronic constipation or diarrhea  Straining when having a bowel movement  Sitting too long on the  toilet  A low-fiber diet  Pregnancy  Symptoms  Bleeding from the rectum (this may be noticeable after bowel movements)  Lump near the anus  Itching around the anus  Pain around the anus  There are different types of hemorrhoids. Depending on the type you have and the severity, you may be able to treat yourself at home. In some cases, a procedure may be the best treatment option. Your healthcare provider can tell you more about this, if needed.  Home care  General care  To get relief from pain or itching, try:  Topical products. Your healthcare provider may prescribe or recommend creams, ointments, or pads that can be applied to the hemorrhoid. Use these exactly as directed.  Medicines. Your healthcare provider may recommend stool softeners, suppositories, or laxatives to help manage constipation. Use these exactly as directed.  Sitz baths. A sitz bath involves sitting in a few inches of warm bath water. Be careful not to make the water so hot that you burn yourself--test it before sitting in it. Soak for about 10 to 15 minutes a few times a day. This may help relieve pain.  Tips to help prevent hemorrhoids  Eat more fiber. Fiber adds bulk to stool and absorbs water as it moves through your colon. This makes stool softer and easier to pass.  Increase the fiber in your diet with more fiber-rich foods. These include fresh fruit, vegetables, and whole grains.  Take a fiber supplement or bulking agent, if advised to by your provider. These include products such as psyllium or methylcellulose.  Drink plenty of water, if directed to by your provider. This can help keep stool soft.  Be more active. Frequent exercise aids digestion and helps prevent constipation. It may also help make bowel movements more regular.  Dont strain during bowel movements. This can make hemorrhoids more likely. Also, dont sit on the toilet for long periods of time.  Follow-up care  Follow up with your healthcare provider, or as advised. If a  culture or imaging tests were done, you will be notified of the results when they are ready. This may take a few days or longer.  When to seek medical advice  Call your healthcare provider right away if any of these occur:  Increased bleeding from the rectum  Increased pain around the rectum or anus  Weakness or dizziness  Call 911  Call 911 or return to the emergency department right away if any of these occur:  Trouble breathing or swallowing  Fainting or loss of consciousness  Unusually fast heart rate  Vomiting blood  Large amounts of blood in stool  Date Last Reviewed: 6/22/2015 © 2000-2017 [employer]. [address] [place] [address]. All rights reserved. This information is not intended as a substitute for professional medical care. Always follow your healthcare professional's instructions.         Lower GI Bleeding (Stable)  You have signs of blood in your stool. This is called rectal bleeding. The bleeding may have begun in another part of your gastrointestinal (GI) tract. If the blood is bright red, it is likely coming from the lower part of the GI tract. If the blood is black or dark, it might be coming from higher up in the GI tract. Very small amounts of GI bleeding may not be visible and can only be discovered during a test on your stool. Possible causes of lower GI bleeding include:  Hemorrhoids  Anal fissures  Diverticulitis  Inflammatory bowel disease (Crohn's disease or ulcerative colitis)  Polyps (growths) in the intestine  Note: Iron supplements and medicines for diarrhea or upset stomach can cause black stools. Foods such as licorice and red beets can also discolor the stool and be mistaken for bleeding. These are not bleeding and are not a cause for alarm.  Home care  You have not lost a large amount of blood and your condition appears stable at this time. You may resume normal activity as long as you feel well.  Avoid NSAIDs, such as aspirin, ibuprofen, or naproxen.  They can irritate the stomach and cause further bleeding. If you are taking these medicines for other medical reasons, talk to your healthcare provider before you stop them.   Follow-up care  Follow up with your healthcare provider as advised. Further tests may be needed to find the cause of your bleeding.  When to seek medical advice  Call your healthcare provider for any of the following:  Large amount of rectal bleeding   Increasing abdominal pain  Weakness, dizziness  Call 911  Get emergency medical care if any of the following occur:  Loss of consciousness  Vomiting blood  Date Last Reviewed: 6/24/2015  © 3725-6755 [employer]. [address] [place] [address]. All rights reserved. This information is not intended as a substitute for professional medical care. Always follow your healthcare professional's instructions.

## 2022-03-02 NOTE — TELEPHONE ENCOUNTER
----- Message from Simran Rey LPN sent at 3/2/2022  3:32 PM CST -----    ----- Message -----  From: MEAGHAN Gandhi  Sent: 3/2/2022   3:00 PM CST  To: Melanie VALENZUELA Staff    Please schedule patient for EGD with Dr. Colunga as ordered.  Thanks  [facility]

## 2022-03-02 NOTE — Clinical Note
Please schedule patient for EGD with Dr. Colunga as ordered. Thanks [facility]

## 2022-03-02 NOTE — TELEPHONE ENCOUNTER
----- Message from Wendy Velazco sent at 3/2/2022  4:13 PM CST -----  Contact: pt  Type:  Patient Returning Call    Who Called:  pt  Who Left Message for Patient:  Jesika Alanis  Does the patient know what this is regarding?:  yes  Best Call Back Number:  [phone]  Additional Information:

## 2022-03-02 NOTE — TELEPHONE ENCOUNTER
Attempted to reach patient to set up EGD, left message, clinic number provided, my chart message sent as well.

## 2022-03-02 NOTE — PROGRESS NOTES
Subjective:       Patient ID: Miri Earl is a 41 y.o. female Body mass index is 39.53 kg/m².    Chief Complaint: Gastroesophageal Reflux    Established patient of Dr. Colunga, hepatology,JONNY POWELL, & myself.    The patient location is: in [place]. I verified patient name and date of birth. Patient provided current height and weight measurements.  The chief complaint leading to consultation is: GERD    Visit type: audiovisual    Face to Face time with patient: 20 minutes  49 minutes of total time spent on the encounter, which includes face to face time and non-face to face time preparing to see the patient (eg, review of tests), Obtaining and/or reviewing separately obtained history, Documenting clinical information in the electronic or other health record, Independently interpreting results (not separately reported) and communicating results to the patient/family/caregiver, or Care coordination (not separately reported).     Each patient to whom he or she provides medical services by telemedicine is:  (1) informed of the relationship between the physician and patient and the respective role of any other health care provider with respect to management of the patient; and (2) notified that he or she may decline to receive medical services by telemedicine and may withdraw from such care at any time.    GI Problem  The primary symptoms include weight loss (trying to lose weight with dieting), nausea (occasional; zofran prn or phenergan PRN), diarrhea (rarely; intermittent; less frequent lately; reports diagnosed with IBS in the past by Dr. Colunga; denies currently) and hematochezia (occasional when she is constipated; moderate amount of bright red blood on tissue & in bowl; denies bleeding in between bowel movements). Primary symptoms do not include fever, fatigue, abdominal pain, vomiting, melena or hematemesis. The illness began more than 7 days ago (reflux worsened a couple of months ago, switched to dexilant 30  mg daily by PCP which has helped the dysphagia/globus sensation but reflux has persisted). The problem has been gradually improving.   Risk factors: denies recent antibiotic/hospitalization, foreign travel, ill contacts, or suspect food intake.   The illness is also significant for constipation (occasional; last had last week; denies currently, bowel movements are once daily currently of formed stool). The illness does not include chills, dysphagia (has resolved since taking dexilant; egd with dilation helped in the past) or odynophagia. Associated symptoms comments: Diarrhea triggered worse with dairy (she avoids dairy). Significant associated medical issues include GERD (occurs daily, despite taking dexilant 30 mg once daily; rolaids PRN, pepto PRN, pepcid OTC once daily; PAST TREATMENT: zantac stopped in 4/2020 due to FDA recall, nexium helped first time she tried it but not effective the last time tried, prilosec), irritable bowel syndrome (PAST TREATMENT: colestid) and hemorrhoids. Associated medical issues do not include inflammatory bowel disease.     Review of Systems   Constitutional: Positive for weight loss (trying to lose weight with dieting). Negative for appetite change, chills, fatigue and fever.        Reports takes oxycodone PRN- taking a few times a month; also has pain pump in place with morphine; also taking Toradol PO PRN- takes about once a week   HENT: Negative for sore throat and trouble swallowing.    Respiratory: Negative for cough, choking and shortness of breath.    Cardiovascular: Negative for chest pain.   Gastrointestinal: Positive for anal bleeding, constipation (occasional; last had last week; denies currently, bowel movements are once daily currently of formed stool), diarrhea (rarely; intermittent; less frequent lately; reports diagnosed with IBS in the past by Dr. Colunga; denies currently), hematochezia (occasional when she is constipated; moderate amount of bright red blood on  tissue & in bowl; denies bleeding in between bowel movements), nausea (occasional; zofran prn or phenergan PRN) and rectal pain (occasional; relates to external hemorrhoid). Negative for abdominal pain, dysphagia (has resolved since taking dexilant; egd with dilation helped in the past), hematemesis, melena and vomiting.   Neurological: Negative for weakness.       No LMP recorded. Patient has had a hysterectomy.  Past Medical History:   Diagnosis Date    Anxiety     Arthritis     Asthma     Breast cyst     Chronic back pain     Colon polyp     Depression     Diabetes mellitus     Elevated liver enzymes     Fibrocystic breast     Fibromyalgia     GERD (gastroesophageal reflux disease)     History of Chiari malformation     History of colitis     History of gastritis     Hypertension     Hypokalemia     IBS (irritable bowel syndrome)     Insomnia     MVP (mitral valve prolapse)     HERNANDEZ (nonalcoholic steatohepatitis) 2021    Neuropathy     Postmenopausal HRT (hormone replacement therapy)     Seasonal allergies     Sleep apnea with use of continuous positive airway pressure (CPAP)     Spondylitis     Thyroid nodule      Past Surgical History:   Procedure Laterality Date    APPENDECTOMY  10/15/2009  [place]    BACK SURGERY      BACK SURGERY      xs 4    BACK SURGERY       SECTION      x 3    CHOLECYSTECTOMY      COLONOSCOPY    [place]    COLONOSCOPY  08/15/2014    Dr. Colunga: 1 colon polyp removed, hemorrhoids, Mild colonic spasm consistent with irritable bowel syndrome; repeat in 5 years for surveillance; biopsy: TUBULAR ADENOMA.    COLONOSCOPY N/A 10/21/2020    Procedure: COLONOSCOPY;  Surgeon: Earl Colunga Jr., MD; 2 colon polyps removed, hemorrhoids, Mild colonic spasm consistent with irritable bowel syndrome. repeat in 5 years for surveillance; biopsy: polyps- TUBULAR ADENOMA & hyperplastic polyp; random TI & colon biopsies WNL    COLONOSCOPY W/ POLYPECTOMY   10/31/2008  [facility]    2 mm polyp in the sigmoid colon, TUBULAR ADENOMATOUS POLYP.  Erythematous mucosa rectum, proctitis (prep  proctitis?). Irritable bowel syndrome.     ESOPHAGOGASTRODUODENOSCOPY  08/15/2014    Dr. Colunga: Reflux esophagitis, No endoscopic esophageal abnormality to explain patient's dysphagia. Esophagus dilated, 54FR, history/examination was suspicious for an esophageal spasm; gastritis; biopsy: stomach- unremarkable, bishop test negative    ESOPHAGOGASTRODUODENOSCOPY N/A 10/21/2020    Surgeon: Earl Colunga Jr., MD;  [facility] END[facility]; Reflux esophagitis; Gastritis. Enlarged gastric folds; No endoscopic esophageal abnormality to explain patient's dysphagia. Esophagus dilated, 51FR; biopsy: duodenum WNL; stomach- REACTIVE GASTRITIS, negative H pylori    GANGLION CYST EXCISION Right     HYSTERECTOMY  10/2007  [place]    LIVER BIOPSY  10/20/2008    Mild portal mixed leukocytic infiltrate,neutrophils and lymphocytes.  Bile ducts seen, negative for leukocytic infiltration.  Negative for steatohepatitis. Mild sinusoidal dilatation and congestion. Negative for granulomata nor malignancy.  Reticulum stain negative for hepaticfibrosis.  Negative iron stain.  PAS stain, negative for PAS-positive inclusions.    LUMBAR DISCECTOMY      LUMBAR FUSION      MYELOGRAPHY N/A 01/07/2019    Procedure: Myelogram lumbar and thoracic;  Surgeon: Hari Rich MD;  Location: [facility];  Service: Radiology;  Laterality: N/A;    neurostimulator removal       OOPHORECTOMY      pain pump      PELVIC LAPAROSCOPY      SELECTIVE INJECTION OF ANESTHETIC AGENT AROUND LUMBAR SPINAL NERVE ROOT BY TRANSFORAMINAL APPROACH Bilateral 01/30/2019    Procedure: BLOCK, SPINAL NERVE ROOT, LUMBAR, SELECTIVE, TRANSFORAMINAL APPROACH; L3;  Surgeon: Tez Edouard MD;  Location: [facility];  Service: Pain Management;  Laterality: Bilateral;    SPINAL CORD STIMULATOR IMPLANT      UPPER GASTROINTESTINAL ENDOSCOPY  10/31/2008   [facility]    Normal esophagus. Lax LES, non-erosive esophageal reflux (NERD?) Erythematous gastropathy on greater curve.  PRABHU Test performed on 10/31/08 was Negative.      Family History   Problem Relation Age of Onset    Diabetes Mother     Hypertension Mother     Diabetes Father     Hypertension Father     Colon cancer Neg Hx     Crohn's disease Neg Hx     Ulcerative colitis Neg Hx     Stomach cancer Neg Hx     Esophageal cancer Neg Hx     Cirrhosis Neg Hx      Social History     Tobacco Use    Smoking status: Current Every Day Smoker     Packs/day: 1.00     Years: 17.00     Pack years: 17.00     Types: Vaping with nicotine    Smokeless tobacco: Never Used   Substance Use Topics    Alcohol use: Not Currently    Drug use: Yes     Types: Oxycodone     Comment: medical marijuana      Wt Readings from Last 10 Encounters:   03/02/22 117.9 kg (260 lb)   10/05/21 121.6 kg (268 lb)   07/22/21 128.4 kg (283 lb 1.1 oz)   07/06/21 128 kg (282 lb 3 oz)   06/16/21 131.5 kg (290 lb)   06/08/21 129.3 kg (285 lb 0.9 oz)   06/06/21 129.3 kg (285 lb)   05/25/21 129.3 kg (285 lb)   05/13/21 130.9 kg (288 lb 9.6 oz)   10/21/20 127 kg (280 lb)     Lab Results   Component Value Date    WBC 8.65 05/25/2021    HGB 14.0 05/25/2021    HCT 42.5 05/25/2021    MCV 91 05/25/2021     05/25/2021     CMP  Sodium   Date Value Ref Range Status   05/08/2021 139 136 - 145 mmol/L Final   10/15/2015 143 137 - 145 MMOL/L Final     Potassium   Date Value Ref Range Status   05/08/2021 4.2 3.5 - 5.1 mmol/L Final   10/15/2015 4.6 3.5 - 5.1 MMOL/L Final     Chloride   Date Value Ref Range Status   05/08/2021 104 95 - 110 mmol/L Final   10/15/2015 109 (H) 98 - 107 MMOL/L Final     CO2   Date Value Ref Range Status   05/08/2021 25 22 - 31 mmol/L Final     Glucose   Date Value Ref Range Status   05/08/2021 138 (H) 70 - 110 mg/dL Final     Comment:     The ADA recommends the following guidelines for fasting glucose:    Normal:       less than  100 mg/dL    Prediabetes:  100 mg/dL to 125 mg/dL    Diabetes:     126 mg/dL or higher       BUN   Date Value Ref Range Status   05/08/2021 6 (L) 7 - 18 mg/dL Final     Creatinine   Date Value Ref Range Status   07/10/2021 0.8 0.5 - 1.4 mg/dL Final   10/15/2015 0.84 0.52 - 1.04 MG/DL Final     Calcium   Date Value Ref Range Status   05/08/2021 9.4 8.4 - 10.2 mg/dL Final     Total Protein   Date Value Ref Range Status   12/23/2021 6.9 6.0 - 8.4 g/dL Final     Albumin   Date Value Ref Range Status   12/23/2021 3.8 3.5 - 5.2 g/dL Final   10/15/2015 4.0 3.5 - 5.0 G/DL Final     Total Bilirubin   Date Value Ref Range Status   12/23/2021 0.5 0.2 - 1.3 mg/dL Final     Alkaline Phosphatase   Date Value Ref Range Status   12/23/2021 148 (H) 38 - 145 U/L Final     AST ([place])   Date Value Ref Range Status   01/19/2016 26 14 - 36 U/L Final     AST   Date Value Ref Range Status   12/23/2021 35 14 - 36 U/L Final     ALT   Date Value Ref Range Status   12/23/2021 53 (H) 0 - 35 U/L Final     Anion Gap   Date Value Ref Range Status   05/08/2021 10 8 - 16 mmol/L Final     eGFR if    Date Value Ref Range Status   07/10/2021 >60 >60 mL/min/1.73 m^2 Final     eGFR if non    Date Value Ref Range Status   07/10/2021 >60 >60 mL/min/1.73 m^2 Final     Comment:     Calculation used to obtain the estimated glomerular filtration  rate (eGFR) is the CKD-EPI equation.        Lab Results   Component Value Date    HEPAIGM Negative 09/21/2020    HEPBIGM Negative 09/21/2020    HEPCAB Negative 09/21/2020     Lab Results   Component Value Date    LIPASE 40 01/23/2017     Lab Results   Component Value Date    TSH 0.236 (L) 11/13/2021     10/21/2020 & 9/21/2020 stool studies reviewed (acidic stools)    Reviewed prior medical records including radiology report of 7/12/2021 CT abdomen; 4/6/2021 limited abdominal ultrasound; 12/27/2021 visit note with JONNY POWELL with hepatology; & endoscopy history (see surgical  history).    Objective:      Physical Exam  Constitutional:       General: She is not in acute distress.     Appearance: She is well-developed.   Pulmonary:      Effort: Pulmonary effort is normal. No respiratory distress.   Skin:     Coloration: Skin is not pale.   Neurological:      Mental Status: She is alert and oriented to person, place, and time.   Psychiatric:         Speech: Speech normal.         Behavior: Behavior normal.         Thought Content: Thought content normal.         Judgment: Judgment normal.         Assessment:       1. Heartburn    2. Nausea    3. S/P cholecystectomy    4. Elevated LFTs    5. Hepatic steatosis    6. Constipation due to opioid therapy    7. Rectal pain    8. Hematochezia    9. History of hemorrhoids        Plan:       Heartburn  -  INCREASE TO   dexlansoprazole (DEXILANT) 60 mg capsule; Take 1 capsule (60 mg total) by mouth before breakfast.  Dispense: 30 capsule; Refill: 11  - schedule EGD, discussed procedure with patient, including risks and benefits, patient verbalized understanding  - avoid/minimize use of NSAIDs- since they can cause GI upset, bleeding and/or ulcers. If NSAID must be taken, recommend take with food.    Nausea  - schedule EGD, discussed procedure with patient, including risks and benefits, patient verbalized understanding  - CONTINUE ZOFRAN PRN AS DIRECTED FOR NAUSEA OR PHENERGAN PRN AS DIRECTED FOR NAUSEA (advised not to take together)  - discussed with patient that a side effect of narcotic pain medications is constipation & nausea, advised patient to talk to provider who manages pain medication, patient verbalized understanding    S/P cholecystectomy    Elevated LFTs & Hepatic steatosis  For fatty liver recommend: low fat, low cholesterol diet, maintain good control of blood sugars and cholesterol levels, exercise, weight loss (if overweight), minimize/avoid alcohol and tylenol products, & follow-up with PCP for continued evaluation and management; if  specialist is needed, recommend seeing hepatology.    Constipation due to opioid therapy  -  START   naldemedine (SYMPROIC) 0.2 mg Tab; Take 0.2 mg by mouth once daily.  Dispense: 30 tablet; Refill: 3  Recommend daily exercise as tolerated, adequate water intake (six 8-oz glasses of water daily), and high fiber diet. OTC fiber supplements are recommended if diet does not reach daily fiber goal (20-30 grams daily), such as Metamucil, Citrucel, or FiberCon (take as directed, separate from other oral medications by >2 hours).  - Possible COLONOSCOPY pending results of testing and if symptoms persist    Rectal pain  -     Ambulatory referral/consult to Colorectal Surgery ([place]); Future; Expected date: 03/09/2022    Hematochezia  -     CBC Without Differential; Future; Expected date: 03/02/2022  -     Ambulatory referral/consult to Colorectal Surgery ([place]); Future; Expected date: 03/09/2022  - reviewed last colonoscopy which showed hemorrhoids and colon polyps which the polyps were removed; recommend treating hemorrhoids at this time; if bleeding persist/worsens and pending results of blood work, then recommend to follow-up for continued evaluation & management to include lower endoscopy or go to the ER if bleeding is severe  - discussed about different etiologies that can cause rectal bleeding, such as but not limited to diverticulosis, polyps, colon mass, colon inflammation or infection, anal fissure or hemorrhoids.   - You may resume normal activity as long as you feel well.  - Avoid/minimize NSAIDs such as ibuprofen (Advil, Motrin) and naproxen (Aleve and Naprosyn).  - Avoid alcohol.    History of hemorrhoids  -     Ambulatory referral/consult to Colorectal Surgery ([place]); Future; Expected date: 03/09/2022  - avoid constipation and straining with bowel movements; try using an OTC stool softener as directed and increase fiber in diet (20-30 grams daily)/OTC fiber supplement such as metamucil  (take as directed)  - recommend SITZ baths    Follow up in about 1 month (around 4/2/2022), or if symptoms worsen or fail to improve.      If no improvement in symptoms or symptoms worsen, call/follow-up at clinic or go to ER.

## 2022-03-03 ENCOUNTER — TELEPHONE (OUTPATIENT)
Dept: ENDOCRINOLOGY | Facility: CLINIC | Age: 42
End: 2022-03-03
Payer: COMMERCIAL

## 2022-03-03 ENCOUNTER — TELEPHONE (OUTPATIENT)
Dept: GASTROENTEROLOGY | Facility: CLINIC | Age: 42
End: 2022-03-03
Payer: COMMERCIAL

## 2022-03-03 NOTE — TELEPHONE ENCOUNTER
----- Message from MEAGHAN Gandhi sent at 3/3/2022  7:37 AM CST -----  Please call to inform & review the results with the patient- Lab results are normal: blood counts showed no anemia.   Continue with previous recommendations.  Thanks,  Susan HARDING-C

## 2022-03-03 NOTE — TELEPHONE ENCOUNTER
Let her know her TSH is slightly suppressed but better than before. Would like to monitor for now.   Will repeat TSH when comes in for f/u in Dec

## 2022-03-04 ENCOUNTER — PATIENT MESSAGE (OUTPATIENT)
Dept: GASTROENTEROLOGY | Facility: CLINIC | Age: 42
End: 2022-03-04
Payer: COMMERCIAL

## 2022-04-27 ENCOUNTER — PATIENT MESSAGE (OUTPATIENT)
Dept: GASTROENTEROLOGY | Facility: CLINIC | Age: 42
End: 2022-04-27
Payer: COMMERCIAL

## 2022-05-01 ENCOUNTER — LAB VISIT (OUTPATIENT)
Dept: FAMILY MEDICINE | Facility: CLINIC | Age: 42
End: 2022-05-01
Payer: COMMERCIAL

## 2022-05-01 DIAGNOSIS — Z01.818 PRE-OP TESTING: ICD-10-CM

## 2022-05-01 PROCEDURE — U0005 INFEC AGEN DETEC AMPLI PROBE: HCPCS | Performed by: INTERNAL MEDICINE

## 2022-05-01 PROCEDURE — U0003 INFECTIOUS AGENT DETECTION BY NUCLEIC ACID (DNA OR RNA); SEVERE ACUTE RESPIRATORY SYNDROME CORONAVIRUS 2 (SARS-COV-2) (CORONAVIRUS DISEASE [COVID-19]), AMPLIFIED PROBE TECHNIQUE, MAKING USE OF HIGH THROUGHPUT TECHNOLOGIES AS DESCRIBED BY CMS-2020-01-R: HCPCS | Performed by: INTERNAL MEDICINE

## 2022-05-02 ENCOUNTER — PATIENT MESSAGE (OUTPATIENT)
Dept: ENDOCRINOLOGY | Facility: CLINIC | Age: 42
End: 2022-05-02
Payer: COMMERCIAL

## 2022-05-02 DIAGNOSIS — E78.2 MIXED HYPERLIPIDEMIA: ICD-10-CM

## 2022-05-02 DIAGNOSIS — E11.9 TYPE 2 DIABETES MELLITUS WITHOUT COMPLICATION, WITHOUT LONG-TERM CURRENT USE OF INSULIN: ICD-10-CM

## 2022-05-02 RX ORDER — LINAGLIPTIN 5 MG/1
5 TABLET, FILM COATED ORAL NIGHTLY
Qty: 30 TABLET | Refills: 6 | Status: SHIPPED | OUTPATIENT
Start: 2022-05-02 | End: 2022-11-10

## 2022-05-02 RX ORDER — ATORVASTATIN CALCIUM 10 MG/1
10 TABLET, FILM COATED ORAL NIGHTLY
Qty: 30 TABLET | Refills: 6 | Status: SHIPPED | OUTPATIENT
Start: 2022-05-02 | End: 2022-10-11

## 2022-05-02 NOTE — TELEPHONE ENCOUNTER
Pt's pcp is no longer in practice, she's asking if you can refill her tradjenta and atorvastatin for now.  OK?

## 2022-05-03 LAB
SARS-COV-2 RNA RESP QL NAA+PROBE: NOT DETECTED
SARS-COV-2- CYCLE NUMBER: NORMAL

## 2022-05-03 NOTE — H&P
History & Physical - Short Stay  Gastroenterology      SUBJECTIVE:     Procedure: Gastroscopy    Chief Complaint/Indication for Procedure:  GERD.  Nausea.  Dysphagia.    History of Present Illness:  See recent GI OV note:  Office Visit    3/2/2022  [facility]     MEAGHAN Gandhi    Gastroenterology  Heartburn +8 more    Dx  Gastroesophageal Reflux    Reason for Visit     Progress Notes  MEAGHAN Gandhi (Nurse Practitioner)   Gastroenterology  Subjective:       Patient ID: Miri Earl is a 41 y.o. female Body mass index is 39.53 kg/m².     Chief Complaint: Gastroesophageal Reflux     Established patient of Dr. Colunga, hepatology,JONNY POWELL, & myself.     The patient location is: in [place]. I verified patient name and date of birth. Patient provided current height and weight measurements.  The chief complaint leading to consultation is: GERD     Visit type: audiovisual       GI Problem  The primary symptoms include weight loss (trying to lose weight with dieting), nausea (occasional; zofran prn or phenergan PRN), diarrhea (rarely; intermittent; less frequent lately; reports diagnosed with IBS in the past by Dr. Colunga; denies currently) and hematochezia (occasional when she is constipated; moderate amount of bright red blood on tissue & in bowl; denies bleeding in between bowel movements). Primary symptoms do not include fever, fatigue, abdominal pain, vomiting, melena or hematemesis. The illness began more than 7 days ago (reflux worsened a couple of months ago, switched to dexilant 30 mg daily by PCP which has helped the dysphagia/globus sensation but reflux has persisted). The problem has been gradually improving.   Risk factors: denies recent antibiotic/hospitalization, foreign travel, ill contacts, or suspect food intake.   The illness is also significant for constipation (occasional; last had last week; denies currently, bowel movements are once daily currently of  formed stool). The illness does not include chills, dysphagia (has resolved since taking dexilant; egd with dilation helped in the past) or odynophagia. Associated symptoms comments: Diarrhea triggered worse with dairy (she avoids dairy). Significant associated medical issues include GERD (occurs daily, despite taking dexilant 30 mg once daily; rolaids PRN, pepto PRN, pepcid OTC once daily; PAST TREATMENT: zantac stopped in 4/2020 due to FDA recall, nexium helped first time she tried it but not effective the last time tried, prilosec), irritable bowel syndrome (PAST TREATMENT: colestid) and hemorrhoids. Associated medical issues do not include inflammatory bowel disease.      Review of Systems   Constitutional: Positive for weight loss (trying to lose weight with dieting). Negative for appetite change, chills, fatigue and fever.        Reports takes oxycodone PRN- taking a few times a month; also has pain pump in place with morphine; also taking Toradol PO PRN- takes about once a week   HENT: Negative for sore throat and trouble swallowing.    Respiratory: Negative for cough, choking and shortness of breath.    Cardiovascular: Negative for chest pain.   Gastrointestinal: Positive for anal bleeding, constipation (occasional; last had last week; denies currently, bowel movements are once daily currently of formed stool), diarrhea (rarely; intermittent; less frequent lately; reports diagnosed with IBS in the past by Dr. Colunga; denies currently), hematochezia (occasional when she is constipated; moderate amount of bright red blood on tissue & in bowl; denies bleeding in between bowel movements), nausea (occasional; zofran prn or phenergan PRN) and rectal pain (occasional; relates to external hemorrhoid). Negative for abdominal pain, dysphagia (has resolved since taking dexilant; egd with dilation helped in the past), hematemesis, melena and vomiting.       Assessment:       1. Heartburn    2. Nausea    3. S/P  cholecystectomy    4. Elevated LFTs    5. Hepatic steatosis    6. Constipation due to opioid therapy    7. Rectal pain    8. Hematochezia    9. History of hemorrhoids        Plan:       Heartburn  -  INCREASE TO   dexlansoprazole (DEXILANT) 60 mg capsule; Take 1 capsule (60 mg total) by mouth before breakfast.  Dispense: 30 capsule; Refill: 11  - schedule EGD, discussed procedure with patient, including risks and benefits, patient verbalized understanding  - avoid/minimize use of NSAIDs- since they can cause GI upset, bleeding and/or ulcers. If NSAID must be taken, recommend take with food.     Nausea  - schedule EGD, discussed procedure with patient, including risks and benefits, patient verbalized understanding  - CONTINUE ZOFRAN PRN AS DIRECTED FOR NAUSEA OR PHENERGAN PRN AS DIRECTED FOR NAUSEA (advised not to take together)  - discussed with patient that a side effect of narcotic pain medications is constipation & nausea, advised patient to talk to provider who manages pain medication, patient verbalized understanding     S/P cholecystectomy     Elevated LFTs & Hepatic steatosis  For fatty liver recommend: low fat, low cholesterol diet, maintain good control of blood sugars and cholesterol levels, exercise, weight loss (if overweight), minimize/avoid alcohol and tylenol products, & follow-up with PCP for continued evaluation and management; if specialist is needed, recommend seeing hepatology.     Constipation due to opioid therapy  -  START   naldemedine (SYMPROIC) 0.2 mg Tab; Take 0.2 mg by mouth once daily.  Dispense: 30 tablet; Refill: 3  Recommend daily exercise as tolerated, adequate water intake (six 8-oz glasses of water daily), and high fiber diet. OTC fiber supplements are recommended if diet does not reach daily fiber goal (20-30 grams daily), such as Metamucil, Citrucel, or FiberCon (take as directed, separate from other oral medications by >2 hours).  - Possible COLONOSCOPY pending results of  testing and if symptoms persist     Rectal pain  -     Ambulatory referral/consult to Colorectal Surgery ([place]); Future; Expected date: 03/09/2022     Hematochezia  -     CBC Without Differential; Future; Expected date: 03/02/2022  -     Ambulatory referral/consult to Colorectal Surgery ([facility] [facility]); Future; Expected date: 03/09/2022  - reviewed last colonoscopy which showed hemorrhoids and colon polyps which the polyps were removed; recommend treating hemorrhoids at this time; if bleeding persist/worsens and pending results of blood work, then recommend to follow-up for continued evaluation & management to include lower endoscopy or go to the ER if bleeding is severe  - discussed about different etiologies that can cause rectal bleeding, such as but not limited to diverticulosis, polyps, colon mass, colon inflammation or infection, anal fissure or hemorrhoids.   - You may resume normal activity as long as you feel well.  - Avoid/minimize NSAIDs such as ibuprofen (Advil, Motrin) and naproxen (Aleve and Naprosyn).  - Avoid alcohol.     History of hemorrhoids  -     Ambulatory referral/consult to Colorectal Surgery ([facility] [facility]); Future; Expected date: 03/09/2022  - avoid constipation and straining with bowel movements; try using an OTC stool softener as directed and increase fiber in diet (20-30 grams daily)/OTC fiber supplement such as metamucil (take as directed)  - recommend SITZ baths                 See last Upper GI endoscopy 10/21/2020:  Indications:         Dysphagia, Suspected esophageal reflux, Exclusion of                        esophageal stricture, Diarrhea   Comorbidities        Morbid obesity   Impression:          - Normal oropharynx.                        - Normal upper third of esophagus and middle third                        of esophagus.                        - Reflux esophagitis (minimal).                        - Non-erosive esophageal reflux (NERD) disease                         present.                        - Z-line regular, 38-39 cm from the incisors.                        - Normal cardia.                        - Gastritis. Enlarged gastric folds. Biopsied.                        - Normal stomach otherwise. Biopsied.                        - Normal pylorus.                        - Normal examined duodenum. Biopsied.                        - No endoscopic esophageal abnormality to explain                        patient's dysphagia. Esophagus dilated, 51 Fr.   Recommendation:      - Perform a colonoscopy as previously scheduled.                        - Discharge patient to home after that.                        - Await pathology and CLOtest results.                        - Follow an antireflux regimen.                        - Observe patient's clinical course following                        today's procedure with therapeutic intervention.                        - Continue present medications.                        - Use Nexium (esomeprazole) 40 mg PO daily.                        - Call the G.I. clinic in 2 weeks for reports (if                        you haven't heard from us sooner) [phone].                        - Repeat upper endoscopy PRN for retreatment.                        - Return to GI clinic in 6-8 weeks.                        - If bx c/w PHG, may need CT scan of the liver to                        look HERNANDEZ.   Earl Colunga MD   10/21/2020    1. Stomach, greater curvature, biopsy:  -REACTIVE GASTRITIS  -NEGATIVE FOR ACTIVE INFLAMMATION  -NEGATIVE FOR H. PYLORI BY IMMUNOHISTOCHEMICAL STAIN  -NEGATIVE FOR DYSPLASIA OR MALIGNANCY  2. Stomach, antrum, biopsy:  -REACTIVE GASTRITIS  -NEGATIVE FOR ACTIVE INFLAMMATION  -NEGATIVE FOR H. PYLORI BY IMMUNOHISTOCHEMICAL STAIN  -NEGATIVE FOR DYSPLASIA OR MALIGNANCY  3. Small bowel, duodenum, biopsy:  -BENIGN DUODENUM WITH PRESERVED VILLI  -NEGATIVE FOR SIGNIFICANT INFLAMMATION  -NEGATIVE FOR DYSPLASIA OR  MALIGNANCY        PTA Medications   Medication Sig    albuterol (PROVENTIL/VENTOLIN HFA) 90 mcg/actuation inhaler USE 2 PUFFS EVERY 6 HOURS AS NEEDED FOR WHEEZING    atorvastatin (LIPITOR) 10 MG tablet Take 1 tablet (10 mg total) by mouth every evening.    budesonide (PULMICORT) 0.5 mg/2 mL nebulizer solution USE 2 ML(0.5 MG) VIA NEBULIZER TWICE DAILY AS NEEDED    calcium carbonate-magnesium hydroxide (ROLAIDS) 550-110 mg Chew Take 1 tablet by mouth 2 (two) times daily with meals.    cetirizine (ZYRTEC) 10 MG tablet TAKE 1 TABLET BY MOUTH EVERY DAY    CRAN-VITC-MANNOSE-FOS-BROMELN ORAL Take by mouth.    cyanocobalamin 1,000 mcg/mL injection INJECT 1 ML IN THE MUSCLE EVERY 30 DAYS    dexlansoprazole (DEXILANT) 60 mg capsule Take 1 capsule (60 mg total) by mouth before breakfast.    famotidine (PEPCID) 10 MG tablet Take 10 mg by mouth 2 (two) times daily.    gabapentin (NEURONTIN) 600 MG tablet TAKE 2 TABLETS(1200 MG) BY MOUTH THREE TIMES DAILY    ketorolac (TORADOL) 10 mg tablet Take by mouth daily as needed. TAKE 1 TABLET BY MOUTH EVERY 6 HOURS FOR 5 DAYS    levalbuterol (XOPENEX) 1.25 mg/3 mL nebulizer solution Take 3 mLs (1.25 mg total) by nebulization every 4 (four) hours as needed for Wheezing. Rescue    linaGLIPtin (TRADJENTA) 5 mg Tab tablet Take 1 tablet (5 mg total) by mouth every evening.    lisinopriL (PRINIVIL,ZESTRIL) 2.5 MG tablet TAKE 1 TABLET(2.5 MG) BY MOUTH EVERY EVENING    metoprolol succinate (TOPROL-XL) 50 MG 24 hr tablet Take 1 tablet (50 mg total) by mouth every evening.    montelukast (SINGULAIR) 10 mg tablet TAKE 1 TABLET(10 MG) BY MOUTH EVERY EVENING    morphine 100 mg/100 mL (1 mg/mL) infusion 3.6 mcg/kg/hr continuous. Intrathecal administration per pain pump    ondansetron (ZOFRAN) 8 MG tablet Take 1 tablet (8 mg total) by mouth every 8 (eight) hours. (Patient taking differently: Take 8 mg by mouth every 8 (eight) hours as needed.)    oxyCODONE (ROXICODONE) 10 mg Tab  immediate release tablet TAKE 1 TABLET BY MOUTH EVERY 6 HOURS FOR 3 DAYS    potassium chloride SA (K-DUR,KLOR-CON) 20 MEQ tablet TAKE 1 TABLET(20 MEQ) BY MOUTH EVERY EVENING    promethazine (PHENERGAN) 25 MG tablet Take 25 mg by mouth every 8 (eight) hours as needed.    sodium chloride 0.9% 0.9 % SolP with bupivacaine 0.5% (5 mg/ml) 0.5 % (5 mg/mL) Soln 0.1 % by Epidural route continuous.    diazePAM (VALIUM) 5 MG tablet TAKE 1 TABLET BY MOUTH EVERY 8 HOURS FOR 5 DAYS AS NEEDED FOR SEVERE SPASMS. DO NOT TAKE WITHIN 2 HOURS OF OPIODS    EPINEPHrine (EPIPEN) 0.3 mg/0.3 mL AtIn as needed.    naldemedine (SYMPROIC) 0.2 mg Tab Take 0.2 mg by mouth once daily. (Patient not taking: No sig reported)       Review of patient's allergies indicates:   Allergen Reactions    Doxycycline Anaphylaxis    Iodinated contrast media Anaphylaxis    Latex, natural rubber Anaphylaxis    Shellfish containing products Anaphylaxis    Morphine Other (See Comments)     Hallucinations only with oral use        Past Medical History:   Diagnosis Date    Anxiety     Arthritis     Asthma     Breast cyst     Chronic back pain     Colon polyp     Depression     Diabetes mellitus     Elevated liver enzymes     Fibrocystic breast     Fibromyalgia     GERD (gastroesophageal reflux disease)     History of Chiari malformation     History of colitis     History of gastritis     Hypertension     Hypokalemia     IBS (irritable bowel syndrome)     Insomnia     MVP (mitral valve prolapse)     HERNANDEZ (nonalcoholic steatohepatitis) 2021    Neuropathy     Postmenopausal HRT (hormone replacement therapy)     Seasonal allergies     Sleep apnea with use of continuous positive airway pressure (CPAP)     Spondylitis     Thyroid nodule      Past Surgical History:   Procedure Laterality Date    APPEN[facility]OMY  10/15/2009  [facility]    BACK SURGERY      BACK SURGERY      xs 4    BACK SURGERY       SECTION      x 3     CHOLECYSTECTOMY      COLONOSCOPY  2006  [place]    COLONOSCOPY  08/15/2014    Dr. Colunga: 1 colon polyp removed, hemorrhoids, Mild colonic spasm consistent with irritable bowel syndrome; repeat in 5 years for surveillance; biopsy: TUBULAR ADENOMA.    COLONOSCOPY N/A 10/21/2020    Procedure: COLONOSCOPY;  Surgeon: Earl Colunga Jr., MD; 2 colon polyps removed, hemorrhoids, Mild colonic spasm consistent with irritable bowel syndrome. repeat in 5 years for surveillance; biopsy: polyps- TUBULAR ADENOMA & hyperplastic polyp; random TI & colon biopsies WNL    COLONOSCOPY W/ POLYPECTOMY  10/31/2008  [facility]    2 mm polyp in the sigmoid colon, TUBULAR ADENOMATOUS POLYP.  Erythematous mucosa rectum, proctitis (prep  proctitis?). Irritable bowel syndrome.     ESOPHAGOGASTRODUODENOSCOPY  08/15/2014    Dr. Colunga: Reflux esophagitis, No endoscopic esophageal abnormality to explain patient's dysphagia. Esophagus dilated, 54FR, history/examination was suspicious for an esophageal spasm; gastritis; biopsy: stomach- unremarkable, bishop test negative    ESOPHAGOGASTRODUODENOSCOPY N/A 10/21/2020    Surgeon: Earl Colunga Jr., MD;  Location: [facility]; Reflux esophagitis; Gastritis. Enlarged gastric folds; No endoscopic esophageal abnormality to explain patient's dysphagia. Esophagus dilated, 51FR; biopsy: duodenum WNL; stomach- REACTIVE GASTRITIS, negative H pylori    GANGLION CYST EXCISION Right     HYSTERECTOMY  10/2007  [facility]    LIVER BIOPSY  10/20/2008    Mild portal mixed leukocytic infiltrate,neutrophils and lymphocytes.  Bile ducts seen, negative for leukocytic infiltration.  Negative for steatohepatitis. Mild sinusoidal dilatation and congestion. Negative for granulomata nor malignancy.  Reticulum stain negative for hepaticfibrosis.  Negative iron stain.  PAS stain, negative for PAS-positive inclusions.    LUMBAR DISCECTOMY      LUMBAR FUSION      MYELOGRAPHY N/A 01/07/2019    Procedure: Myelogram lumbar and  "thoracic;  Surgeon: Hari Rich MD;  [facility];  Service: Radiology;  Laterality: N/A;    neurostimulator removal       OOPHORECTOMY      pain pump      PELVIC LAPAROSCOPY      SELECTIVE INJECTION OF ANESTHETIC AGENT AROUND LUMBAR SPINAL NERVE ROOT BY TRANSFORAMINAL APPROACH Bilateral 01/30/2019    Procedure: BLOCK, SPINAL NERVE ROOT, LUMBAR, SELECTIVE, TRANSFORAMINAL APPROACH; L3;  Surgeon: Tez Edouard MD;  [facility];  Service: Pain Management;  Laterality: Bilateral;    SPINAL CORD STIMULATOR IMPLANT      UPPER GASTROINTESTINAL ENDOSCOPY  10/31/2008  [facility]    Normal esophagus. Lax LES, non-erosive esophageal reflux (NERD?) Erythematous gastropathy on greater curve.  PRABHU Test performed on 10/31/08 was Negative.      Family History   Problem Relation Age of Onset    Diabetes Mother     Hypertension Mother     Diabetes Father     Hypertension Father     Colon cancer Neg Hx     Crohn's disease Neg Hx     Ulcerative colitis Neg Hx     Stomach cancer Neg Hx     Esophageal cancer Neg Hx     Cirrhosis Neg Hx      Social History     Tobacco Use    Smoking status: Current Every Day Smoker     Packs/day: 1.00     Years: 17.00     Pack years: 17.00     Types: Vaping with nicotine    Smokeless tobacco: Never Used   Substance Use Topics    Alcohol use: Not Currently    Drug use: Yes     Types: Oxycodone     Comment: medical marijuana          OBJECTIVE:     Vital Signs (Most Recent)  Temp: 97.5 °F (36.4 °C) (05/04/22 0713)  Pulse: 93 (05/04/22 0713)  Resp: 18 (05/04/22 0713)  BP: (!) 110/56 (05/04/22 0713)  SpO2: 98 % (05/04/22 0713)    Physical Exam:  :Ht: 5' 8" (172.7 cm)    Wt: 117.9 kg (260 lb)   BMI: 39.53 kg/m²                                                          GENERAL:  Comfortable, in no acute distress.                                 HEENT EXAM:  Nonicteric.  No adenopathy.  Oropharynx is clear.               NECK:  Supple.                                        "                        LUNGS:  Clear.                                                               CARDIAC:  Regular rate and rhythm.  S1, S2.  No murmur.                      ABDOMEN:  OBESE.  Soft, positive bowel sounds, nontender.  No hepatosplenomegaly or masses.  No rebound or guarding.                                             EXTREMITIES:  No edema.     MENTAL STATUS:  Alert and oriented.    ASSESSMENT/PLAN:     Assessment: GERD.  Nausea.  Dysphagia.    Plan: Gastroscopy    Anesthesia Plan:   MAC / General Anaesthesia    ASA Grade: ASA 2 - Patient with mild systemic disease with no functional limitations    MALLAMPATI SCORE: II (hard and soft palate, upper portion of tonsils anduvula visible)

## 2022-05-04 ENCOUNTER — HOSPITAL ENCOUNTER (OUTPATIENT)
Facility: HOSPITAL | Age: 42
Discharge: HOME | End: 2022-05-04
Attending: INTERNAL MEDICINE | Admitting: INTERNAL MEDICINE
Payer: COMMERCIAL

## 2022-05-04 ENCOUNTER — ANESTHESIA (OUTPATIENT)
Dept: ENDOSCOPY | Facility: HOSPITAL | Age: 42
End: 2022-05-04
Payer: COMMERCIAL

## 2022-05-04 ENCOUNTER — ANESTHESIA EVENT (OUTPATIENT)
Dept: ENDOSCOPY | Facility: HOSPITAL | Age: 42
End: 2022-05-04
Payer: COMMERCIAL

## 2022-05-04 VITALS
DIASTOLIC BLOOD PRESSURE: 81 MMHG | HEART RATE: 86 BPM | HEIGHT: 68 IN | WEIGHT: 260 LBS | BODY MASS INDEX: 39.4 KG/M2 | SYSTOLIC BLOOD PRESSURE: 126 MMHG | RESPIRATION RATE: 16 BRPM | OXYGEN SATURATION: 97 % | TEMPERATURE: 97 F

## 2022-05-04 DIAGNOSIS — K21.9 GASTROESOPHAGEAL REFLUX DISEASE, UNSPECIFIED WHETHER ESOPHAGITIS PRESENT: ICD-10-CM

## 2022-05-04 LAB — GLUCOSE SERPL-MCNC: 104 MG/DL (ref 70–110)

## 2022-05-04 PROCEDURE — 63600175 PHARM REV CODE 636 W HCPCS: Mod: PO | Performed by: INTERNAL MEDICINE

## 2022-05-04 PROCEDURE — 43239 PR EGD, FLEX, W/BIOPSY, SGL/MULTI: ICD-10-PCS | Mod: 59,,, | Performed by: INTERNAL MEDICINE

## 2022-05-04 PROCEDURE — D9220A PRA ANESTHESIA: Mod: CRNA,,, | Performed by: NURSE ANESTHETIST, CERTIFIED REGISTERED

## 2022-05-04 PROCEDURE — D9220A PRA ANESTHESIA: ICD-10-PCS | Mod: CRNA,,, | Performed by: NURSE ANESTHETIST, CERTIFIED REGISTERED

## 2022-05-04 PROCEDURE — 43248 EGD GUIDE WIRE INSERTION: CPT | Mod: PO | Performed by: INTERNAL MEDICINE

## 2022-05-04 PROCEDURE — 63600175 PHARM REV CODE 636 W HCPCS: Mod: PO | Performed by: NURSE ANESTHETIST, CERTIFIED REGISTERED

## 2022-05-04 PROCEDURE — 25000003 PHARM REV CODE 250: Mod: PO | Performed by: NURSE ANESTHETIST, CERTIFIED REGISTERED

## 2022-05-04 PROCEDURE — 43248 EGD GUIDE WIRE INSERTION: CPT | Mod: ,,, | Performed by: INTERNAL MEDICINE

## 2022-05-04 PROCEDURE — 37000008 HC ANESTHESIA 1ST 15 MINUTES: Mod: PO | Performed by: INTERNAL MEDICINE

## 2022-05-04 PROCEDURE — 43239 EGD BIOPSY SINGLE/MULTIPLE: CPT | Mod: 59,PO | Performed by: INTERNAL MEDICINE

## 2022-05-04 PROCEDURE — 43248 PR EGD, FLEX, W/DILATION OVER GUIDEWIRE: ICD-10-PCS | Mod: ,,, | Performed by: INTERNAL MEDICINE

## 2022-05-04 PROCEDURE — 88305 TISSUE EXAM BY PATHOLOGIST: CPT | Performed by: PATHOLOGY

## 2022-05-04 PROCEDURE — D9220A PRA ANESTHESIA: ICD-10-PCS | Mod: ANES,,, | Performed by: ANESTHESIOLOGY

## 2022-05-04 PROCEDURE — 88342 CHG IMMUNOCYTOCHEMISTRY: ICD-10-PCS | Mod: 26,,, | Performed by: PATHOLOGY

## 2022-05-04 PROCEDURE — 88305 TISSUE EXAM BY PATHOLOGIST: CPT | Mod: 26,,, | Performed by: PATHOLOGY

## 2022-05-04 PROCEDURE — 27201012 HC FORCEPS, HOT/COLD, DISP: Mod: PO | Performed by: INTERNAL MEDICINE

## 2022-05-04 PROCEDURE — 88342 IMHCHEM/IMCYTCHM 1ST ANTB: CPT | Performed by: PATHOLOGY

## 2022-05-04 PROCEDURE — 43239 EGD BIOPSY SINGLE/MULTIPLE: CPT | Mod: 59,,, | Performed by: INTERNAL MEDICINE

## 2022-05-04 PROCEDURE — 37000009 HC ANESTHESIA EA ADD 15 MINS: Mod: PO | Performed by: INTERNAL MEDICINE

## 2022-05-04 PROCEDURE — 88342 IMHCHEM/IMCYTCHM 1ST ANTB: CPT | Mod: 26,,, | Performed by: PATHOLOGY

## 2022-05-04 PROCEDURE — D9220A PRA ANESTHESIA: Mod: ANES,,, | Performed by: ANESTHESIOLOGY

## 2022-05-04 PROCEDURE — 88305 TISSUE EXAM BY PATHOLOGIST: ICD-10-PCS | Mod: 26,,, | Performed by: PATHOLOGY

## 2022-05-04 RX ORDER — FENTANYL CITRATE 50 UG/ML
INJECTION, SOLUTION INTRAMUSCULAR; INTRAVENOUS
Status: DISCONTINUED | OUTPATIENT
Start: 2022-05-04 | End: 2022-05-04

## 2022-05-04 RX ORDER — FAMOTIDINE 40 MG/1
40 TABLET, FILM COATED ORAL NIGHTLY
Qty: 60 TABLET | Refills: 5 | Status: SHIPPED | OUTPATIENT
Start: 2022-05-04 | End: 2023-02-17 | Stop reason: SDUPTHER

## 2022-05-04 RX ORDER — SODIUM CHLORIDE 0.9 % (FLUSH) 0.9 %
10 SYRINGE (ML) INJECTION
Status: DISCONTINUED | OUTPATIENT
Start: 2022-05-04 | End: 2022-05-04 | Stop reason: HOSPADM

## 2022-05-04 RX ORDER — PROPOFOL 10 MG/ML
VIAL (ML) INTRAVENOUS
Status: DISCONTINUED | OUTPATIENT
Start: 2022-05-04 | End: 2022-05-04

## 2022-05-04 RX ORDER — SODIUM CHLORIDE, SODIUM LACTATE, POTASSIUM CHLORIDE, CALCIUM CHLORIDE 600; 310; 30; 20 MG/100ML; MG/100ML; MG/100ML; MG/100ML
INJECTION, SOLUTION INTRAVENOUS CONTINUOUS
Status: DISCONTINUED | OUTPATIENT
Start: 2022-05-04 | End: 2022-05-04 | Stop reason: HOSPADM

## 2022-05-04 RX ORDER — LIDOCAINE HCL/PF 100 MG/5ML
SYRINGE (ML) INTRAVENOUS
Status: DISCONTINUED | OUTPATIENT
Start: 2022-05-04 | End: 2022-05-04

## 2022-05-04 RX ADMIN — FENTANYL CITRATE 50 MCG: 50 INJECTION, SOLUTION INTRAMUSCULAR; INTRAVENOUS at 07:05

## 2022-05-04 RX ADMIN — PROPOFOL 50 MG: 10 INJECTION, EMULSION INTRAVENOUS at 07:05

## 2022-05-04 RX ADMIN — SODIUM CHLORIDE, SODIUM LACTATE, POTASSIUM CHLORIDE, AND CALCIUM CHLORIDE: .6; .31; .03; .02 INJECTION, SOLUTION INTRAVENOUS at 07:05

## 2022-05-04 RX ADMIN — PROPOFOL 100 MG: 10 INJECTION, EMULSION INTRAVENOUS at 07:05

## 2022-05-04 RX ADMIN — LIDOCAINE HYDROCHLORIDE 100 MG: 20 INJECTION, SOLUTION INTRAVENOUS at 07:05

## 2022-05-04 NOTE — PROVATION PATIENT INSTRUCTIONS
Discharge Summary/Instructions after an Endoscopic Procedure  Patient Name: Miri Earl  Patient MRN: [id number]  Patient YOB: 1980  Wednesday, May 4, 2022  Earl Colunga MD  Dear patient,  As a result of recent federal legislation (The Federal Cures Act), you may   receive lab or pathology results from your procedure in your [facility]   account before your physician is able to contact you. Your physician or   their representative will relay the results to you with their   recommendations at their soonest availability.  Thank you,  RESTRICTIONS:  During your procedure today, you received medications for sedation.  These   medications may affect your judgment, balance and coordination.  Therefore,   for 24 hours, you have the following restrictions:   - DO NOT drive a car, operate machinery, make legal/financial decisions,   sign important papers or drink alcohol.    ACTIVITY:  Today: no heavy lifting, straining or running due to procedural   sedation/anesthesia.  The following day: return to full activity including work.  DIET:  Eat and drink normally unless instructed otherwise.     TREATMENT FOR COMMON SIDE EFFECTS:  - Mild abdominal pain, nausea, belching, bloating or excessive gas:  rest,   eat lightly and use a heating pad.  - Sore Throat: treat with throat lozenges and/or gargle with warm salt   water.  - Because air was used during the procedure, expelling large amounts of air   from your rectum or belching is normal.  - If a bowel prep was taken, you may not have a bowel movement for 1-3 days.    This is normal.  SYMPTOMS TO WATCH FOR AND REPORT TO YOUR PHYSICIAN:  1. Abdominal pain or bloating, other than gas cramps.  2. Chest pain.  3. Back pain.  4. Signs of infection such as: chills or fever occurring within 24 hours   after the procedure.  5. Rectal bleeding, which would show as bright red, maroon, or black stools.   (A tablespoon of blood from the rectum is not serious, especially  if   hemorrhoids are present.)  6. Vomiting.  7. Weakness or dizziness.  GO DIRECTLY TO THE NEAREST EMERGENCY ROOM IF YOU HAVE ANY OF THE FOLLOWING:      Difficulty breathing              Chills and/or fever over 101 F   Persistent vomiting and/or vomiting blood   Severe abdominal pain   Severe chest pain   Black, tarry stools   Bleeding- more than one tablespoon   Any other symptom or condition that you feel may need urgent attention  Your doctor recommends these additional instructions:  If any biopsies were taken, your doctors clinic will contact you in 1 to 2   weeks with any results.  You are being discharged to home.   Follow an antireflux regimen.  This includes:       - Do not lie down for at least 3 to 4 hours after meals.        - Raise the head of the bed 4 to 6 inches.        - Decrease excess weight.        - Avoid citrus juices and other acidic foods, alcohol, chocolate, mints,   coffee and other caffeinated beverages, carbonated beverages, fatty and   fried foods.        - Avoid tight-fitting clothing.        - Avoid cigarettes and other tobacco products.   Especially:  Exercise and lose weight.  Continue your present medications.   Take Dexilant (dexlansoprazole) 60 mg by mouth once a day.   Take Pepcid (famotidine) 40 mg by mouth at night.   For questions, problems or results please call your physician - Earl Colunga MD at Work:  [phone].  EMERGENCY PHONE NUMBER: [phone], LAB RESULTS: [phone]  IF A COMPLICATION OR EMERGENCY SITUATION ARISES AND YOU ARE UNABLE TO REACH   YOUR PHYSICIAN - GO DIRECTLY TO THE EMERGENCY ROOM.  ___________________________________________  Nurse Signature  ___________________________________________  Patient/Designated Responsible Party Signature  Earl Colunga MD  5/4/2022 8:16:55 AM  This report has been verified and signed electronically.  Dear patient,  As a result of recent federal legislation (The Federal Cures Act), you may    receive lab or pathology results from your procedure in your [facility]   account before your physician is able to contact you. Your physician or   their representative will relay the results to you with their   recommendations at their soonest availability.  Thank you.  [employer]ATION

## 2022-05-04 NOTE — ANESTHESIA POSTPROCEDURE EVALUATION
Anesthesia Post Evaluation    Patient: April D Earl    Procedure(s) Performed: Procedure(s) (LRB):  EGD (ESOPHAGOGASTRODUODENOSCOPY) (N/A)    Final Anesthesia Type: general      Patient location during evaluation: PACU  Patient participation: Yes- Able to Participate  Level of consciousness: awake and alert and oriented  Post-procedure vital signs: reviewed and stable  Pain management: adequate  Airway patency: patent    PONV status at discharge: No PONV  Anesthetic complications: no      Cardiovascular status: blood pressure returned to baseline  Respiratory status: unassisted, spontaneous ventilation and room air  Hydration status: euvolemic  Follow-up not needed.          Vitals Value Taken Time   /69 05/04/22 0814   Temp 36.3 °C (97.3 °F) 05/04/22 0800   Pulse 88 05/04/22 0814   Resp 15 05/04/22 0814   SpO2 96 % 05/04/22 0814         No case tracking events are documented in the log.      Pain/Martha Score: Martha Score: 10 (5/4/2022  8:15 AM)

## 2022-05-04 NOTE — PATIENT INSTRUCTIONS
Recovery After Procedural Sedation (Adult)   You have been given medicine by vein to make you sleep during your surgery. This may have included both a pain medicine and sleeping medicine. Most of the effects have worn off. But you may still have some drowsiness for the next 6 to 8 hours.  Home care  Follow these guidelines when you get home:  For the next 8 hours, you should be watched by a responsible adult. This person should make sure your condition is not getting worse.  Don't drink any alcohol for the next 24 hours.  Don't drive, operate dangerous machinery, or make important business or personal decisions during the next 24 hours.  To prevent injury or falls, use caution when standing and walking for at least 24 hours after your procedure.  Note: Your healthcare provider may tell you not to take any medicine by mouth for pain or sleep in the next 4 hours. These medicines may react with the medicines you were given in the hospital. This could cause a much stronger response than usual.  Follow-up care  Follow up with your healthcare provider if you are not alert and back to your usual level of activity within 12 hours.  When to seek medical advice  Call your healthcare provider right away if any of these occur:  Drowsiness gets worse  Weakness or dizziness gets worse  Repeated vomiting  You can't be awakened  Fever  New rash  [employer] last reviewed this educational content on 9/1/2019  © 6359-2834 [email or web address]. [address] [place] [address], [place] [address]. All rights reserved. This information is not intended as a substitute for professional medical care. Always follow your healthcare professional's instructions       Tips to Control Acid Reflux    To control acid reflux, youll need to make some basic diet and lifestyle changes. The simple steps outlined below may be all youll need to ease discomfort.  Watch what you eat  Avoid fatty foods and spicy foods.  Eat fewer acidic foods, such as citrus and  tomato-based foods. These can increase symptoms.  Limit drinking alcohol, caffeine, and fizzy beverages. All increase acid reflux.  Try limiting chocolate, peppermint, and spearmint. These can worsen acid reflux in some people.  Watch when you eat  Avoid lying down for 3 hours after eating.  Do not snack before going to bed.  Raise your head  Raising your head and upper body by 4 to 6 inches helps limit reflux when youre lying down. Put blocks under the head of your bed frame to raise it.  Other changes  Lose weight, if you need to  Dont exercise near bedtime  Avoid tight-fitting clothes  Limit aspirin and ibuprofen  Stop smoking   Date Last Reviewed: 7/1/2016  © 1963-3325 [employer]. [address] [place] [address], [place] [address]. All rights reserved. This information is not intended as a substitute for professional medical care. Always follow your healthcare professional's instructions.

## 2022-05-04 NOTE — TRANSFER OF CARE
"Anesthesia Transfer of Care Note    Patient: April D Rajat    Procedure(s) Performed: Procedure(s) (LRB):  EGD (ESOPHAGOGASTRODUODENOSCOPY) (N/A)    Patient location: PACU    Anesthesia Type: general    Transport from OR: Transported from OR on 2-3 L/min O2 by NC with adequate spontaneous ventilation    Post pain: adequate analgesia    Post assessment: no apparent anesthetic complications and tolerated procedure well    Post vital signs: stable    Level of consciousness: responds to stimulation    Nausea/Vomiting: no nausea/vomiting    Complications: none    Transfer of care protocol was followed      Last vitals:   Visit Vitals  BP (!) 110/56   Pulse 93   Temp 36.4 °C (97.5 °F) (Skin)   Resp 18   Ht 5' 8" (1.727 m)   Wt 117.9 kg (260 lb)   SpO2 98%   Breastfeeding No   BMI 39.53 kg/m²     "

## 2022-05-04 NOTE — BRIEF OP NOTE
Discharge Note  Short Stay      SUMMARY     Admit Date: 5/4/2022    Attending Physician: Earl Colunga Jr., MD     Discharge Physician: Earl Colunga Jr., MD    Discharge Date: 5/4/2022 8:20 AM    Final Diagnosis: Heartburn [R12]  Nausea [R11.0]  Hepatic steatosis [K76.0]    Impression:            - Normal oropharynx.                          - Normal cricopharyngeus.                          - Normal esophagus.                          - Z-line regular, 39 cm from the incisors.                          - Patulous lower esophageal sphincter.                          - No endoscopic esophageal abnormality to explain                          patient's dysphagia. Esophagus dilated, 54 Fr.                          - Normal cardia.                          - Slight antritis. Biopsied.                          - Normal stomach otherwise.                          - Normal pylorus.                          - Normal examined duodenum.                          - Normal major papilla.   Recommendation:        - Discharge patient to home.                          - Follow an antireflux regimen.                          - Exercise and weight loss.                          - Continue present medications.                          - Use Dexilant (dexlansoprazole) 60 mg PO daily.                          - Use Pepcid (famotidine) 40 mg PO nightly.                          .   Earl Colunga MD   5/4/2022    Disposition: HOME OR SELF CARE    Patient Instructions:   Current Discharge Medication List      CONTINUE these medications which have CHANGED    Details   famotidine (PEPCID) 40 MG tablet Take 1 tablet (40 mg total) by mouth every evening.  Qty: 60 tablet, Refills: 5         CONTINUE these medications which have NOT CHANGED    Details   albuterol (PROVENTIL/VENTOLIN HFA) 90 mcg/actuation inhaler USE 2 PUFFS EVERY 6 HOURS AS NEEDED FOR WHEEZING      atorvastatin (LIPITOR) 10 MG tablet Take 1 tablet (10 mg total) by  [Adequate] : adequate mouth every evening.  Qty: 30 tablet, Refills: 6    Associated Diagnoses: Mixed hyperlipidemia      budesonide (PULMICORT) 0.5 mg/2 mL nebulizer solution USE 2 ML(0.5 MG) VIA NEBULIZER TWICE DAILY AS NEEDED  Qty: 60 mL, Refills: 1    Associated Diagnoses: Mild intermittent asthma without complication      calcium carbonate-magnesium hydroxide (ROLAIDS) 550-110 mg Chew Take 1 tablet by mouth 2 (two) times daily with meals.      cetirizine (ZYRTEC) 10 MG tablet TAKE 1 TABLET BY MOUTH EVERY DAY  Qty: 90 tablet, Refills: 1    Associated Diagnoses: Chronic allergic rhinitis      CRAN-VITC-MANNOSE-FOS-BROMELN ORAL Take by mouth.      cyanocobalamin 1,000 mcg/mL injection INJECT 1 ML IN THE MUSCLE EVERY 30 DAYS  Qty: 10 mL, Refills: 1    Associated Diagnoses: Anemia due to vitamin B12 deficiency, unspecified B12 deficiency type      dexlansoprazole (DEXILANT) 60 mg capsule Take 1 capsule (60 mg total) by mouth before breakfast.  Qty: 30 capsule, Refills: 11    Associated Diagnoses: Heartburn      gabapentin (NEURONTIN) 600 MG tablet TAKE 2 TABLETS(1200 MG) BY MOUTH THREE TIMES DAILY  Qty: 540 tablet, Refills: 1      ketorolac (TORADOL) 10 mg tablet Take by mouth daily as needed. TAKE 1 TABLET BY MOUTH EVERY 6 HOURS FOR 5 DAYS      levalbuterol (XOPENEX) 1.25 mg/3 mL nebulizer solution Take 3 mLs (1.25 mg total) by nebulization every 4 (four) hours as needed for Wheezing. Rescue  Qty: 30 mL, Refills: 1      linaGLIPtin (TRADJENTA) 5 mg Tab tablet Take 1 tablet (5 mg total) by mouth every evening.  Qty: 30 tablet, Refills: 6    Associated Diagnoses: Type 2 diabetes mellitus without complication, without long-term current use of insulin      lisinopriL (PRINIVIL,ZESTRIL) 2.5 MG tablet TAKE 1 TABLET(2.5 MG) BY MOUTH EVERY EVENING  Qty: 90 tablet, Refills: 1    Associated Diagnoses: Essential hypertension; Type 2 diabetes mellitus without complication, without long-term current use of insulin      metoprolol succinate  (TOPROL-XL) 50 MG 24 hr tablet Take 1 tablet (50 mg total) by mouth every evening.  Qty: 90 tablet, Refills: 1    Comments: .  Associated Diagnoses: Essential hypertension      montelukast (SINGULAIR) 10 mg tablet TAKE 1 TABLET(10 MG) BY MOUTH EVERY EVENING  Qty: 30 tablet, Refills: 1    Associated Diagnoses: Mild intermittent asthma without complication      morphine 100 mg/100 mL (1 mg/mL) infusion 3.6 mcg/kg/hr continuous. Intrathecal administration per pain pump      ondansetron (ZOFRAN) 8 MG tablet Take 1 tablet (8 mg total) by mouth every 8 (eight) hours.  Qty: 6 tablet, Refills: 1    Associated Diagnoses: Colitis; Irritable bowel syndrome, unspecified type      oxyCODONE (ROXICODONE) 10 mg Tab immediate release tablet TAKE 1 TABLET BY MOUTH EVERY 6 HOURS FOR 3 DAYS      potassium chloride SA (K-DUR,KLOR-CON) 20 MEQ tablet TAKE 1 TABLET(20 MEQ) BY MOUTH EVERY EVENING  Qty: 30 tablet, Refills: 0    Comments: Needs appointment before any further refills.      promethazine (PHENERGAN) 25 MG tablet Take 25 mg by mouth every 8 (eight) hours as needed.      sodium chloride 0.9% 0.9 % SolP with bupivacaine 0.5% (5 mg/ml) 0.5 % (5 mg/mL) Soln 0.1 % by Epidural route continuous.      diazePAM (VALIUM) 5 MG tablet TAKE 1 TABLET BY MOUTH EVERY 8 HOURS FOR 5 DAYS AS NEEDED FOR SEVERE SPASMS. DO NOT TAKE WITHIN 2 HOURS OF OPIODS      EPINEPHrine (EPIPEN) 0.3 mg/0.3 mL AtIn as needed.      naldemedine (SYMPROIC) 0.2 mg Tab Take 0.2 mg by mouth once daily.  Qty: 30 tablet, Refills: 3    Associated Diagnoses: Constipation due to opioid therapy             Discharge Procedure Orders (must include Diet, Follow-up, Activity)    Follow Up:  Follow up with PCP as per your routine.  Please follow an anti reflux diet and a high fiber diet.  Activity as tolerated.    No driving day of procedure.

## 2022-05-04 NOTE — ANESTHESIA PREPROCEDURE EVALUATION
05/04/2022  Miri Earl is a 41 y.o., female.    Pre-op Assessment    I have reviewed the Patient Summary Reports.    I have reviewed the Nursing Notes. I have reviewed the NPO Status.   I have reviewed the Medications.     Review of Systems  Anesthesia Hx:  No problems with previous Anesthesia    Social:  Non-Smoker    Cardiovascular:   Hypertension, well controlled Valvular problems/Murmurs, MVP    Pulmonary:   Asthma asymptomatic and mild Sleep Apnea    Education provided regarding risk of obstructive sleep apnea     Renal/:  Renal/ Normal     Hepatic/GI:   GERD Liver Disease, IBS   Musculoskeletal:   Arthritis     Neurological:   Neuromuscular Disease, Chronic back pain  Peripheral Neuropathy    Endocrine:   Diabetes, well controlled, type 2  Morbid Obesity / BMI > 40  Psych:   Psychiatric History anxiety depression          Physical Exam  General:  Well nourished and Morbid Obesity      Airway/Jaw/Neck:  Airway Findings: Mouth Opening: Normal   Tongue: Normal   General Airway Assessment: Adult Oropharynx Findings:  Mallampati: II  Jaw/Neck Findings:  Neck ROM: Normal ROM      Eyes/Ears/Nose:  Eyes/Ears/Nose Findings:    Dental:  Dental Findings:   Chest/Lungs:  Chest/Lungs Findings: Normal Respiratory Rate      Heart/Vascular:  Heart Findings: Rate: Normal  Rhythm: Regular Rhythm        Mental Status:  Mental Status Findings:  Cooperative, Alert and Oriented         Anesthesia Plan  Type of Anesthesia, risks & benefits discussed:  Anesthesia Type:  general    Patient's Preference:   Plan Factors:          Intra-op Monitoring Plan: standard ASA monitors  Intra-op Monitoring Plan Comments:   Post Op Pain Control Plan: multimodal analgesia  Post Op Pain Control Plan Comments:     Induction:   IV  Beta Blocker:  Patient is on a Beta-Blocker and has received one dose within the past 24 hours (No further  documentation required).       Informed Consent: Informed consent signed with the Patient and all parties understand the risks and agree with anesthesia plan.  All questions answered.  Anesthesia consent signed with patient.  ASA Score: 3     Day of Surgery Review of History & Physical:              Ready For Surgery From Anesthesia Perspective.           Physical Exam  General: Well nourished and Morbid Obesity    Airway:  Mallampati: II   Mouth Opening: Normal  Tongue: Normal  Neck ROM: Normal ROM    Chest/Lungs:  Normal Respiratory Rate    Heart:  Rate: Normal  Rhythm: Regular Rhythm          Anesthesia Plan  Type of Anesthesia, risks & benefits discussed:    Anesthesia Type: general  Intra-op Monitoring Plan: standard ASA monitors  Post Op Pain Control Plan: multimodal analgesia  Induction:  IV  Informed Consent: Informed consent signed with the Patient and all parties understand the risks and agree with anesthesia plan.  All questions answered.   ASA Score: 3    Ready For Surgery From Anesthesia Perspective.       .

## 2022-05-10 ENCOUNTER — TELEPHONE (OUTPATIENT)
Dept: GASTROENTEROLOGY | Facility: CLINIC | Age: 42
End: 2022-05-10
Payer: COMMERCIAL

## 2022-05-10 NOTE — TELEPHONE ENCOUNTER
----- Message from Larisa Levy sent at 5/10/2022  9:31 AM CDT -----  Regarding: appointment  Contact: patient  Type:  Sooner Appointment Request    Caller is requesting a sooner appointment.  Caller declined first available appointment listed below.  Caller will not accept being placed on the waitlist and is requesting a message be sent to doctor.    Name of Caller:  patient  When is the first available appointment?  06/30/22  Symptoms:  f/u results of EDG from 05/04/22  Best Call Back Number:  [phone] (home)   Additional Information:  Patient was told to f/u in 6 to 6 weeks. Please call patient to advise.Thanks!

## 2022-05-10 NOTE — TELEPHONE ENCOUNTER
Returned call to the patient, patient requested to be seen in clinic stating that Dr. Ayala told her to be seen in 6-8 weeks after her procedure, an appointment was offered to the patein and patient declined stating that the appointment was too far off, patient insisted to be seen by NP Susan Navarro Np stating that she has seen Susan Navarro NP in the past, an appointment was set up per patient's request.

## 2022-05-12 LAB
FINAL PATHOLOGIC DIAGNOSIS: NORMAL
Lab: NORMAL

## 2022-05-19 ENCOUNTER — OFFICE VISIT (OUTPATIENT)
Dept: FAMILY MEDICINE | Facility: CLINIC | Age: 42
End: 2022-05-19
Payer: COMMERCIAL

## 2022-05-19 VITALS
SYSTOLIC BLOOD PRESSURE: 128 MMHG | DIASTOLIC BLOOD PRESSURE: 88 MMHG | RESPIRATION RATE: 20 BRPM | BODY MASS INDEX: 39.88 KG/M2 | WEIGHT: 263.13 LBS | TEMPERATURE: 98 F | HEIGHT: 68 IN | OXYGEN SATURATION: 97 % | HEART RATE: 94 BPM

## 2022-05-19 DIAGNOSIS — I10 ESSENTIAL HYPERTENSION: ICD-10-CM

## 2022-05-19 DIAGNOSIS — J45.20 MILD INTERMITTENT ASTHMA WITHOUT COMPLICATION: Primary | ICD-10-CM

## 2022-05-19 DIAGNOSIS — Z12.39 ENCOUNTER FOR SCREENING FOR MALIGNANT NEOPLASM OF BREAST, UNSPECIFIED SCREENING MODALITY: ICD-10-CM

## 2022-05-19 DIAGNOSIS — J30.9 CHRONIC ALLERGIC RHINITIS: ICD-10-CM

## 2022-05-19 DIAGNOSIS — E66.01 CLASS 3 SEVERE OBESITY IN ADULT, UNSPECIFIED BMI, UNSPECIFIED OBESITY TYPE, UNSPECIFIED WHETHER SERIOUS COMORBIDITY PRESENT: ICD-10-CM

## 2022-05-19 DIAGNOSIS — E11.9 TYPE 2 DIABETES MELLITUS WITHOUT COMPLICATION, WITHOUT LONG-TERM CURRENT USE OF INSULIN: ICD-10-CM

## 2022-05-19 DIAGNOSIS — G47.33 OSA (OBSTRUCTIVE SLEEP APNEA): ICD-10-CM

## 2022-05-19 DIAGNOSIS — K21.9 GERD WITHOUT ESOPHAGITIS: ICD-10-CM

## 2022-05-19 PROCEDURE — 4010F ACE/ARB THERAPY RXD/TAKEN: CPT | Mod: CPTII,S$GLB,, | Performed by: NURSE PRACTITIONER

## 2022-05-19 PROCEDURE — 3079F DIAST BP 80-89 MM HG: CPT | Mod: CPTII,S$GLB,, | Performed by: NURSE PRACTITIONER

## 2022-05-19 PROCEDURE — 3008F BODY MASS INDEX DOCD: CPT | Mod: CPTII,S$GLB,, | Performed by: NURSE PRACTITIONER

## 2022-05-19 PROCEDURE — 1159F MED LIST DOCD IN RCRD: CPT | Mod: CPTII,S$GLB,, | Performed by: NURSE PRACTITIONER

## 2022-05-19 PROCEDURE — 3074F PR MOST RECENT SYSTOLIC BLOOD PRESSURE < 130 MM HG: ICD-10-PCS | Mod: CPTII,S$GLB,, | Performed by: NURSE PRACTITIONER

## 2022-05-19 PROCEDURE — 1159F PR MEDICATION LIST DOCUMENTED IN MEDICAL RECORD: ICD-10-PCS | Mod: CPTII,S$GLB,, | Performed by: NURSE PRACTITIONER

## 2022-05-19 PROCEDURE — 3074F SYST BP LT 130 MM HG: CPT | Mod: CPTII,S$GLB,, | Performed by: NURSE PRACTITIONER

## 2022-05-19 PROCEDURE — 4010F PR ACE/ARB THEARPY RXD/TAKEN: ICD-10-PCS | Mod: CPTII,S$GLB,, | Performed by: NURSE PRACTITIONER

## 2022-05-19 PROCEDURE — 3008F PR BODY MASS INDEX (BMI) DOCUMENTED: ICD-10-PCS | Mod: CPTII,S$GLB,, | Performed by: NURSE PRACTITIONER

## 2022-05-19 PROCEDURE — 3079F PR MOST RECENT DIASTOLIC BLOOD PRESSURE 80-89 MM HG: ICD-10-PCS | Mod: CPTII,S$GLB,, | Performed by: NURSE PRACTITIONER

## 2022-05-19 PROCEDURE — 99204 PR OFFICE/OUTPT VISIT, NEW, LEVL IV, 45-59 MIN: ICD-10-PCS | Mod: S$GLB,,, | Performed by: NURSE PRACTITIONER

## 2022-05-19 PROCEDURE — 99204 OFFICE O/P NEW MOD 45 MIN: CPT | Mod: S$GLB,,, | Performed by: NURSE PRACTITIONER

## 2022-05-19 RX ORDER — MONTELUKAST SODIUM 10 MG/1
10 TABLET ORAL NIGHTLY
Qty: 90 TABLET | Refills: 3 | Status: SHIPPED | OUTPATIENT
Start: 2022-05-19 | End: 2023-04-17

## 2022-05-19 RX ORDER — ALBUTEROL SULFATE 90 UG/1
AEROSOL, METERED RESPIRATORY (INHALATION)
Qty: 18 G | Refills: 1 | Status: SHIPPED | OUTPATIENT
Start: 2022-05-19 | End: 2023-08-27 | Stop reason: SDUPTHER

## 2022-05-19 RX ORDER — LISINOPRIL 2.5 MG/1
2.5 TABLET ORAL DAILY
Qty: 90 TABLET | Refills: 3 | Status: SHIPPED | OUTPATIENT
Start: 2022-05-19 | End: 2023-04-17

## 2022-05-19 RX ORDER — POTASSIUM CHLORIDE 20 MEQ/1
TABLET, EXTENDED RELEASE ORAL
Qty: 90 TABLET | Refills: 3 | Status: SHIPPED | OUTPATIENT
Start: 2022-05-19 | End: 2023-01-05

## 2022-05-19 RX ORDER — METOPROLOL SUCCINATE 50 MG/1
50 TABLET, EXTENDED RELEASE ORAL NIGHTLY
Qty: 90 TABLET | Refills: 3 | Status: SHIPPED | OUTPATIENT
Start: 2022-05-19 | End: 2023-04-17

## 2022-05-19 RX ORDER — CETIRIZINE HYDROCHLORIDE 10 MG/1
10 TABLET ORAL DAILY
Qty: 90 TABLET | Refills: 3 | Status: SHIPPED | OUTPATIENT
Start: 2022-05-19 | End: 2023-04-17

## 2022-05-19 NOTE — PROGRESS NOTES
"Subjective:       Patient ID: April D Rajat is a 41 y.o. female.    Chief Complaint: Establish Care  The pt is here to establish care. She has the following active problems.   1. DM-on tradjenta, lipitor, lisinopril,   2. GERD-stable on dexilant/pepcid.   3. DDD-lumbar, thoracic and cervical-Dr. Edouard @ [facility]. On gabapentin.  4. HERNANDEZ-followed by hepatology.  5. REUBEN-stable on CPAP  6. HTN-on toprol/low dose lisinopril.  7. Asthma-stable, on singulair  8. Goiter-thyroid uptake and scan 12/7/21 normal.  Hx Tanvi    HPI  Review of Systems   Constitutional: Negative for activity change and appetite change.   HENT: Negative for congestion, postnasal drip, rhinorrhea and sinus pressure.    Eyes: Negative for pain and redness.   Respiratory: Negative for choking and chest tightness.    Gastrointestinal: Negative for abdominal distention, abdominal pain, blood in stool, constipation, diarrhea, nausea and vomiting.   Endocrine: Negative for polydipsia and polyphagia.   Genitourinary: Negative for dysuria and hematuria.   Musculoskeletal: Negative for arthralgias and myalgias.   Skin: Negative for color change and rash.   Neurological: Negative for dizziness and headaches.   Psychiatric/Behavioral: Negative for agitation and behavioral problems.       Past medical, surgical, family and social history reviewed.  Objective:     Vitals:    05/19/22 0947   BP: 128/88   Pulse: 94   Resp: 20   Temp: 97.8 °F (36.6 °C)   SpO2: 97%   Weight: 119.3 kg (263 lb 1.9 oz)   Height: 5' 8" (1.727 m)   PainSc: 0-No pain     Body mass index is 40.01 kg/m².     Physical Exam  Constitutional:       General: She is not in acute distress.     Appearance: She is well-developed. She is not diaphoretic.   HENT:      Head: Normocephalic and atraumatic.      Right Ear: Hearing, tympanic membrane, ear canal and external ear normal.      Left Ear: Hearing, tympanic membrane, ear canal and external ear normal.      Nose: Nose normal.      Mouth/Throat:    "   Pharynx: Uvula midline.   Eyes:      General:         Right eye: No discharge.         Left eye: No discharge.      Conjunctiva/sclera: Conjunctivae normal.      Pupils: Pupils are equal, round, and reactive to light.   Neck:      Thyroid: No thyromegaly.      Vascular: No carotid bruit or JVD.      Trachea: Trachea normal.   Cardiovascular:      Rate and Rhythm: Normal rate and regular rhythm.      Heart sounds: No murmur heard.    No friction rub. No gallop.   Pulmonary:      Effort: Pulmonary effort is normal. No respiratory distress.      Breath sounds: Normal breath sounds. No wheezing or rales.   Chest:      Chest wall: No tenderness.   Abdominal:      General: Bowel sounds are normal. There is no distension.      Palpations: Abdomen is soft. There is no mass.      Tenderness: There is no abdominal tenderness. There is no guarding or rebound.   Musculoskeletal:         General: Normal range of motion.      Cervical back: Normal range of motion and neck supple.   Skin:     General: Skin is warm and dry.   Neurological:      Mental Status: She is alert and oriented to person, place, and time.      Coordination: Coordination normal.   Psychiatric:         Behavior: Behavior normal.         Thought Content: Thought content normal.         Judgment: Judgment normal.         Assessment:       1. Mild intermittent asthma without complication    2. Chronic allergic rhinitis    3. Essential hypertension    4. Type 2 diabetes mellitus without complication, without long-term current use of insulin    5. Encounter for screening for malignant neoplasm of breast, unspecified screening modality    6. GERD without esophagitis    7. Class 3 severe obesity in adult, unspecified BMI, unspecified obesity type, unspecified whether serious comorbidity present    8. REUBEN (obstructive sleep apnea)        Plan:       April was seen today for \[facility] [place] [facility]\"" care.    Diagnoses and all orders for this visit:    Mild intermittent asthma  without complication  -     montelukast (SINGULAIR) 10 mg tablet; Take 1 tablet (10 mg total) by mouth every evening.    Chronic allergic rhinitis  -     cetirizine (ZYRTEC) 10 MG tablet; Take 1 tablet (10 mg total) by mouth once daily.    Essential hypertension  -     metoprolol succinate (TOPROL-XL) 50 MG 24 hr tablet; Take 1 tablet (50 mg total) by mouth every evening.  -     lisinopriL (PRINIVIL,ZESTRIL) 2.5 MG tablet; Take 1 tablet (2.5 mg total) by mouth once daily.  -     CBC Auto Differential; Future  -     Comprehensive Metabolic Panel; Future  -     Lipid Panel; Future  -     TSH; Future    Type 2 diabetes mellitus without complication, without long-term current use of insulin  -     lisinopriL (PRINIVIL,ZESTRIL) 2.5 MG tablet; Take 1 tablet (2.5 mg total) by mouth once daily.  -     Hemoglobin A1C; Future  -     Microalbumin/Creatinine Ratio, Urine; Future  -     Ambulatory referral/consult to Optometry; Future    Encounter for screening for malignant neoplasm of breast, unspecified screening modality  -     Cancel: Mammo Digital Screening Bilat; Future  -     Cancel: Mammo Digital Screening Bilat w/ Inder; Future    GERD without esophagitis  Stable. Continue current medications.      Class 3 severe obesity in adult, unspecified BMI, unspecified obesity type, unspecified whether serious comorbidity present  Weight loss discussed    REUBEN (obstructive sleep apnea)  Continue CPAP    Other orders  -     potassium chloride SA (K-DUR,KLOR-CON) 20 MEQ tablet; TAKE 1 TABLET(20 MEQ) BY MOUTH EVERY EVENING  -     albuterol (PROVENTIL/VENTOLIN HFA) 90 mcg/actuation inhaler; USE 2 PUFFS EVERY 6 HOURS AS NEEDED FOR WHEEZING

## 2022-05-31 ENCOUNTER — PATIENT MESSAGE (OUTPATIENT)
Dept: ADMINISTRATIVE | Facility: HOSPITAL | Age: 42
End: 2022-05-31
Payer: COMMERCIAL

## 2022-06-01 ENCOUNTER — TELEPHONE (OUTPATIENT)
Dept: GASTROENTEROLOGY | Facility: CLINIC | Age: 42
End: 2022-06-01
Payer: COMMERCIAL

## 2022-06-01 ENCOUNTER — LAB VISIT (OUTPATIENT)
Dept: LAB | Facility: HOSPITAL | Age: 42
End: 2022-06-01
Attending: NURSE PRACTITIONER
Payer: COMMERCIAL

## 2022-06-01 DIAGNOSIS — E11.9 TYPE 2 DIABETES MELLITUS WITHOUT COMPLICATION, WITHOUT LONG-TERM CURRENT USE OF INSULIN: ICD-10-CM

## 2022-06-01 DIAGNOSIS — I10 ESSENTIAL HYPERTENSION: ICD-10-CM

## 2022-06-01 DIAGNOSIS — E04.1 NODULAR THYROID DISEASE: ICD-10-CM

## 2022-06-01 LAB
ALBUMIN SERPL BCP-MCNC: 3.5 G/DL (ref 3.5–5.2)
ALP SERPL-CCNC: 125 U/L (ref 55–135)
ALT SERPL W/O P-5'-P-CCNC: 43 U/L (ref 10–44)
ANION GAP SERPL CALC-SCNC: 11 MMOL/L (ref 8–16)
AST SERPL-CCNC: 24 U/L (ref 10–40)
BASOPHILS # BLD AUTO: 0.03 K/UL (ref 0–0.2)
BASOPHILS NFR BLD: 0.3 % (ref 0–1.9)
BILIRUB SERPL-MCNC: 0.6 MG/DL (ref 0.1–1)
BUN SERPL-MCNC: 7 MG/DL (ref 6–20)
CALCIUM SERPL-MCNC: 9.4 MG/DL (ref 8.7–10.5)
CHLORIDE SERPL-SCNC: 104 MMOL/L (ref 95–110)
CHOLEST SERPL-MCNC: 135 MG/DL (ref 120–199)
CHOLEST/HDLC SERPL: 4 {RATIO} (ref 2–5)
CO2 SERPL-SCNC: 25 MMOL/L (ref 23–29)
CREAT SERPL-MCNC: 0.8 MG/DL (ref 0.5–1.4)
DIFFERENTIAL METHOD: ABNORMAL
EOSINOPHIL # BLD AUTO: 0.2 K/UL (ref 0–0.5)
EOSINOPHIL NFR BLD: 1.7 % (ref 0–8)
ERYTHROCYTE [DISTWIDTH] IN BLOOD BY AUTOMATED COUNT: 12.4 % (ref 11.5–14.5)
EST. GFR  (AFRICAN AMERICAN): >60 ML/MIN/1.73 M^2
EST. GFR  (NON AFRICAN AMERICAN): >60 ML/MIN/1.73 M^2
ESTIMATED AVG GLUCOSE: 126 MG/DL (ref 68–131)
GLUCOSE SERPL-MCNC: 109 MG/DL (ref 70–110)
HBA1C MFR BLD: 6 % (ref 4–5.6)
HCT VFR BLD AUTO: 44.2 % (ref 37–48.5)
HDLC SERPL-MCNC: 34 MG/DL (ref 40–75)
HDLC SERPL: 25.2 % (ref 20–50)
HGB BLD-MCNC: 14 G/DL (ref 12–16)
IMM GRANULOCYTES # BLD AUTO: 0.02 K/UL (ref 0–0.04)
IMM GRANULOCYTES NFR BLD AUTO: 0.2 % (ref 0–0.5)
LDLC SERPL CALC-MCNC: 75.8 MG/DL (ref 63–159)
LYMPHOCYTES # BLD AUTO: 1.6 K/UL (ref 1–4.8)
LYMPHOCYTES NFR BLD: 17 % (ref 18–48)
MCH RBC QN AUTO: 29.5 PG (ref 27–31)
MCHC RBC AUTO-ENTMCNC: 31.7 G/DL (ref 32–36)
MCV RBC AUTO: 93 FL (ref 82–98)
MONOCYTES # BLD AUTO: 0.4 K/UL (ref 0.3–1)
MONOCYTES NFR BLD: 4.1 % (ref 4–15)
NEUTROPHILS # BLD AUTO: 7.4 K/UL (ref 1.8–7.7)
NEUTROPHILS NFR BLD: 76.7 % (ref 38–73)
NONHDLC SERPL-MCNC: 101 MG/DL
NRBC BLD-RTO: 0 /100 WBC
PLATELET # BLD AUTO: 315 K/UL (ref 150–450)
PMV BLD AUTO: 10.3 FL (ref 9.2–12.9)
POTASSIUM SERPL-SCNC: 3.6 MMOL/L (ref 3.5–5.1)
PROT SERPL-MCNC: 7 G/DL (ref 6–8.4)
RBC # BLD AUTO: 4.74 M/UL (ref 4–5.4)
SODIUM SERPL-SCNC: 140 MMOL/L (ref 136–145)
T4 FREE SERPL-MCNC: 0.82 NG/DL (ref 0.71–1.51)
THYROGLOB AB SERPL IA-ACNC: <4 IU/ML (ref 0–3.9)
THYROPEROXIDASE IGG SERPL-ACNC: <6 IU/ML
TRIGL SERPL-MCNC: 126 MG/DL (ref 30–150)
TSH SERPL DL<=0.005 MIU/L-ACNC: 0.87 UIU/ML (ref 0.4–4)
TSH SERPL DL<=0.005 MIU/L-ACNC: 0.87 UIU/ML (ref 0.4–4)
WBC # BLD AUTO: 9.64 K/UL (ref 3.9–12.7)

## 2022-06-01 PROCEDURE — 83036 HEMOGLOBIN GLYCOSYLATED A1C: CPT | Performed by: NURSE PRACTITIONER

## 2022-06-01 PROCEDURE — 85025 COMPLETE CBC W/AUTO DIFF WBC: CPT | Performed by: NURSE PRACTITIONER

## 2022-06-01 PROCEDURE — 36415 COLL VENOUS BLD VENIPUNCTURE: CPT | Mod: PO | Performed by: PHYSICIAN ASSISTANT

## 2022-06-01 PROCEDURE — 80061 LIPID PANEL: CPT | Performed by: NURSE PRACTITIONER

## 2022-06-01 PROCEDURE — 84439 ASSAY OF FREE THYROXINE: CPT | Performed by: PHYSICIAN ASSISTANT

## 2022-06-01 PROCEDURE — 84443 ASSAY THYROID STIM HORMONE: CPT | Performed by: PHYSICIAN ASSISTANT

## 2022-06-01 PROCEDURE — 86800 THYROGLOBULIN ANTIBODY: CPT | Performed by: PHYSICIAN ASSISTANT

## 2022-06-01 PROCEDURE — 80053 COMPREHEN METABOLIC PANEL: CPT | Performed by: NURSE PRACTITIONER

## 2022-06-01 PROCEDURE — 86376 MICROSOMAL ANTIBODY EACH: CPT | Performed by: PHYSICIAN ASSISTANT

## 2022-06-10 ENCOUNTER — HOSPITAL ENCOUNTER (OUTPATIENT)
Dept: RADIOLOGY | Facility: HOSPITAL | Age: 42
Discharge: HOME | End: 2022-06-10
Attending: NURSE PRACTITIONER
Payer: COMMERCIAL

## 2022-06-10 DIAGNOSIS — Z12.31 SCREENING MAMMOGRAM, ENCOUNTER FOR: ICD-10-CM

## 2022-06-10 DIAGNOSIS — Z12.39 ENCOUNTER FOR SCREENING FOR MALIGNANT NEOPLASM OF BREAST, UNSPECIFIED SCREENING MODALITY: ICD-10-CM

## 2022-06-10 PROCEDURE — 77063 BREAST TOMOSYNTHESIS BI: CPT | Mod: 26,,, | Performed by: RADIOLOGY

## 2022-06-10 PROCEDURE — 77063 MAMMO DIGITAL SCREENING BILAT WITH TOMO: ICD-10-PCS | Mod: 26,,, | Performed by: RADIOLOGY

## 2022-06-10 PROCEDURE — 77067 SCR MAMMO BI INCL CAD: CPT | Mod: 26,,, | Performed by: RADIOLOGY

## 2022-06-10 PROCEDURE — 77067 MAMMO DIGITAL SCREENING BILAT WITH TOMO: ICD-10-PCS | Mod: 26,,, | Performed by: RADIOLOGY

## 2022-06-10 PROCEDURE — 77067 SCR MAMMO BI INCL CAD: CPT | Mod: TC,PO

## 2022-06-21 ENCOUNTER — OFFICE VISIT (OUTPATIENT)
Dept: GASTROENTEROLOGY | Facility: CLINIC | Age: 42
End: 2022-06-21
Payer: COMMERCIAL

## 2022-06-21 VITALS — WEIGHT: 263 LBS | HEIGHT: 68 IN | BODY MASS INDEX: 39.86 KG/M2

## 2022-06-21 DIAGNOSIS — Z98.890 HISTORY OF ESOPHAGOGASTRODUODENOSCOPY (EGD): Primary | ICD-10-CM

## 2022-06-21 DIAGNOSIS — Z87.19 HISTORY OF GASTRITIS: ICD-10-CM

## 2022-06-21 DIAGNOSIS — Z87.19 HISTORY OF HEMORRHOIDS: ICD-10-CM

## 2022-06-21 DIAGNOSIS — R16.0 HEPATOMEGALY: ICD-10-CM

## 2022-06-21 DIAGNOSIS — K31.A0 INTESTINAL METAPLASIA OF GASTRIC MUCOSA: ICD-10-CM

## 2022-06-21 DIAGNOSIS — K76.0 HEPATIC STEATOSIS: ICD-10-CM

## 2022-06-21 DIAGNOSIS — R11.0 NAUSEA: ICD-10-CM

## 2022-06-21 DIAGNOSIS — K92.1 HEMATOCHEZIA: ICD-10-CM

## 2022-06-21 DIAGNOSIS — R19.7 INTERMITTENT DIARRHEA: ICD-10-CM

## 2022-06-21 DIAGNOSIS — Z87.19 HISTORY OF CONSTIPATION: ICD-10-CM

## 2022-06-21 DIAGNOSIS — K62.89 RECTAL PAIN: ICD-10-CM

## 2022-06-21 PROCEDURE — 3066F PR DOCUMENTATION OF TREATMENT FOR NEPHROPATHY: ICD-10-PCS | Mod: CPTII,95,, | Performed by: NURSE PRACTITIONER

## 2022-06-21 PROCEDURE — 99214 OFFICE O/P EST MOD 30 MIN: CPT | Mod: 95,,, | Performed by: NURSE PRACTITIONER

## 2022-06-21 PROCEDURE — 3061F NEG MICROALBUMINURIA REV: CPT | Mod: CPTII,95,, | Performed by: NURSE PRACTITIONER

## 2022-06-21 PROCEDURE — 3044F HG A1C LEVEL LT 7.0%: CPT | Mod: CPTII,95,, | Performed by: NURSE PRACTITIONER

## 2022-06-21 PROCEDURE — 3008F BODY MASS INDEX DOCD: CPT | Mod: CPTII,95,, | Performed by: NURSE PRACTITIONER

## 2022-06-21 PROCEDURE — 3061F PR NEG MICROALBUMINURIA RESULT DOCUMENTED/REVIEW: ICD-10-PCS | Mod: CPTII,95,, | Performed by: NURSE PRACTITIONER

## 2022-06-21 PROCEDURE — 3008F PR BODY MASS INDEX (BMI) DOCUMENTED: ICD-10-PCS | Mod: CPTII,95,, | Performed by: NURSE PRACTITIONER

## 2022-06-21 PROCEDURE — 3044F PR MOST RECENT HEMOGLOBIN A1C LEVEL <7.0%: ICD-10-PCS | Mod: CPTII,95,, | Performed by: NURSE PRACTITIONER

## 2022-06-21 PROCEDURE — 99214 PR OFFICE/OUTPT VISIT, EST, LEVL IV, 30-39 MIN: ICD-10-PCS | Mod: 95,,, | Performed by: NURSE PRACTITIONER

## 2022-06-21 PROCEDURE — 1159F MED LIST DOCD IN RCRD: CPT | Mod: CPTII,95,, | Performed by: NURSE PRACTITIONER

## 2022-06-21 PROCEDURE — 1159F PR MEDICATION LIST DOCUMENTED IN MEDICAL RECORD: ICD-10-PCS | Mod: CPTII,95,, | Performed by: NURSE PRACTITIONER

## 2022-06-21 PROCEDURE — 4010F ACE/ARB THERAPY RXD/TAKEN: CPT | Mod: CPTII,95,, | Performed by: NURSE PRACTITIONER

## 2022-06-21 PROCEDURE — 1160F PR REVIEW ALL MEDS BY PRESCRIBER/CLIN PHARMACIST DOCUMENTED: ICD-10-PCS | Mod: CPTII,95,, | Performed by: NURSE PRACTITIONER

## 2022-06-21 PROCEDURE — 3066F NEPHROPATHY DOC TX: CPT | Mod: CPTII,95,, | Performed by: NURSE PRACTITIONER

## 2022-06-21 PROCEDURE — 4010F PR ACE/ARB THEARPY RXD/TAKEN: ICD-10-PCS | Mod: CPTII,95,, | Performed by: NURSE PRACTITIONER

## 2022-06-21 PROCEDURE — 1160F RVW MEDS BY RX/DR IN RCRD: CPT | Mod: CPTII,95,, | Performed by: NURSE PRACTITIONER

## 2022-06-21 RX ORDER — AMOXICILLIN 875 MG/1
875 TABLET, FILM COATED ORAL 2 TIMES DAILY
COMMUNITY
Start: 2022-06-20 | End: 2022-07-25

## 2022-06-21 RX ORDER — VALACYCLOVIR HYDROCHLORIDE 1 G/1
1000 TABLET, FILM COATED ORAL 3 TIMES DAILY
COMMUNITY
Start: 2022-06-20 | End: 2022-06-21 | Stop reason: SDUPTHER

## 2022-06-21 NOTE — PATIENT INSTRUCTIONS
Gastritis (Adult)    Gastritis is inflammation and irritation of the stomach lining. It can be present for a short time (acute) or be long lasting (chronic). Gastritis is often caused by infection with bacteria called H pylori. More than a third of people in the [place] have this bacteria in their bodies. In many cases, H pylori causes no problems or symptoms. In some people, though, the infection irritates the stomach lining and causes gastritis. Other causes of stomach irritation include drinking alcohol or taking pain-relieving medicines called NSAIDs (such as aspirin or ibuprofen).   Symptoms of gastritis can include:  Abdominal pain or bloating  Loss of appetite  Nausea or vomiting  Vomiting blood or having black stools  Feeling more tired than usual  An inflamed and irritated stomach lining is more likely to develop a sore called an ulcer. To help prevent this, gastritis should be treated.  Home care  If needed, medicines may be prescribed. If you have H pylori infection, treating it will likely relieve your symptoms. Other changes can help reduce stomach irritation and help it heal.  If you have been prescribed medicines for H pylori infection, take them as directed. Take all of the medicine until it is finished or your healthcare provider tells you to stop, even if you feel better.  Your healthcare provider may recommend avoiding NSAIDs. If you take daily aspirin for your heart or other medical reasons, do not stop without talking to your healthcare provider first.  Avoid drinking alcohol.  Stop smoking. Smoking can irritate the stomach and delay healing. As much as possible, stay away from second hand smoke.  Follow-up care  Follow up with your healthcare provider, or as advised by our staff. Testing may be needed to check for inflammation or an ulcer.  When to seek medical advice  Call your healthcare provider for any of the following:  Stomach pain that gets worse or moves to the lower right abdomen (appendix  area)  Chest pain that appears or gets worse, or spreads to the back, neck, shoulder, or arm  Frequent vomiting (cant keep down liquids)  Blood in the stool or vomit (red or black in color)  Feeling weak or dizzy  Fever of 100.4ºF (38ºC) or higher, or as directed by your healthcare provider  Date Last Reviewed: 6/22/2015  © 5197-9518 [employer]. [address] [place] [address], [place] [address]. All rights reserved. This information is not intended as a substitute for professional medical care. Always follow your healthcare professional's instructions.

## 2022-06-21 NOTE — PROGRESS NOTES
Subjective:       Patient ID: Miri Earl is a 41 y.o. female Body mass index is 39.99 kg/m².    Chief Complaint: Other (Follow-up from EGD)    Established patient of Dr. Colunga, hepatology,JONNY POWELL, & myself.    The patient location is: in [place]. I verified patient name and date of birth. Patient provided current height and weight measurements.  The chief complaint leading to consultation is: Follow-up from EGD    Visit type: audiovisual    Face to Face time with patient: 13 minutes  37 minutes of total time spent on the encounter, which includes face to face time and non-face to face time preparing to see the patient (eg, review of tests), Obtaining and/or reviewing separately obtained history, Documenting clinical information in the electronic or other health record, Independently interpreting results (not separately reported) and communicating results to the patient/family/caregiver, or Care coordination (not separately reported).     Each patient to whom he or she provides medical services by telemedicine is:  (1) informed of the relationship between the physician and patient and the respective role of any other health care provider with respect to management of the patient; and (2) notified that he or she may decline to receive medical services by telemedicine and may withdraw from such care at any time.    GI Problem  The primary symptoms include weight loss (trying to lose weight with dieting), nausea (occasional; zofran prn or phenergan PRN), diarrhea (intermittent; reports diagnosed with IBS in the past by Dr. Colunga; watery stools at times) and hematochezia (occasional when she is constipated; moderate amount of bright red blood on tissue & in bowl; denies bleeding in between bowel movements; has not seen surgeon yet). Primary symptoms do not include fever, fatigue, abdominal pain, vomiting, melena or hematemesis. The illness began more than 7 days ago (reflux worsened a couple of months ago,  switched to dexilant 30 mg daily by PCP which has helped the dysphagia/globus sensation but reflux has persisted). The problem has been rapidly improving.   Risk factors: denies recent antibiotic/hospitalization, foreign travel, ill contacts, or suspect food intake.   The illness is also significant for constipation (occasional; denies currently, bowel movements are once daily currently of formed stool to loose stools). The illness does not include chills, dysphagia (has resolved since taking dexilant; egd with dilation helped in the past) or odynophagia. Associated symptoms comments: Diarrhea triggered worse with dairy (she avoids dairy). Significant associated medical issues include GERD (significantly improved since taking dexilant 60 mg once daily & pepcid 40 mg once daily; PAST TREATMENT: zantac stopped in 4/2020 due to FDA recall, nexium helped first time she tried it but not effective the last time tried, prilosec, rolaids, pepto), irritable bowel syndrome (PAST TREATMENT: colestid) and hemorrhoids. Associated medical issues do not include inflammatory bowel disease.     Review of Systems   Constitutional: Positive for weight loss (trying to lose weight with dieting). Negative for appetite change, chills, fatigue and fever.        Reports takes oxycodone PRN- taking a few times a month; also has pain pump in place with morphine; also taking Toradol PO PRN- last took over a month ago   HENT: Negative for sore throat and trouble swallowing.    Respiratory: Negative for cough, choking and shortness of breath.    Cardiovascular: Negative for chest pain.   Gastrointestinal: Positive for anal bleeding, constipation (occasional; denies currently, bowel movements are once daily currently of formed stool to loose stools), diarrhea (intermittent; reports diagnosed with IBS in the past by Dr. Colunga; watery stools at times), hematochezia (occasional when she is constipated; moderate amount of bright red blood on tissue &  in bowl; denies bleeding in between bowel movements; has not seen surgeon yet), nausea (occasional; zofran prn or phenergan PRN) and rectal pain (occasional; relates to external hemorrhoid). Negative for abdominal pain, dysphagia (has resolved since taking dexilant; egd with dilation helped in the past), hematemesis, melena and vomiting.   Neurological: Negative for weakness.       No LMP recorded. Patient has had a hysterectomy.  Past Medical History:   Diagnosis Date    Anxiety     Arthritis     Asthma     Breast cyst     Chronic back pain     Colon polyp     Depression     Diabetes mellitus     Elevated liver enzymes     Fibrocystic breast     Fibromyalgia     GERD (gastroesophageal reflux disease)     History of Chiari malformation     History of colitis     History of gastritis     Hypertension     Hypokalemia     IBS (irritable bowel syndrome)     Insomnia     MVP (mitral valve prolapse)     HERNANDEZ (nonalcoholic steatohepatitis) 2021    Neuropathy     Postmenopausal HRT (hormone replacement therapy)     Seasonal allergies     Sleep apnea with use of continuous positive airway pressure (CPAP)     Spondylitis     Thyroid nodule      Past Surgical History:   Procedure Laterality Date    APPENDECTOMY  10/15/2009  [place]    BACK SURGERY      BACK SURGERY      xs 4    BACK SURGERY       SECTION      x 3    CHOLECYSTECTOMY      COLONOSCOPY    [place]    COLONOSCOPY  08/15/2014    Dr. Colunga: 1 colon polyp removed, hemorrhoids, Mild colonic spasm consistent with irritable bowel syndrome; repeat in 5 years for surveillance; biopsy: TUBULAR ADENOMA.    COLONOSCOPY N/A 10/21/2020    Procedure: COLONOSCOPY;  Surgeon: Earl Colunga Jr., MD; 2 colon polyps removed, hemorrhoids, Mild colonic spasm consistent with irritable bowel syndrome. repeat in 5 years for surveillance; biopsy: polyps- TUBULAR ADENOMA & hyperplastic polyp; random TI & colon biopsies WNL     COLONOSCOPY W/ POLYPECTOMY  10/31/2008  [facility]    2 mm polyp in the sigmoid colon, TUBULAR ADENOMATOUS POLYP.  Erythematous mucosa rectum, proctitis (prep  proctitis?). Irritable bowel syndrome.     ESOPHAGOGASTRODUODENOSCOPY  08/15/2014    Dr. Colunga: Reflux esophagitis, No endoscopic esophageal abnormality to explain patient's dysphagia. Esophagus dilated, 54FR, history/examination was suspicious for an esophageal spasm; gastritis; biopsy: stomach- unremarkable, bishop test negative    ESOPHAGOGASTRODUODENOSCOPY N/A 10/21/2020    Surgeon: Earl Colunga Jr., MD;  Location: [facility]; Reflux esophagitis; Gastritis. Enlarged gastric folds; No endoscopic esophageal abnormality to explain patient's dysphagia. Esophagus dilated, 51FR; biopsy: duodenum WNL; stomach- REACTIVE GASTRITIS, negative H pylori    ESOPHAGOGASTRODUODENOSCOPY N/A 05/04/2022    Procedure: EGD (ESOPHAGOGASTRODUODENOSCOPY);  Surgeon: Earl Colunga Jr., MD;  Location: [facility];  Service: Endoscopy;  Laterality: N/A; Slight antritis, esophageal dilation performed; biopsy: stomach- mild chronic inactive gastritis with focal intestinal metaplasia (complete type), negative for dysplasia; negative for h pylori    GANGLION CYST EXCISION Right     HYSTERECT[place]Y  10/2007  [place]    LIVER BIOPSY  10/20/2008    Mild portal mixed leukocytic infiltrate,neutrophils and lymphocytes.  Bile ducts seen, negative for leukocytic infiltration.  Negative for steatohepatitis. Mild sinusoidal dilatation and congestion. Negative for granulomata nor malignancy.  Reticulum stain negative for hepaticfibrosis.  Negative iron stain.  PAS stain, negative for PAS-positive inclusions.    LUMBAR DISCECTOMY      LUMBAR FUSION      MYELOGRAPHY N/A 01/07/2019    Procedure: Myelogram lumbar and thoracic;  Surgeon: Hari Rich MD;  Location[facility];  Service: Radiology;  Laterality: N/A;    neurostimulator removal       OOPHORECTOMY      pain pump      PELVIC  LAPAROSCOPY      SELECTIVE INJECTION OF ANESTHETIC AGENT AROUND LUMBAR SPINAL NERVE ROOT BY TRANSFORAMINAL APPROACH Bilateral 01/30/2019    Procedure: BLOCK, SPINAL NERVE ROOT, LUMBAR, SELECTIVE, TRANSFORAMINAL APPROACH; L3;  Surgeon: Tez Edouard MD;  Location: [facility];  Service: Pain Management;  Laterality: Bilateral;    SPINAL CORD STIMULATOR IMPLANT      UPPER GASTROINTESTINAL ENDOSCOPY  10/31/2008  [facility]    Normal esophagus. Lax LES, non-erosive esophageal reflux (NERD?) Erythematous gastropathy on greater curve.  PRABHU Test performed on 10/31/08 was Negative.      Family History   Problem Relation Age of Onset    Diabetes Mother     Hypertension Mother     Diabetes Father     Hypertension Father     Colon cancer Neg Hx     Crohn's disease Neg Hx     Ulcerative colitis Neg Hx     Stomach cancer Neg Hx     Esophageal cancer Neg Hx     Cirrhosis Neg Hx      Social History     Tobacco Use    Smoking status: Current Every Day Smoker     Packs/day: 1.00     Years: 17.00     Pack years: 17.00     Types: Cigarettes    Smokeless tobacco: Never Used   Substance Use Topics    Alcohol use: Not Currently    Drug use: Yes     Types: Oxycodone, Morphine     Comment: medical marijuana      Wt Readings from Last 10 Encounters:   06/21/22 119.3 kg (263 lb)   05/19/22 119.3 kg (263 lb 1.9 oz)   05/03/22 117.9 kg (260 lb)   03/02/22 117.9 kg (260 lb)   03/02/22 117.9 kg (260 lb)   10/05/21 121.6 kg (268 lb)   07/22/21 128.4 kg (283 lb 1.1 oz)   07/06/21 128 kg (282 lb 3 oz)   06/16/21 131.5 kg (290 lb)   06/08/21 129.3 kg (285 lb 0.9 oz)     Lab Results   Component Value Date    WBC 9.64 06/01/2022    HGB 14.0 06/01/2022    HCT 44.2 06/01/2022    MCV 93 06/01/2022     06/01/2022     CMP  Sodium   Date Value Ref Range Status   06/01/2022 140 136 - 145 mmol/L Final   10/15/2015 143 137 - 145 MMOL/L Final     Potassium   Date Value Ref Range Status   06/01/2022 3.6 3.5 - 5.1 mmol/L Final   10/15/2015  4.6 3.5 - 5.1 MMOL/L Final     Chloride   Date Value Ref Range Status   06/01/2022 104 95 - 110 mmol/L Final   10/15/2015 109 (H) 98 - 107 MMOL/L Final     CO2   Date Value Ref Range Status   06/01/2022 25 23 - 29 mmol/L Final     Glucose   Date Value Ref Range Status   06/01/2022 109 70 - 110 mg/dL Final     BUN   Date Value Ref Range Status   06/01/2022 7 6 - 20 mg/dL Final     Creatinine   Date Value Ref Range Status   06/01/2022 0.8 0.5 - 1.4 mg/dL Final   10/15/2015 0.84 0.52 - 1.04 MG/DL Final     Calcium   Date Value Ref Range Status   06/01/2022 9.4 8.7 - 10.5 mg/dL Final     Total Protein   Date Value Ref Range Status   06/01/2022 7.0 6.0 - 8.4 g/dL Final     Albumin   Date Value Ref Range Status   06/01/2022 3.5 3.5 - 5.2 g/dL Final   10/15/2015 4.0 3.5 - 5.0 G/DL Final     Total Bilirubin   Date Value Ref Range Status   06/01/2022 0.6 0.1 - 1.0 mg/dL Final     Comment:     For infants and newborns, interpretation of results should be based  on gestational age, weight and in agreement with clinical  observations.    Premature Infant recommended reference ranges:  Up to 24 hours.............<8.0 mg/dL  Up to 48 hours............<12.0 mg/dL  3-5 days..................<15.0 mg/dL  6-29 days.................<15.0 mg/dL       Alkaline Phosphatase   Date Value Ref Range Status   06/01/2022 125 55 - 135 U/L Final     AST ([place])   Date Value Ref Range Status   01/19/2016 26 14 - 36 U/L Final     AST   Date Value Ref Range Status   06/01/2022 24 10 - 40 U/L Final     ALT   Date Value Ref Range Status   06/01/2022 43 10 - 44 U/L Final     Anion Gap   Date Value Ref Range Status   06/01/2022 11 8 - 16 mmol/L Final     eGFR if    Date Value Ref Range Status   06/01/2022 >60.0 >60 mL/min/1.73 m^2 Final     eGFR if non    Date Value Ref Range Status   06/01/2022 >60.0 >60 mL/min/1.73 m^2 Final     Comment:     Calculation used to obtain the estimated glomerular filtration  rate  (eGFR) is the CKD-EPI equation.        Lab Results   Component Value Date    HEPAIGM Negative 09/21/2020    HEPBIGM Negative 09/21/2020    HEPCAB Negative 09/21/2020     Lab Results   Component Value Date    LIPASE 40 01/23/2017     Lab Results   Component Value Date    TSH 0.873 06/01/2022    TSH 0.873 06/01/2022     10/21/2020 & 9/21/2020 stool studies reviewed (acidic stools)    Reviewed prior medical records including radiology report of 7/12/2021 CT abdomen; 4/6/2021 limited abdominal ultrasound; 12/27/2021 visit note with JONNY POWELL with hepatology; & endoscopy history (see surgical history).    Objective:      Physical Exam  Constitutional:       General: She is not in acute distress.     Appearance: She is well-developed.   Pulmonary:      Effort: Pulmonary effort is normal. No respiratory distress.   Skin:     Coloration: Skin is not pale.   Neurological:      Mental Status: She is alert and oriented to person, place, and time.   Psychiatric:         Speech: Speech normal.         Behavior: Behavior normal.         Thought Content: Thought content normal.         Judgment: Judgment normal.         Assessment:       1. History of esophagogastroduodenoscopy (EGD)    2. History of gastritis    3. Intestinal metaplasia of gastric mucosa    4. Nausea    5. Intermittent diarrhea    6. History of constipation    7. Hematochezia    8. History of hemorrhoids    9. Rectal pain    10. Hepatic steatosis    11. Hepatomegaly        Plan:       History of esophagogastroduodenoscopy (EGD), History of gastritis, & Intestinal metaplasia of gastric mucosa  - reviewed results of EGD with patient; discussed diagnosis with patient: recommend repeat EGD in 1-2 years (from previous EGD) to monitor finding, patient verbalized understanding  -  CONTINUE   dexlansoprazole (DEXILANT) 60 mg capsule; Take 1 capsule (60 mg total) by mouth before breakfast.  - CONTINUE PEPCID 40 MG NIGHTLY AS DIRECTED  - avoid/minimize use of NSAIDs-  since they can cause GI upset, bleeding and/or ulcers. If NSAID must be taken, recommend take with food.    Nausea  - gastric emptying study ordered  - CONTINUE ZOFRAN PRN AS DIRECTED FOR NAUSEA OR PHENERGAN PRN AS DIRECTED FOR NAUSEA (advised not to take together)  - discussed with patient that a side effect of narcotic pain medications is constipation & nausea, advised patient to talk to provider who manages pain medication, patient verbalized understanding  - continue with ultrasound as scheduled for 7/18/2022    Intermittent diarrhea  -     Pancreatic elastase, fecal; Future; Expected date: 06/21/2022  -     Stool Exam-Ova,Cysts,Parasites; Future; Expected date: 06/21/2022  -     Fecal fat, qualitative; Future; Expected date: 06/21/2022  -     Giardia / Cryptosporidum, EIA; Future; Expected date: 06/21/2022  -     pH, stool; Future; Expected date: 06/21/2022  -     Rotavirus antigen, stool; Future; Expected date: 06/21/2022  -     WBC, Stool; Future; Expected date: 06/21/2022  -     Stool culture; Future; Expected date: 06/21/2022  -     Clostridium difficile EIA; Future; Expected date: 06/21/2022  -     Adenovirus Molecular Detection, PCR, Non-Blood Stool; Future; Expected date: 06/21/2022    History of constipation  Recommend daily exercise as tolerated, adequate water intake (six 8-oz glasses of water daily), and high fiber diet. OTC fiber supplements are recommended if diet does not reach daily fiber goal (20-30 grams daily), such as Metamucil, Citrucel, or FiberCon (take as directed, separate from other oral medications by >2 hours).    Hematochezia, History of hemorrhoids, & Rectal pain  -     Ambulatory referral/consult to Colorectal Surgery ([place]); Future; Expected date: 03/09/2022; patient reports she will call to schedule appointment  - reviewed last colonoscopy which showed hemorrhoids and colon polyps which the polyps were removed; recommend treating hemorrhoids at this time; if bleeding  persists after treating hemorrhoids or worsens, then recommend to follow-up for continued evaluation & management to include lower endoscopy or go to the ER if bleeding is severe  - discussed about different etiologies that can cause rectal bleeding, such as but not limited to diverticulosis, polyps, colon mass, colon inflammation or infection, anal fissure or hemorrhoids.   - You may resume normal activity as long as you feel well.  - Avoid/minimize NSAIDs such as ibuprofen (Advil, Motrin) and naproxen (Aleve and Naprosyn).  - Avoid alcohol.  - avoid constipation and straining with bowel movements; try using an OTC stool softener as directed and increase fiber in diet (20-30 grams daily)/OTC fiber supplement such as metamucil (take as directed)  - recommend SITZ baths  - possible referral to general surgery if symptoms persist    Hepatic steatosis & Hepatomegaly  For fatty liver recommend: low fat, low cholesterol diet, maintain good control of blood sugars and cholesterol levels, exercise, weight loss (if overweight), minimize/avoid alcohol and tylenol products, & follow-up with hepatology for continued evaluation and management.    Follow up in about 3 months (around 9/21/2022), or if symptoms worsen or fail to improve.      If no improvement in symptoms or symptoms worsen, call/follow-up at clinic or go to ER.

## 2022-06-27 ENCOUNTER — PATIENT MESSAGE (OUTPATIENT)
Dept: FAMILY MEDICINE | Facility: CLINIC | Age: 42
End: 2022-06-27
Payer: COMMERCIAL

## 2022-06-27 RX ORDER — AZITHROMYCIN 500 MG/1
500 TABLET, FILM COATED ORAL DAILY
Qty: 5 TABLET | Refills: 0 | Status: SHIPPED | OUTPATIENT
Start: 2022-06-27 | End: 2022-07-02

## 2022-07-14 ENCOUNTER — PATIENT MESSAGE (OUTPATIENT)
Dept: GASTROENTEROLOGY | Facility: CLINIC | Age: 42
End: 2022-07-14
Payer: COMMERCIAL

## 2022-07-14 ENCOUNTER — TELEPHONE (OUTPATIENT)
Dept: GASTROENTEROLOGY | Facility: CLINIC | Age: 42
End: 2022-07-14
Payer: COMMERCIAL

## 2022-07-14 NOTE — TELEPHONE ENCOUNTER
"Please call to inform & review the results with the patient- radiology report of the gastric emptying study showed borderline findings (normal but on the upper limits) "gastric solid half emptying time is borderline, upper normal at 83 minutes". Recommend follow-up with Dr. Colunga for continued evaluation and management of this finding (please schedule patient follow-up appointment with Dr. Colunga).    Continue with previous recommendations. If no improvement in symptoms or symptoms worsen, call/follow-up at clinic or go to ER.    Thanks,        "

## 2022-07-25 ENCOUNTER — TELEPHONE (OUTPATIENT)
Dept: HEPATOLOGY | Facility: CLINIC | Age: 42
End: 2022-07-25

## 2022-07-25 ENCOUNTER — OFFICE VISIT (OUTPATIENT)
Dept: HEPATOLOGY | Facility: CLINIC | Age: 42
End: 2022-07-25
Payer: COMMERCIAL

## 2022-07-25 ENCOUNTER — PATIENT MESSAGE (OUTPATIENT)
Dept: HEPATOLOGY | Facility: CLINIC | Age: 42
End: 2022-07-25

## 2022-07-25 DIAGNOSIS — Z23 NEED FOR HEPATITIS A IMMUNIZATION: Primary | ICD-10-CM

## 2022-07-25 DIAGNOSIS — R79.89 ELEVATED LFTS: ICD-10-CM

## 2022-07-25 DIAGNOSIS — Z91.040 LATEX ALLERGY STATUS: ICD-10-CM

## 2022-07-25 DIAGNOSIS — I10 PRIMARY HYPERTENSION: ICD-10-CM

## 2022-07-25 DIAGNOSIS — K75.81 NASH (NONALCOHOLIC STEATOHEPATITIS): ICD-10-CM

## 2022-07-25 DIAGNOSIS — K74.00 LIVER FIBROSIS: ICD-10-CM

## 2022-07-25 DIAGNOSIS — E11.9 TYPE 2 DIABETES MELLITUS WITHOUT COMPLICATION, WITHOUT LONG-TERM CURRENT USE OF INSULIN: Primary | ICD-10-CM

## 2022-07-25 DIAGNOSIS — E78.2 MIXED HYPERLIPIDEMIA: ICD-10-CM

## 2022-07-25 DIAGNOSIS — E11.9 TYPE 2 DIABETES MELLITUS WITHOUT COMPLICATION, WITHOUT LONG-TERM CURRENT USE OF INSULIN: ICD-10-CM

## 2022-07-25 DIAGNOSIS — E66.01 CLASS 3 SEVERE OBESITY IN ADULT, UNSPECIFIED BMI, UNSPECIFIED OBESITY TYPE, UNSPECIFIED WHETHER SERIOUS COMORBIDITY PRESENT: ICD-10-CM

## 2022-07-25 DIAGNOSIS — R16.0 HEPATOMEGALY: ICD-10-CM

## 2022-07-25 DIAGNOSIS — I10 ESSENTIAL HYPERTENSION: ICD-10-CM

## 2022-07-25 PROCEDURE — 1159F MED LIST DOCD IN RCRD: CPT | Mod: CPTII,95,, | Performed by: PHYSICIAN ASSISTANT

## 2022-07-25 PROCEDURE — 3066F NEPHROPATHY DOC TX: CPT | Mod: CPTII,95,, | Performed by: PHYSICIAN ASSISTANT

## 2022-07-25 PROCEDURE — 1160F PR REVIEW ALL MEDS BY PRESCRIBER/CLIN PHARMACIST DOCUMENTED: ICD-10-PCS | Mod: CPTII,95,, | Performed by: PHYSICIAN ASSISTANT

## 2022-07-25 PROCEDURE — 1160F RVW MEDS BY RX/DR IN RCRD: CPT | Mod: CPTII,95,, | Performed by: PHYSICIAN ASSISTANT

## 2022-07-25 PROCEDURE — 3044F PR MOST RECENT HEMOGLOBIN A1C LEVEL <7.0%: ICD-10-PCS | Mod: CPTII,95,, | Performed by: PHYSICIAN ASSISTANT

## 2022-07-25 PROCEDURE — 3061F PR NEG MICROALBUMINURIA RESULT DOCUMENTED/REVIEW: ICD-10-PCS | Mod: CPTII,95,, | Performed by: PHYSICIAN ASSISTANT

## 2022-07-25 PROCEDURE — 99214 OFFICE O/P EST MOD 30 MIN: CPT | Mod: 95,,, | Performed by: PHYSICIAN ASSISTANT

## 2022-07-25 PROCEDURE — 3044F HG A1C LEVEL LT 7.0%: CPT | Mod: CPTII,95,, | Performed by: PHYSICIAN ASSISTANT

## 2022-07-25 PROCEDURE — 1159F PR MEDICATION LIST DOCUMENTED IN MEDICAL RECORD: ICD-10-PCS | Mod: CPTII,95,, | Performed by: PHYSICIAN ASSISTANT

## 2022-07-25 PROCEDURE — 99214 PR OFFICE/OUTPT VISIT, EST, LEVL IV, 30-39 MIN: ICD-10-PCS | Mod: 95,,, | Performed by: PHYSICIAN ASSISTANT

## 2022-07-25 PROCEDURE — 4010F ACE/ARB THERAPY RXD/TAKEN: CPT | Mod: CPTII,95,, | Performed by: PHYSICIAN ASSISTANT

## 2022-07-25 PROCEDURE — 3061F NEG MICROALBUMINURIA REV: CPT | Mod: CPTII,95,, | Performed by: PHYSICIAN ASSISTANT

## 2022-07-25 PROCEDURE — 4010F PR ACE/ARB THEARPY RXD/TAKEN: ICD-10-PCS | Mod: CPTII,95,, | Performed by: PHYSICIAN ASSISTANT

## 2022-07-25 PROCEDURE — 3066F PR DOCUMENTATION OF TREATMENT FOR NEPHROPATHY: ICD-10-PCS | Mod: CPTII,95,, | Performed by: PHYSICIAN ASSISTANT

## 2022-07-25 NOTE — PROGRESS NOTES
The patient location is: [place], [place]   The chief complaint leading to consultation is: elevated LFTs     Visit type: audiovisual    Face to Face time with patient: 20  30 minutes of total time spent on the encounter, which includes face to face time and non-face to face time preparing to see the patient (eg, review of tests), Obtaining and/or reviewing separately obtained history, Documenting clinical information in the electronic or other health record, Independently interpreting results (not separately reported) and communicating results to the patient/family/caregiver, or Care coordination (not separately reported).     Each patient to whom he or she provides medical services by telemedicine is:  (1) informed of the relationship between the physician and patient and the respective role of any other health care provider with respect to management of the patient; and (2) notified that he or she may decline to receive medical services by telemedicine and may withdraw from such care at any time.    Notes:     Hepatology Consultation: [facility]    ESTABLISHED PATIENT    Subjective      Ms. Earl is a 41 y.o. female who returns for continued evalation of elevated LFTs and hepatic steatosis. LFT review suggest mixed picture with mild alk phos elevations as well as ALT>AST; recently albumin a bit low. Plt WNL. Hepatomegaly + steatosis, without splenic enlargement on ultrasound 4/6/2021     RF for fatty liver include HTN, HLD, T2DM,   Weight gain in the past year or so, BMI 42  CCY 2007 8 back surgeries total -- she has a pain pump for this    We obtained a liver biopsy due to worsening elevated liver enzymes and MRE that was nondiagnostic due to patient's pan pump 5/14/2021.    Liver biopsy 6/16/2021  Final Pathologic Diagnosis Native liver (random biopsy):   - Macrovesicular steatosis 70%, microvesicular steatosis 15%, with   steatohepatitis (see CODI grading form below)   -  Steatohepatitis score 7, stage 2   - Negative for Ivett-Denk bodies   - Iron: No deposit (Grade 0/4)   - Trichrome: Stage 2/4 fibrosis   - Iron and trichrome stains with appropriate controls   CODI grade score 7/8   Fatty liver 3 (>67%)   Inflammation 2 (2 to 4 foci)   Balloon cells 2 (many)      She was thereafter considered for clinical trial but was not a candidate due to use of Ozempic and that she had lost significant amount of weight.     She lost weight at follow-up visit 12/2021 with improvement of liver enzymes. Switched to tradjenta with success. Unable to consult with nutritionist since.      Interval history 07/25/2022:  April SONIA Earl arrives today alone on video   She denies symptoms of hepatic decompensation including jaundice, abdominal distention or lower extremity swelling, hematemesis, melena, or periods of confusion suggestive of hepatic encephalopathy.  She continues tradjenta - working well and is currently at 255 lbs -- she has plateaued. Is no longer keeping a food diary.   Reflux has worsened , continued stomach pain - seeing GI  Fatigue is still there.      ETOH: rarely   Smoking: no   Illicit substances: no   Social: lives at home with young children   Prior liver biopsy: yes, 2001 @ [facility]   Prior liver disease: yes, fatty liver   FAMILY HISTORY: denies a history of liver cancer, cirrhosis, hepatitis; but mother w/ fatty liver.      PMH April has a past medical history of Anxiety, Arthritis, Asthma, Breast cyst, Chronic back pain, Colon polyp, Depression, Diabetes mellitus, Elevated liver enzymes, Fibrocystic breast, Fibromyalgia, GERD (gastroesophageal reflux disease), History of Chiari malformation, History of colitis, History of gastritis, Hypertension, Hypokalemia, IBS (irritable bowel syndrome), Insomnia, MVP (mitral valve prolapse), HERNANDEZ (nonalcoholic steatohepatitis) (6/23/2021), Neuropathy, Postmenopausal HRT (hormone replacement therapy), Seasonal allergies, Sleep  apnea with use of continuous positive airway pressure (CPAP), Spondylitis, and Thyroid nodule.   PSXH April has a past surgical history that includes Ganglion cyst excision (Right); Hysterectomy (10/2007  [employer]); Appendectomy (10/15/2009  [facility]);  section; Cholecystectomy; Back surgery; Lumbar discectomy; Lumbar fusion; Colonoscopy w/ polypectomy (10/31/2008  [facility]); Liver biopsy (10/20/2008); Pelvic laparoscopy; Spinal cord stimulator implant; Back surgery; Back surgery; pain pump; neurostimulator removal ; Oophorectomy; Myelography (N/A, 2019); Selective injection of anesthetic agent around lumbar spinal nerve root by transforaminal approach (Bilateral, 2019); Esophagogastroduodenoscopy (08/15/2014); Upper gastrointestinal endoscopy (10/31/2008  [facility]); Esophagogastroduodenoscopy (N/A, 10/21/2020); Colonoscopy (  [place]); Colonoscopy (08/15/2014); Colonoscopy (N/A, 10/21/2020); and Esophagogastroduodenoscopy (N/A, 2022).   FH April's family history includes Diabetes in her father and mother; Hypertension in her father and mother.    April reports that she has been smoking cigarettes. She has a 17.00 pack-year smoking history. She has never used smokeless tobacco. She reports previous alcohol use. She reports current drug use. Drugs: Oxycodone and Morphine.   ALG April is allergic to doxycycline; iodinated contrast media; latex, natural rubber; shellfish containing products; morphine; and symproic [naldemedine].   MED April has a current medication list which includes the following prescription(s): albuterol, amoxicillin, atorvastatin, budesonide, cetirizine, cyanocobalamin, dexlansoprazole, epinephrine, famotidine, gabapentin, ketorolac, levalbuterol, tradjenta, lisinopril, metoprolol succinate, montelukast, morphine, ondansetron, oxycodone, potassium chloride sa, promethazine, and sodium chloride 0.9% 0.9 % SolP with bupivacaine 0.5% (5 mg/ml) 0.5 % (5 mg/mL) Soln 0.1 %.        ROS:   As per hpi     Objective     Physical exam is limited by video visit:   Friendly, in no acute distress; alert and oriented to person, place and time  VITALS: There were no vitals taken for this visit.   SKIN/EYES: No obvious jaundice or yellowing of the eyes.   HENT: Normocephalic, without obvious abnormality.   NECK: Supple.   CARDIOVASCULAR: ANITA on video visit  RESPIRATORY: Normal respiratory effort.   GI: ANITA hepatosplenomegaly  PSYCH: Thought and speech pattern appropriate.         Prior serologic workup:   Lab Results   Component Value Date    SMOOTHMUSCAB <1:20 06/07/2021    AMAIFA Negative 10/14/2008    IGGSERUM 1004 06/07/2021    ANASCREEN None Detected 06/07/2021    FERRITIN 138 (H) 06/07/2021    HEPBSAG Negative 07/18/2022    HEPCAB Negative 07/18/2022    HEPAIGM Negative 09/21/2020       US Abdomen Limited  EXAMINATION:  US ABDOMEN LIMITED 07/18/2022 at 09:39    INDICATION:  Clinical history is provided of hepatic steatosis, abnormal laboratory values.  Previous cholecystectomy.  No history of recent trauma or surgery is provided.    COMPARISON  CT abdomen report of 07/12/2021.  Abdominal ultrasound report of 04/06/2021.    FINDINGS  No free fluid collections are appreciated. The liver measures 19.8 cm and demonstrates heterogeneous, hyperechoic echogenicity.  No gross contour deformity is appreciated.  Portal venous flow is within normal.  The common bile duct measures 6 mm.  The pancreas is largely obscured. There is bowel gas, penetration artifact present.  No fluid-filled gallbladder is appreciated at the anatomic location corresponding to the clinical history provided.  No history of tenderness is provided. The right kidney measures 13.5 x 5.4 x 5.2 cm. No echogenic obstructive calculus or hydronephrosis is appreciated. The proximal aorta measures 1.9 cm, mid 1.5 cm and distal 1.2 cm with unremarkable adjacent IVC color Doppler flow demonstrated.  No other significant interval changes  are appreciated.    IMPRESSION  1.  No free fluid collection is appreciated.  2. There is heterogeneous, hyperechoic echotexture of the liver overall similar.  This could be seen with steatosis, chronic hepatic changes.  3.  No echogenic obstructive calculus, biliary dilatation or hydronephrosis is appreciated.    Electronically signed by: Juan Humphrey MD  Date:    07/18/2022  Time:    10:59        Assessment/Plan     41 y.o. White female with:    1. HERNANDEZ, biopsy proven, with stage 2 fibrosis [elevated LFTs]  - 12/2021: LFTs improved with 40 lb weight loss. Encouraged to keep going.   - 7/2022: continued improvement in LFTs. Recently was on augmentin for sinus infection which could explain mild rise in AST/ALT.   - not a candidate for clinical trials as she started ozempic, then switched to tradjenta with significant weight loss  - recommend HCC screening annually   - vaccinated against HBV. Needs HAV, however with latex allergy. Will contact [facility] to ensure they have HAVRIX and can use non-latex covered needle    2. Hepatomegaly, secondary to HERNANDEZ   - 24cm measurement on MRE 5/2021  - 19 cm measurement on ultrasound 7/2022, improved with weight loss.     3. Metabolic syndrome   - with 40 lb weight loss since last visit  - on tradjenta  - f/w endocrine for thyroid disease  - 7/2022: a1c has improved as well       Orders Placed This Encounter   Procedures    US Abdomen Limited    AFP Tumor Marker    Hepatic Function Panel       · COURSE: IMPROVING. Patient with weight loss of 40 lbs and improving enzymes. Discussed recommend maintaining this weight loss.   · 2021: Not a candidate for HERNANDEZ clinical trials.  · Recommend annual f/u with AFP & ultrasound, due July 2023  · Recommend HAV vaccination with Havrix vial and non-latex needle/injector. Will send to [facility].      I spent 30 minutes on the day of this encounter preparing for, evaluating, treating, and managing this patient.     Thank you for  allowing me to participate in the care of Miri Sen PA-C  Hepatology Advanced Practice Provider  [facility]

## 2022-07-25 NOTE — TELEPHONE ENCOUNTER
HAV vaccine with caution of latex allergy sent to [facility]. Reaching out to ensure they can use syringe without latex.     I instructed patient on video visit today not to obtain vaccine until confirmed with pharmacy. Sent to [facility] (do not obtain at retail pharmacy otherwise)

## 2022-07-27 ENCOUNTER — TELEPHONE (OUTPATIENT)
Dept: HEPATOLOGY | Facility: CLINIC | Age: 42
End: 2022-07-27
Payer: COMMERCIAL

## 2022-08-08 ENCOUNTER — TELEPHONE (OUTPATIENT)
Dept: HEPATOLOGY | Facility: CLINIC | Age: 42
End: 2022-08-08
Payer: COMMERCIAL

## 2022-08-22 DIAGNOSIS — M54.17 RADICULOPATHY, LUMBOSACRAL REGION: ICD-10-CM

## 2022-08-22 DIAGNOSIS — G89.4 CHRONIC PAIN SYNDROME: Primary | ICD-10-CM

## 2022-08-22 DIAGNOSIS — G95.9 DISEASE OF SPINAL CORD, UNSPECIFIED: ICD-10-CM

## 2022-08-31 ENCOUNTER — CLINICAL SUPPORT (OUTPATIENT)
Dept: PRIMARY CARE CLINIC | Facility: CLINIC | Age: 42
End: 2022-08-31
Payer: COMMERCIAL

## 2022-08-31 DIAGNOSIS — K75.81 NASH (NONALCOHOLIC STEATOHEPATITIS): ICD-10-CM

## 2022-08-31 DIAGNOSIS — E11.9 TYPE 2 DIABETES MELLITUS WITHOUT COMPLICATION, WITHOUT LONG-TERM CURRENT USE OF INSULIN: ICD-10-CM

## 2022-08-31 PROCEDURE — 97802 MEDICAL NUTRITION INDIV IN: CPT | Mod: 95,,,

## 2022-08-31 PROCEDURE — 97802 PR MED NUTR THER, 1ST, INDIV, EA 15 MIN: ICD-10-PCS | Mod: 95,,,

## 2022-08-31 NOTE — PATIENT INSTRUCTIONS
Name: Miri Earl     Date: 08/31/2022     Daily Energy Requirements:  Calories: 1,600kcal -1,700kcal  Protein: 150 - 160 grams   Carbohydrates:  140 - 150 grams     *Limit added sugar to 20g or less per day   Total Fats: 40 - 50 grams      *Focus on Heart Healthy Plant-based Fats  Fluid: 120 - 130 fluid ounces + sweat loss        Meal Plan Guidelines:      Breakfast: 6:30/7:30am  4-5 ounces lean protein (28 - 35 grams) + 2 servings of Carbohydrates (30 - 45 grams) + unlimited non-starchy vegetables+ heart healthy fats      Snack: 10:00pm/10:30pm   2-3 ounces lean protein (14 - 21 g) + 1-2 serving of Carbohydrates (15 - 30 g) + unlimited non-starchy vegetables + heart healthy fats     Lunch: 1:00pm/1:30pm   5-6 ounces lean protein (35 - 42 g) + 1-2 servings of Carbohydrates (15 - 30 g) + Unlimited Non-starchy vegetables+ heart healthy fats     Snack: 3:30pm/4:00pm   2-3 ounces lean protein (14 - 21 g) + 1-2 serving of Carbohydrates (15-30 g) + unlimited non-starchy vegetables + heart healthy fats     Dinner: 6:30pm/7:00pm    4-6 ounces of lean protein (28 - 42g) + Unlimited Non-Starchy Vegetables + 1-2 Serving of Carbohydrate (15 - 30 g) + heart healthy fats         Individual Meal Options:      Suggested Meal Options for Breakfast   Fruit Smoothie + Eggs  1 cup frozen strawberries + ½ banana   6-8 ounces of unsweetened vanilla almond/coconut/oat milk   1 cup fresh spinach leaves   1-2 scoops of collagen peptides ([place] or Vital Proteins)   2 scrambled eggs and 1 oz of [place] Naturals turkey sausage      Egg Bite Muffins (recipe below makes 12 servings) + Fruit   1 serving of fruit     8 oz almond milk   3 Egg muffins:     6 large eggs   6 oz [place] Naturals turkey sausage or Aidell's chicken sausage   2 cup fresh, chopped spinach  ½ cup chopped red bell pepper and onions   ¼ cup sliced cherry tomatoes   ¼ tsp paprika, ½ tsp garlic powder, pepper to taste    Overnight Oats (Recipe below makes ~  4 servings, so you can have them ready to go for the week - combine all ingredients and place in refrigerator overnight)    1 serving of nuts or nut butter   ¼ cup blackberries    ¾ cup overnight oatmeal:   2 - 2.5 cup Nondenominational Oats, uncooked  2 Tbsp Carolyn seeds  4 oz plain almond or coconut yogurt   About cups unsweetened almond milk or water (enough to cover the oats to absorb the liquid)  Add pumpkin puree/ vanilla extract / cinnamon  Truvia/stevia/allulose/monk fruit for a plant-based sweetener (optional)   Open Faced Egg [place]   1 slice gluten free bread (see shopping guide for other recommended brands)   2 cooked eggs with veggies   Top with 1 tsp carolyn seed and pepper  1-2 oz of [place] Natural - no sugar corcoran or other lean protein   1 serving fruit (berries, banana, orange, cantaloupe, or kiwi)     Scrambled Eggs and Roasted Potatoes    2 egg + 4 eggs whites   Unlimited non-starchy vegetables (asparagus, bell pepper, spinach, onions, mushroom, etc.)  2 tbsp salsa (optional)  ½ cup roasted red potatoes, cubed   1 kiwi or ½ cup melon   Oatmeal Banana Pancakes  3 pancakes   1 serving of approved fruit   1 tbsp peanut butter   Ingredients (Makes 8-10 pancakes)   1 ½ cups rolled or steel cut oats  4 large whole eggs   1-2 ripe banana (substitute with or add for extra flavor: apples, blueberries, pumpkin, sweet potatoes)   2 cups of fresh spinach (optional)   1 tsp. vanilla extract  1 tsp. cinnamon  ½ tsp. ground nutmeg   ¼ tsp. pumpkin pie spice (optional)  Plant-based sweetener (optional)    Healthy add-ins for a nutrient boost:  1 Tbsp flaxseeds or carolyn seeds; 2 Tbsp collagen  Directions:  In a , blend all ingredients until smooth. Heat a griddle or large non-stick skillet over medium-low heat. Spray with non-stick cooking spray. For each pancake pour ¼ cup of batter onto griddle. Flip when they start to bubble. Cook until lobato brown. Recipe should make 8 pancakes.      Suggested Meal Options for  Lunch and/or Dinner    Pesto Chicken with Edamame Pasta   4 -6 oz grilled chicken   ¾ cup cooked edamame pasta + 2 Tbsp pesto (from store bought jar) + mix with cooked artichoke and red bell pepper   At least 1 cup cooked greens/non-starchy vegetables (sautéed/baked with olive oil)   Fajita Chicken Lettuce Wraps:    4 -6 ounces of fajita chicken (see sample recipe here)  Leaves of vianney    Top with ½ avocado, salsa, lettuce, tomatoes, shredded carrots, etc.    1/3 cup lime brown rice and quinoa mixture    Loaded Nachos   1-2 servings, 11-22 Beanitos chips  1 ounce guacamole and/or salsa   4 - 6 ounces of lean protein (shrimp, flank steak, chicken)  Add-ons: chives, jalapenos, cilantro, lettuce, peppers    Juan Box   ½ cup chicken salad   1 serving gluten free crackers (Capac Nut Thins or Mame's Gone crackers)   1 cup non-starchy vegetables (carrots, bell peppers, cucumbers, etc.)  2 tbsp hummus  1 boiled egg   1 serving of grapes   Lean & Clean   4-6 oz fresh, cooked protein - roasted or grilled chicken/pork tenderloin/sirloin   At least 1 cup cooked greens/non-starchy vegetables (sautéed/baked with 1 -2 tbsp olive oil)  ½ cup cooked starchy vegetable or bean pasta (chickpea pasta, sweet potato, roasted red potatoes, lima beans, corn)    Chicken + Whole Grain Power Bowl:   1/3 quinoa (instead of white rice)   4-6 oz grilled chicken  1 cup mixed salad mix (for base of the bowl)   Sample recipe (I would add more veggies, such as zucchini, squash, and bell peppers, as well as avoid the cheese)    Additional meal ideas:   Veggie & Hummus [place] Recipe  Unified Office  3-Ingredient Creamy Rotisserie Chicken Salad Recipe  Unified Office  [email or web address]   Cheesy Spinach-&-Artichoke Stuffed Spaghetti Squash Recipe  EatingWell  Vegetarian Chopped Power Salad with Creamy Cilantro Dressing Recipe  EatingWell   Spinach & Feta Baker Meatballs with Herbed Quinoa  Recipe  EatingWell  Roasted Red Peppers Stuffed with Kale & Rice Recipe  [facility]  Weight-Loss Cabbage Soup Recipe  [facility]  Note: Add 5 -7 oz lean protein to vegetarian meals       Suggested Snack Options    2 energy bits (see recipe here) - try sunflower or pumpkin butter; avoid chocolate chips  1/2 cup Hummus + 1 cup carrots and/or red bell pepper   1 serving Beanitos + ½ cup Wholly guacamole   RX bar, Aneta Bar, Think Bar, No Cow, Barnard bars, Bulletproof Collagen Protein bar   2 Chomps jerky + 1 orange   8 oz almond or coconut yogurt + 1-ounce nuts/seeds + 1 tbsp grain free granola   2 eggs + cucumbers + ¼ cup berries   2-3 cups Skinny Pop or Pop Zero + 1 serving turkey pepperoni + 1-ounce almond cheese     ½ cup berries + a dollop coconut whip cream + 1 serving of cocoa dusted almonds   Dole Dippers (in the freezer section)  Fruit smoothie   1 rice cake with 1 tbsp pumpkin butter + Orgain Protein drink   12 Nut Thins + 2 oz of lactose free cheese    Crepini egg wrap + 2 oz of turkey or chicken + non-starchy veggies      Additional Recipe Resources & Notes:   Blogs and websites that typically have balanced recipes, still vet out the ingredients to your specific needs.    Downshiftology   GoingOn   Liseth's Savory [facility]  Swerve Sweetener (good for dessert recipes)  Incorporate a source of lean protein, fiber, and heart healthy fat with each meal    These three nutrients take the longest to digest, so we feel full longer and it helps not spike our blood glucose levels   4 things to limit/avoid that cause inflammation to our body:  White/refined carbohydrates  Added Sugar  Alcohol  Fried foods  Carb swap ideas:  Hearts of palm-based 'linguini' - Brand example: Jagruti,  Sayra, Whole Foods    Riced cauliflower or broccoli - can make from scratch + available in convenient frozen Steamables as well as shelf stable pouches  Cauliflower mash to mimic mashed potatoes  Zoodles [I don't recommend buying  these frozen---they get watery---I recommend spiralizing yourself]  Spaghetti squash  Clarke'flour frozen flatbreads and pizza crusts - any flavor  Black been pasta, Banza chickpea pasta, lentil pasta, edamame spaghetti pasta    Egg Wraps and low carb wraps   Think outside the box for more low carb dinner options:   Kabobs, stir payne with riced cauliflower or riced broccoli, stuffed bell peppers with no rice, lettuce wraps, eggplant lasagna, Mediterranean grilled shrimp + veggies over riced cauliflower, request a sushi roll that contains raw fish to be made with brown rice or cucumber       Roasted Vegetable and Sausage Sheet Pan Meal   Ingredients (4-6 Servings)    2 medium sweet potatoes, peeled and cut into medium-sized cubes  3 tbsp. olive oil   1 tbsp. balsamic or apple cider vinegar  1 pkg. (4-5 links) pre-cooked chicken or venison sausage   1 red bell pepper, cut into 1-inch pieces  1 large zucchini, cubed    1 tsp. garlic powder   Fresh ground pepper to taste   Salt to taste   Note: Add more veggies:  serve dish on top of spaghetti squash, add mushrooms, artichokes or carrots to the dish, or serve with roasted Bok abril  Directions  Preheat oven to 450° and spray sheet pan with non-stick spray.   Cut up sweet potatoes about 1 inch across.   Whisk together oil, balsamic vinegar, garlic powder, salt and pepper. Put sweet potatoes in a bowl and toss with half the oil-vinegar mixture.   Spread sweet potatoes out on sheet pan. Put in the oven to roast for 15 minutes.   While potatoes are roasting, slice sausage into ¾ inch thick pieces. When potatoes are finished, add sausage pieces to sheet pan and roast for 10 more minutes.   Cut broccoli into pieces then toss with the rest of the oil-vinegar mixture.   Remove pan from oven and turn sausage pieces over. Then spread sausage and sweet potatoes apart and tuck broccoli pieces between them.   Put sheet pan back in the oven and cook for 10-15 minutes or until broccoli  "is done to your liking and potatoes and sausage are browned.   Season with a little salt and pepper if needed and serve hot.            PRODUCE  [] All fresh fruit   [] All fresh vegetables   [] All fresh herbs  [] All herb purees + pastes  [] Pre-spiralized vegetable noodles   [] Steam-In-The-Bag begetables  [] Riced cauliflower  [] Jicama sticks  [] Love Beets  all varieties  [] Wholly Guacamole  all varieties  [] Hummus  all varieties, chickpea + vegetable  [] Tofu Shirataki noodles    [] Tofu  all varieties  [] Tempeh  all varieties    PROTEIN  CHICKEN   [] Boneless, skinless breasts  [] Boneless, skinless thighs  [] Ground chicken breast, at least 93% lean  [] Chicken breast cutlet  [] Aidell's  Chicken Sausage + Chicken Meatballs    TURKEY   [] Turkey breast tenderloin   [] Ground turkey breast, at least 93% lean  [] Marquise Naturals  Turkey Sausage    BEEF  [] Tenderloin  [] Sirloin  [] Top Loin  [] Flank Steak  [] Round Steak  [] Filet  [] Lean ground beef, at least 93% lean + grass-fed preferable    PORK  [] Tenderloin  [] Pork Chop  [] Center Cut  [] Howell Naturals  No-Sugar [place]    BISON  [] Chisholm  90 - 95% lean    SEAFOOD  [] All fresh fish + seafood; locally sourced when possible  [] Smoked salmon    HEAT + EAT ENTREES   [] Carson's Natural Foods  Chicken, Pork, Beef  [] Alvaro  "All Natural" Grilled Chicken Breast + Strips, all varieties    SAUCES SPREADS + DIPS  [] Bitchin Sauce  Original, Chipotle, Cilantro Tererro  [] Radha's Kitchen  Tzatziki Yogurt Dip, Babaganoush, Hummus  [] Wholly Guacamole  all varieties  [] Hummus  all varieties  [] [place] Gringo Salsa  all varieties  [] Mrs. Cesar's Salsa  all varieties  [] Stubb's All Natural BBQ Sauce  [] Primal Kitchen  Wilson, Ketchup, BBQ Sauce  [] Primal Kitchen Pasta Sauce  Roasted Garlic, Tomato Basil, no-dairy Vodka Sauce  [] Sal & [place]'s  HeartSmart Pasta Sauce    DAIRY/DAIRY SUBSTITUTES/EGGS  EGGS   [] All eggs  " cage-free, pasture-raise preferable  [] Crepini  egg wraps  [] Vital Farms  Pasture-Raised Egg Bites  [] JUST Egg [vegan]     CREAMERS   [] Califia  Better Half, original + vanilla unsweetened  [] NutPods  all varieties    MILK   [] Horizon Organic  all varieties except chocolate  [] Organic Valley  all varieties except chocolate  [] Organic Valley  ultra-filtered, reduced fat milk     PLANT_BASED MILK ALTERNATIVES  [] All unsweetened almond milks  original, vanilla + chocolate  [] Ripple  unsweetened   [] Milkadamia  original +_ vanilla, unsweetened   [] Forager  original + vanilla, unsweetened   [] Silk Organic  soy milk, unsweetened  [] Oatly  unsweetened  [] Califia  regular + protein-fortified oat milk, unsweetened     CHEESES  [] Regular or reduced fat cheeses  [] BelGioso  Fresh Mozzarella Snack Packs, Parmesan Power-full Snack   [] Goat cheese  [] Fresh mozzarella  [] String cheese  all varieties  [] Cindy Cottage Cheese  [] Desi's Cultured Cottage Cheese  [] Mona Life 'Just Like Mozzarella'  plant-based shreds and other varieties  [] Parmela Creamery  plant-based shredded cheese    YOGURT  [] Fage  2% low-fat, plain  [] Siggi's  plain, vanilla  [] Chobani Greek  nonfat + whole milk yogurt, plain   [] Chobani Less Sugar  all flavored varieties   [] Oikos Greek  nonfat, plain  [] Two Good  all varieties   [] Hungarian Provisions  plain  [] [place] Organic  low-fat + nonfat, plain  [] [facility]  goat milk yogurt, plain  [] Kefit  unsweetened, plain  [] Forager  Greek style unsweetened, plain [dairy-free]  [] [place]  unsweetened Greek style, plain [dairy-free]  [] [place]  almond milk yogurt, vanilla or plain, unsweetened [dairy-free]    FREEZER SECTION  FROZEN VEGGIES  [] All plain frozen veggies + greens [e.g. broccoli, brussels, carrots, okra, mushrooms, zucchini, yellow squash, butternut squash, kale, spinach, emilia greens]  [] Riced veggies [e.g. cauliflower,  broccoli, butternut squash]  [] Edamame  all varieties  [] Green Giant  [] Veggie Spirals  [] Marinated Veggies [e.g. eggplant, peppers, zucchini]  [] Simply Steam Jolley Sprouts  [] Birds Eye  [] Power Blend Italian Style  lentils, broccoli, kale, zucchini  []  Jolley Sprouts & Carrots  [] Oven Roasters Broccoli & Cauliflower  [] [place] Blend  [] Tattooed   [] Green Bean Blend  [] Farmer's Market Ratatouille  [] Butter Balsamic Glazed Vegetables  [] Riced Cauliflower & Quinoa Mediterranean Style  [] Makeda's Good Life  Southern Style Greens [sauteed kale + onion]    FROZEN FRUITS  [] All unsweetened frozen fruits  all varieties  [] Dole Fruits & Veggie Blends  Berries 'n Kale  [] Dole Mix-ins  Triple Berry     FROZEN ENTREES  [] The Good Kitchen meals  all varieties [ e.g. Chili Lime Chicken Over Riced Cauliflower]  [] Premium Paleo  Not Gene Momma's Meatloaf  [] Primal Kitchen  Chicken Pesto + Steak Fajitas w/ Peppers & Onions  [] Eating Well Frozen Entrees  Butter Chicken Masala, Steak Carne Asada, Creamy Pesto Chicken, Chicken + Wild Rice Stroganoff, Yellow [place] Chicken, Sun-dried Tomato Chicken, Chicken Lo Mein  [] Realgood Entree Bowls  Palestinian Inspired Beef Bowl over Riced Cauliflower, Chicken Burrito Bowl   [] Great Karma Coconut [place]  [] Naomi's  Tamale Beltran with Black Beans, Vegetable Lasagna  [] Kashi Mayan [place] Bake  [] Healthy Choice  Simply Steamers Chicken Fried Rice  [] Basil Pesto Chicken & Justice Style Pork Power Bowls  [] Tattooed   Enchilada Bowl  [] An Farms  Spicy Black Bean Burgers    FROZEN PIZZAS  [] Cauli'flour Foods  Cauliflower Pizza Crusts  [] Outer Aisle  Cauliflower Crust  [] Naomi's  Veggie Crust Cheese Pizza  [] Quest Pizza     VEGETARIAN PRODUCTS  [] Beyond Meat  ground 'meat' + grilled 'chicken' strips  [] Tofurkey  Original Italian Sausage + Original Tempeh  [] Gardein  Beefless Ground + Meatless Meatballs  [] An  Farms Grillers  Original Burger, Crumbles, Meatballs    ICE CREAMS + FROZEN DESSERTS  [] Halo Top  regular + keto series, pops  [] Rebel  ice cream + dessert sandwiches  [] Enlightened  ice cream + bars  [] Nightfood  ice cream  [] Realgood  ice cream  [] Arctic Zero Fit  frozen pint  [] The Frozen Farmer  sorbets  [] Wholly Rollies  Protein Balls, all varieties  [] Dream Pops  Coconut Latte    FROZEN BREAKFASTS  [] Realgood  Breakfast Sandwiches on Cauliflower Cheesy Bread  [] Rebel  ice cream + dessert sandwiches  [] Enlightened  ice cream + bars  [] Nightfood  ice cream  [] Realgood  ice cream  [] Arctic Zero Fit  frozen pint  [] The Frozen Farmer  sorbets  [] Wholly Rollies  Protein Balls, all varieties  [] Dream Pops  Coconut Latte    BREADS/BUNS/WRAPS  [] Jose Bread: All Types - In Freezer Section   [] Flat Out Light Wraps - All Varieties   [] Flat Out Protein Up Carb Down Flat Bread   [] Kontos Whole Wheat Pocket Asmita   [] Yosef ARTURO 100% Whole Wheat Tortillas   [] LaTortilla Factory Tortillas - Smart & Delicious; 50 or 80-calorie   [] Nature's Own 100% Whole Wheat Bread   [] Orowheat Healthful - 100% Whole Wheat Slice Bread and Malott Thins   [] Orowheat Healthful - Whole Wheat Nuts & Grain Bread; Flax & Seed Bread   [] Pepperidge Farm Natural Whole Grain 15 Bread   [] Pepperidge Farm Natural Whole Grain English Muffin - 100% Whole Wheat   [] Pepperidge Farm Very Thin 100% Whole Wheat   [] Genesis Alanis 45 Calories and Delightful   [] Tez' 100% Whole Wheat Thin-Sliced Bagels and English Muffins   [] Western Bagel: Perfect 10     GLUTEN FREE  [] Antony's Gluten Free Bread   [] Lamy Bakehouse 7-Grain Gluten Free Bread     LEGUME PASTA   [] Explore Asian Organic Black Bean Spaghetti   [] Modern Table   [] Tolerant Foods       NUT BUTTERS & JELLIES    NUT BUTTERS   [] Better'n Chocolate: Coconut Chocolate Peanut Butter Spread   [] Better'n Peanut Butter - All Types   [] Earth Balance Coconut  and Peanut Spread   [] Naman's Nut Pace   [] MaraNatha: All Natural Roasted Cashew Butter - Heath or Creamy   [] MaraNatha: Roasted Peanut Butter   [] Nuts 'N More Peanut Pace - All Flavors   [] PB2 Powder - Original or Chocolate   [] Skippy Natural - Creamy, Super Chunk   [] Smart Balance Peanut Butter - Heath or Creamy   [] Peanut Butter & Company:   [] Smooth , Crunch Time, The Heat Is On, Old Fashioned Smooth, Mighty Nut- Powdered Peanut Butter, Squeeze Pack   [] Smucker's Natural Peanut Butter - Heath or Creamy   [] Sunbutter Nut Butter   [] Wild Friends Protein Peanut Butter/[place] o Butter - Vanilla or Chocolate     JELLIES  o Polaner's All Fruit   o Clearly Organic Best Choice: Strawberry Fruit Spread       SNACKS    BARS  [] Kashi Bars - Chewy or Crunchy; Honey [place] o Flax or Peanut Butter   [] KIND Bars - 5 Grams of Sugar or Less   [] KIND Protein Bars - Strong and KIND   [] Nature Valley Protein Bar - All Varieties   [] Nature Valley Roasted Nut Crunch - [place] Crunch; Peanut Crunch   [] [facility] Simple Nut Bar - Roasted Peanut & Honey   [] [facility] Valley Simple Nut Bar - [place], Cashew & Sea Salt   [] [facility] Nut [place] Bar - Salted Caramel Peanut   [] Think Thin Protein Bars   [] Quest Bars, Power Crunch Bars, Pure Protein Bars     BEEF JERKY - NITRATE FREE   [] Game On   [] Grass Run Farms   [] Krave   [] Ostrim   [] Perky Jerky   [] Primal Strips Meatless Vegan Jerky   [] [place]     CHIPS   [] Beanitos Chips   [] Fruit Crisps - e.g. Brother's-All-Natural, Bare Fruit, Yoga Chips   [] Barbara's Soy Crisps: 1.3 ounce bag   [] Quest Protein Chips   [] Wasa Whole Wheat Crisp Bread     CRACKERS  [] Mame's Gone Crackers   [] Nabisco Triscuit: Regular and Thin Crisp Crackers   [] Vans Say Cheese Crackers (G-F)     POPCORN/NUTS   [] Nick Strickland's Smart Pop Popcorn - Single Serving   [] 100-Calorie Pack of Nuts - All Varieties     PROTEIN POWDERS & DRINKS  []   Protein -  Whey Protein Powder   [] Garden of Life Raw Protein Powder   [] Iconic Ready-To-Drink Protein Drink   [] Dodge One Protein Powder   [] VegaSport Protein Powder     SALSA/HUMMUS/DIPS   [] Eat Well Embrace Life: Ana Juanitaclevegee Carrot o Hummus   [] Pre-Portioned Guacamole Packs   [] Micah's   [] Tostitos Restaurant Style Salsa       SOUPS   [] Naomi's Organic Soups - Lentil, Vegetable, Split Pea, Low-Sodium     CANNED GOODS   [] 100% Pure Pumpkin   [] BlueRunner Creole Cream-Style Red Beans or Navy Beans   [] Cajun Power Chicken Gumbo Base   [] Chicken of the Sea [place]   [] Leon Fresh Cut Sliced Beets   [] Hormel Breast of Chicken in Water   [] LeSuer Tender Baby Whole Carrots   [] Natanaellcatia Tabasco [place] Starter   [] Francoisist: Chunk Lite Tuna in Water, Gourmet Select Pouches   [] StarKist: Yellowfin Tuna Fillets   [] Trappey's: Kidney, Butter, Herrera, Black Eye, Field, and Black Beans   [] SÁNCHEZ Noriega's Turnip Greens or Iggy Spinach     CONDIMENTS/ SAUCES/SPREADS/ SPICES  [] Maciej Hartley's Magic Seasonings - Regular or Salt Free   [] Jaime Edward's Sauces - All Flavors   [] Laughing Cow Light - All Flavors   [] Dash Salt-Free Marinade - All Flavors   [] Ivan & Florida's: Heart Smart Pasta Sauces   [] Tabasco     SALAD DRESSINGS  [] Sandrita's Naturals: Lite Honey Mustard   [] Saldaña's Own: Lighten Up Salad Dressing - All Varieties   [] OPA Greek Yogurt Dressings - Ranch, Blue o Cheese, Caesar, Feta Dill     SWEETENERS  [] Sweet [place] Sweetener   [] Swerve   [] Truvia     BEVERAGES  [] Coconut Water   [] Crystal Light PURE - All Flavors   [] Honest Tea: Just Green Tea, Unsweetened   [] Kombucha Tea   [] [place]   [] [place] Sisters Bloody Mame Mix   [] Metromint - Zero-Sugar; All Natural Flavored   [] Tory - Plain or Flavored   [] Genoveva Yan   [] Giovaniaz - Zero-Sugar, All-Natural, Sparkling Tea   [] Tea Bags: Any Brand - e.g. Darlyn, Yogi, Tazzo, Celestial   [] V8 100% Vegetable Juice    [] Vitamin Water Zero   [] Water   [] Zevia - Stevia Sweetened Soft Drink     BEER/IVAN/LIQUORS  []Severino's Premier Light 64 Calories   [] Bud Select - 55 Calories   [] [facility] Bloody Mame Mix   [] Argueta Genuine Draft - 64 Calories   [] Red or White Wine - All Varieties     CEREALS: HOT/COLD   [] Annmarie's Puffin's Original Cereal  [] Juventino's Mill Oat Bran Hot Cereal - Cracked Wheat, Multi-Grain  [] Kashi GoLean Cereal  [] Kashi GoLean Hot Cereal packets - Vanilla; Honey Cinnamon  [] Becka's Special K Protein Cereal  [] Sara's Steel Cut Hungarian Oatmeal  [] Nature's Path Smart Bran  [] Voodoo Instant Oatmeal packet, Original  [] Voodoo Old Fashioned Voodoo Oats  [] Uncle Neptali's Whole Wheat & Flaxseed Original Cereal

## 2022-08-31 NOTE — PROGRESS NOTES
"The patient location is: [place]  The chief complaint leading to consultation is: T2DM and Non-alcoholic Fatty Liver     Visit type: audiovisual    Face to Face time with patient: 90 min  120 minutes of total time spent on the encounter, which includes face to face time and non-face to face time preparing to see the patient (eg, review of tests), Obtaining and/or reviewing separately obtained history, Documenting clinical information in the electronic or other health record, Independently interpreting results (not separately reported) and communicating results to the patient/family/caregiver, or Care coordination (not separately reported).       Each patient to whom he or she provides medical services by telemedicine is:  (1) informed of the relationship between the physician and patient and the respective role of any other health care provider with respect to management of the patient; and (2) notified that he or she may decline to receive medical services by telemedicine and may withdraw from such care at any time.    Nutrition Assessment for Initial Medical Nutrition Therapy      Reason for MNT visit: Pt in for education and nutrition counseling regarding T2DM, Fatty Liver, and Obesity,       ASSESSMENT    Age: 42 y.o.  Wt at home: 254#s   Goal Wt: 165#   Wt Readings from Last 1 Encounters:   06/21/22 119.3 kg (263 lb)     Ht: 5'8"   Ht Readings from Last 1 Encounters:   06/21/22 5' 8" (1.727 m)     BMI: 38.6  BMI Readings from Last 1 Encounters:   06/21/22 39.99 kg/m²       Goals/Outcomes:   What would you like to accomplish in regards to your health? Patient states she was dx with fatty liver in July 2021. Pt states she lost about 50#  over the past year and is at a stand still now,  but would like to conitue to lose wt and get off meds. A1C is going down. Pt also stated she has Fibrosis-stage 2 and HTN. Short term goal wt is to lose about 25#s in 6 months, and 40 within 10 months. Long term goal wt is 165#s. "     Pt states she has goiter and has to avoid a lot of meat and bread because its hard to swallow     Clinical signs/symptoms: Nausea, borderline gastroparesis, Joint and back pain, constipation, diarriah, IBS, difficulty swallowing certain foods.      Lifestyle Influences  Support System: 8 back surgeries so is limited with exercise, may have to have neck surgery     Medical History:   Past Medical History:   Diagnosis Date    Anxiety     Arthritis     Asthma     Breast cyst     Chronic back pain     Colon polyp     Depression     Diabetes mellitus     Elevated liver enzymes     Fibrocystic breast     Fibromyalgia     GERD (gastroesophageal reflux disease)     History of Chiari malformation     History of colitis     History of gastritis     Hypertension     Hypokalemia     IBS (irritable bowel syndrome)     Insomnia     MVP (mitral valve prolapse)     HERNANDEZ (nonalcoholic steatohepatitis) 6/23/2021    Neuropathy     Postmenopausal HRT (hormone replacement therapy)     Seasonal allergies     Sleep apnea with use of continuous positive airway pressure (CPAP)     Spondylitis     Thyroid nodule      Problem List             Resolved    Allergic rhinitis         Anxiety and depression         Asthma         Fibromyalgia         Gastroesophageal reflux disease         Hypertension         Irritable bowel syndrome (IBS)         Nonspecific colitis         Hyperlipidemia         Chronic back pain         B12 deficiency anemia         Type 2 diabetes mellitus without complication         Spondylosis of thoracic region without myelopathy or radiculopathy         Lumbosacral radiculopathy         Dysphagia         Lumbar radiculopathy         Class 3 severe obesity in adult         Liver fibrosis, F2, biopsy proven 6/2021         HERNANDEZ (nonalcoholic steatohepatitis)            Medications:   Current Outpatient Medications:     albuterol (PROVENTIL/VENTOLIN HFA) 90 mcg/actuation inhaler, USE 2 PUFFS EVERY 6 HOURS AS NEEDED FOR  WHEEZING, Disp: 18 g, Rfl: 1    atorvastatin (LIPITOR) 10 MG tablet, Take 1 tablet (10 mg total) by mouth every evening., Disp: 30 tablet, Rfl: 6    budesonide (PULMICORT) 0.5 mg/2 mL nebulizer solution, USE 2 ML(0.5 MG) VIA NEBULIZER TWICE DAILY AS NEEDED, Disp: 60 mL, Rfl: 1    cetirizine (ZYRTEC) 10 MG tablet, Take 1 tablet (10 mg total) by mouth once daily., Disp: 90 tablet, Rfl: 3    cyanocobalamin 1,000 mcg/mL injection, INJECT 1 ML IN THE MUSCLE EVERY 30 DAYS, Disp: 10 mL, Rfl: 1    dexlansoprazole (DEXILANT) 60 mg capsule, Take 1 capsule (60 mg total) by mouth before breakfast., Disp: 30 capsule, Rfl: 11    EPINEPHrine (EPIPEN) 0.3 mg/0.3 mL AtIn, as needed., Disp: , Rfl:     famotidine (PEPCID) 40 MG tablet, Take 1 tablet (40 mg total) by mouth every evening., Disp: 60 tablet, Rfl: 5    gabapentin (NEURONTIN) 600 MG tablet, TAKE 2 TABLETS(1200 MG) BY MOUTH THREE TIMES DAILY, Disp: 540 tablet, Rfl: 1    ketorolac (TORADOL) 10 mg tablet, Take by mouth daily as needed. TAKE 1 TABLET BY MOUTH EVERY 6 HOURS FOR 5 DAYS, Disp: , Rfl:     levalbuterol (XOPENEX) 1.25 mg/3 mL nebulizer solution, Take 3 mLs (1.25 mg total) by nebulization every 4 (four) hours as needed for Wheezing. Rescue, Disp: 30 mL, Rfl: 1    linaGLIPtin (TRADJENTA) 5 mg Tab tablet, Take 1 tablet (5 mg total) by mouth every evening., Disp: 30 tablet, Rfl: 6    lisinopriL (PRINIVIL,ZESTRIL) 2.5 MG tablet, Take 1 tablet (2.5 mg total) by mouth once daily., Disp: 90 tablet, Rfl: 3    metoprolol succinate (TOPROL-XL) 50 MG 24 hr tablet, Take 1 tablet (50 mg total) by mouth every evening., Disp: 90 tablet, Rfl: 3    montelukast (SINGULAIR) 10 mg tablet, Take 1 tablet (10 mg total) by mouth every evening., Disp: 90 tablet, Rfl: 3    morphine 100 mg/100 mL (1 mg/mL) infusion, 3.6 mcg/kg/hr continuous. Intrathecal administration per pain pump, Disp: , Rfl:     ondansetron (ZOFRAN) 8 MG tablet, Take 1 tablet (8 mg total) by mouth every 8 (eight) hours.  (Patient taking differently: Take 8 mg by mouth every 8 (eight) hours as needed.), Disp: 6 tablet, Rfl: 1    oxyCODONE (ROXICODONE) 10 mg Tab immediate release tablet, TAKE 1 TABLET BY MOUTH EVERY 6 HOURS FOR 3 DAYS, Disp: , Rfl:     potassium chloride SA (K-DUR,KLOR-CON) 20 MEQ tablet, TAKE 1 TABLET(20 MEQ) BY MOUTH EVERY EVENING, Disp: 90 tablet, Rfl: 3    promethazine (PHENERGAN) 25 MG tablet, Take 25 mg by mouth every 8 (eight) hours as needed., Disp: , Rfl:     sodium chloride 0.9% 0.9 % SolP with bupivacaine 0.5% (5 mg/ml) 0.5 % (5 mg/mL) Soln 0.1 %, by Epidural route continuous., Disp: , Rfl:     Supplements: None     Past diets/eating patterns: calorie counting     Food allergies  intolerances: dairy and seafood     Labs:  Reviewed   Hemoglobin A1C   Date Value Ref Range Status   06/01/2022 6.0 (H) 4.0 - 5.6 % Final     Comment:     ADA Screening Guidelines:  5.7-6.4%  Consistent with prediabetes  >or=6.5%  Consistent with diabetes    High levels of fetal hemoglobin interfere with the HbA1C  assay. Heterozygous hemoglobin variants (HbS, HgC, etc)do  not significantly interfere with this assay.   However, presence of multiple variants may affect accuracy.     11/13/2021 6.4 (H) 0.0 - 5.6 % Final     Comment:     Reference Interval:  5.0 - 5.6 Normal   5.7 - 6.4 High Risk   > 6.5 Diabetic      Hgb A1c results are standardized based on the (NGSP) National   Glycohemoglobin Standardization Program.      Hemoglobin A1C levels are related to mean serum/plasma glucose   during the preceding 2-3 months.        04/06/2021 6.6 (H) 4.0 - 5.6 % Final     Comment:     ADA Screening Guidelines:  5.7-6.4%  Consistent with prediabetes  >or=6.5%  Consistent with diabetes    High levels of fetal hemoglobin interfere with the HbA1C  assay. Heterozygous hemoglobin variants (HbS, HgC, etc)do  not significantly interfere with this assay.   However, presence of multiple variants may affect accuracy.       Cholesterol   Date Value  Ref Range Status   06/01/2022 135 120 - 199 mg/dL Final     Comment:     The National Cholesterol Education Program (NCEP) has set the  following guidelines (reference ranges) for Cholesterol:  Optimal.....................<200 mg/dL  Borderline High.............200-239 mg/dL  High........................> or = 240 mg/dL     04/06/2021 165 120 - 199 mg/dL Final     Comment:     The National Cholesterol Education Program (NCEP) has set the  following guidelines (reference ranges) for Cholesterol:  Optimal.....................<200 mg/dL  Borderline High.............200-239 mg/dL  High........................> or = 240 mg/dL     06/24/2020 172 120 - 199 mg/dL Final     Comment:     The National Cholesterol Education Program (NCEP) has set the  following guidelines (reference ranges) for Cholesterol:  Optimal.....................<200 mg/dL  Borderline High.............200-239 mg/dL  High........................> or = 240 mg/dL       Triglycerides   Date Value Ref Range Status   06/01/2022 126 30 - 150 mg/dL Final     Comment:     The National Cholesterol Education Program (NCEP) has set the  following guidelines (reference values) for triglycerides:  Normal......................<150 mg/dL  Borderline High.............150-199 mg/dL  High........................200-499 mg/dL     04/06/2021 172 (H) 30 - 150 mg/dL Final     Comment:     The National Cholesterol Education Program (NCEP) has set the  following guidelines (reference values) for triglycerides:  Normal......................<150 mg/dL  Borderline High.............150-199 mg/dL  High........................200-499 mg/dL     06/24/2020 152 (H) 30 - 150 mg/dL Final     Comment:     The National Cholesterol Education Program (NCEP) has set the  following guidelines (reference values) for triglycerides:  Normal......................<150 mg/dL  Borderline High.............150-199 mg/dL  High........................200-499 mg/dL       HDL   Date Value Ref Range Status    06/01/2022 34 (L) 40 - 75 mg/dL Final     Comment:     The National Cholesterol Education Program (NCEP) has set the  following guidelines (reference values) for HDL Cholesterol:  Low...............<40 mg/dL  Optimal...........>60 mg/dL     04/06/2021 34 (L) 40 - 75 mg/dL Final     Comment:     The National Cholesterol Education Program (NCEP) has set the  following guidelines (reference values) for HDL Cholesterol:  Low...............<40 mg/dL  Optimal...........>60 mg/dL     06/24/2020 38 (L) 40 - 75 mg/dL Final     Comment:     The National Cholesterol Education Program (NCEP) has set the  following guidelines (reference values) for HDL Cholesterol:  Low...............<40 mg/dL  Optimal...........>60 mg/dL       LDL Calculated   Date Value Ref Range Status   10/15/2015 95 MG/DL Final     Comment:     Adult levels in terms of risk for coronary heart disease:  Optimal:_less than 100 MG/DL  Near to above Optimal:_100-129 MG/DL  Borderline High:_130-159 MG/DL  High:_160-189 MG/DL  Very High:_greater than 190 MG/DL       LDL Cholesterol   Date Value Ref Range Status   06/01/2022 75.8 63.0 - 159.0 mg/dL Final     Comment:     The National Cholesterol Education Program (NCEP) has set the  following guidelines (reference values) for LDL Cholesterol:  Optimal.......................<130 mg/dL  Borderline High...............130-159 mg/dL  High..........................160-189 mg/dL  Very High.....................>190 mg/dL     04/06/2021 96.6 63.0 - 159.0 mg/dL Final     Comment:     The National Cholesterol Education Program (NCEP) has set the  following guidelines (reference values) for LDL Cholesterol:  Optimal.......................<130 mg/dL  Borderline High...............130-159 mg/dL  High..........................160-189 mg/dL  Very High.....................>190 mg/dL     06/24/2020 103.6 63.0 - 159.0 mg/dL Final     Comment:     The National Cholesterol Education Program (NCEP) has set the  following guidelines  (reference values) for LDL Cholesterol:  Optimal.......................<130 mg/dL  Borderline High...............130-159 mg/dL  High..........................160-189 mg/dL  Very High.....................>190 mg/dL       HDL/Cholesterol Ratio   Date Value Ref Range Status   06/01/2022 25.2 20.0 - 50.0 % Final   04/06/2021 20.6 20.0 - 50.0 % Final   06/24/2020 22.1 20.0 - 50.0 % Final     Non-HDL Cholesterol   Date Value Ref Range Status   06/01/2022 101 mg/dL Final     Comment:     Risk category and Non-HDL cholesterol goals:  Coronary heart disease (CHD)or equivalent (10-year risk of CHD >20%):  Non-HDL cholesterol goal     <130 mg/dL  Two or more CHD risk factors and 10-year risk of CHD <= 20%:  Non-HDL cholesterol goal     <160 mg/dL  0 to 1 CHD risk factor:  Non-HDL cholesterol goal     <190 mg/dL     04/06/2021 131 mg/dL Final     Comment:     Risk category and Non-HDL cholesterol goals:  Coronary heart disease (CHD)or equivalent (10-year risk of CHD >20%):  Non-HDL cholesterol goal     <130 mg/dL  Two or more CHD risk factors and 10-year risk of CHD <= 20%:  Non-HDL cholesterol goal     <160 mg/dL  0 to 1 CHD risk factor:  Non-HDL cholesterol goal     <190 mg/dL     06/24/2020 134 mg/dL Final     Comment:     Risk category and Non-HDL cholesterol goals:  Coronary heart disease (CHD)or equivalent (10-year risk of CHD >20%):  Non-HDL cholesterol goal     <130 mg/dL  Two or more CHD risk factors and 10-year risk of CHD <= 20%:  Non-HDL cholesterol goal     <160 mg/dL  0 to 1 CHD risk factor:  Non-HDL cholesterol goal     <190 mg/dL       Vitamin B-12   Date Value Ref Range Status   06/07/2021 841 210 - 950 pg/mL Final     Comment:     Patients taking very high Biotin doses of >300 mcg/day may   cause a positive bias in this assay.     04/06/2021 556 210 - 950 pg/mL Final   06/24/2020 >1000 (H) 210 - 950 pg/mL Final     Comment:     Patients taking very high Biotin doses of >300 mcg/day may   cause a positive bias  in this assay.       TSH   Date Value Ref Range Status   06/01/2022 0.873 0.400 - 4.000 uIU/mL Final   06/01/2022 0.873 0.400 - 4.000 uIU/mL Final       Sleep Hygiene: Average 4 hours of sleep  - disturbed sleep     Stress Level: very high, lost daughter a year ago, anxiety     Current Activity Level: moves around a lot at home because can't sit for long, but needs help bathing and doing house work. No exercise     Dining out: not often, maybe when she goes to dr. Russo     Beverages: drinks water all day (about 67 oz/day) and one diet Dr. Pepper per day     Alcohol: smoker, no alcohol - smokes 1/2 to 1 pack per day     Meal preparation/shopping: self    Typical food recall: Reviewed and noted during consultation. Does not like fish, can't eat ground meat due to   Bakes most food, no frying   Wakes: 4 am   BKF: skips breakfast most days  - but today had a frozen biscuit and frozen pork sausage around 8am    Lunch: 11am-12pm  - turkey sandwich, honey wheat or white bread, hammer, 2 - 3 slices of turkey, banana   Dinner:  5pm - Pork chops, sirloin, beef stew, potatoes, chicken with EVOO and flour gravy, stuffed bell peppers, rice, ground meat, almond oats cereal, almond milk, southwest chili, ranch mix and taco mix, red beans, corn bread    Snacks:  pretzels with PB, nuts, yoplait yogurt   Bed: 8:30/9:00 but may not fall asleep until 12pm     Food likes: chicken, fruit, corn, broccoli, carrots, almond milk, oatmeal, scammbeled eggs   Food Dislikes: not many veggies, green beans,      Patient motivation, anticipated barriers, expected compliance: Patient is motivated and has verbalized understanding and intent to comply.  Pt is willing to increase fruits and veggies, work on improving her meals and meal planning, as well as start a fiber supplement to see if it will help with her stomach/IBS.    DIAGNOSIS   Problem: Overweight/Obesity   Etiology: RT physical inactivity and undesirable food choices   Signs/Symptoms: AEB  physical limitations, weight and food recall     BMR Calculation: 1,861   LAMBERTO: 2,233     INTERVENTION  Nutrition prescription: Estimated energy requirements:   Calories: 1,600 - 1,700   Protein: 150 - 160 grams   Carbohydrates: 140 - 150   Total Fat: 40 - 50   Focus on plant-based, heart healthy fats  Baseline for fluids: 120 - 130 oz       Recommendations & Goals:  Patient goals and recommendations are tailored to the specific patient's needs, readiness to change, lifestyle, culture, skills, resources, & abilities. Strategies to help achieve these nutrition-related goals were discussed which can include but are not limited to SMART goal setting & mindful eating.     Aim for a minimum of 7 hours sleep   Exercise 60 minutes most days  Eat breakfast within 1-2 hours of waking up  Try not to skip any meals or snacks, not going more than 3-4 hours without eating.   At each meal and snack, try to include a source of fiber + lean protein + healthy fat.     Written Materials Provided  These resources are intended to assist the patient in making it easier to choose recommended options when eating out & to identify better-for-you brands at the grocery store:    Meal Planning Guide with recommendations discussed along with portion sizes and a customized meal plan   Eat Fit odalis card as a reminder to download the odalis to ones smart phone which provides: current Eat Fit partners, approved menu options, food labels for carb counting, & recipes   On-the-Go Food Guide  Brand Specific Eat Fit Grocery Product list   RD contact information- for patient to contact regarding any questions, needs, and/or concerns that may arise     MONITORING & EVALUATION    Communicated with healthcare provider    Documented plan for referral to appropriate agency/healthcare provider as needed    Comprehension: good     Motivation to change: moderate    Follow-up: 2 months     Counseling time: 2 Hours

## 2022-09-07 ENCOUNTER — HOSPITAL ENCOUNTER (OUTPATIENT)
Dept: RADIOLOGY | Facility: HOSPITAL | Age: 42
Discharge: HOME | End: 2022-09-07
Attending: INTERNAL MEDICINE
Payer: COMMERCIAL

## 2022-09-07 DIAGNOSIS — E04.2 MULTINODULAR GOITER: ICD-10-CM

## 2022-09-07 DIAGNOSIS — R94.6 ABNORMAL THYROID FUNCTION TEST: ICD-10-CM

## 2022-09-07 PROCEDURE — 76536 US EXAM OF HEAD AND NECK: CPT | Mod: 26,,, | Performed by: RADIOLOGY

## 2022-09-07 PROCEDURE — 76536 US SOFT TISSUE HEAD NECK THYROID: ICD-10-PCS | Mod: 26,,, | Performed by: RADIOLOGY

## 2022-09-07 PROCEDURE — 76536 US EXAM OF HEAD AND NECK: CPT | Mod: TC,PO

## 2022-09-12 ENCOUNTER — OFFICE VISIT (OUTPATIENT)
Dept: ENDOCRINOLOGY | Facility: CLINIC | Age: 42
End: 2022-09-12
Payer: COMMERCIAL

## 2022-09-12 VITALS
WEIGHT: 251 LBS | SYSTOLIC BLOOD PRESSURE: 126 MMHG | DIASTOLIC BLOOD PRESSURE: 86 MMHG | BODY MASS INDEX: 38.04 KG/M2 | HEIGHT: 68 IN | RESPIRATION RATE: 18 BRPM | HEART RATE: 88 BPM

## 2022-09-12 DIAGNOSIS — E04.2 MULTINODULAR GOITER: Primary | ICD-10-CM

## 2022-09-12 DIAGNOSIS — R13.10 DYSPHAGIA, UNSPECIFIED TYPE: ICD-10-CM

## 2022-09-12 PROCEDURE — 1159F MED LIST DOCD IN RCRD: CPT | Mod: CPTII,S$GLB,, | Performed by: INTERNAL MEDICINE

## 2022-09-12 PROCEDURE — 99213 PR OFFICE/OUTPT VISIT, EST, LEVL III, 20-29 MIN: ICD-10-PCS | Mod: S$GLB,,, | Performed by: INTERNAL MEDICINE

## 2022-09-12 PROCEDURE — 99999 PR PBB SHADOW E&M-EST. PATIENT-LVL V: ICD-10-PCS | Mod: PBBFAC,,, | Performed by: INTERNAL MEDICINE

## 2022-09-12 PROCEDURE — 3079F DIAST BP 80-89 MM HG: CPT | Mod: CPTII,S$GLB,, | Performed by: INTERNAL MEDICINE

## 2022-09-12 PROCEDURE — 3044F HG A1C LEVEL LT 7.0%: CPT | Mod: CPTII,S$GLB,, | Performed by: INTERNAL MEDICINE

## 2022-09-12 PROCEDURE — 99999 PR PBB SHADOW E&M-EST. PATIENT-LVL V: CPT | Mod: PBBFAC,,, | Performed by: INTERNAL MEDICINE

## 2022-09-12 PROCEDURE — 99213 OFFICE O/P EST LOW 20 MIN: CPT | Mod: S$GLB,,, | Performed by: INTERNAL MEDICINE

## 2022-09-12 PROCEDURE — 1159F PR MEDICATION LIST DOCUMENTED IN MEDICAL RECORD: ICD-10-PCS | Mod: CPTII,S$GLB,, | Performed by: INTERNAL MEDICINE

## 2022-09-12 PROCEDURE — 3066F NEPHROPATHY DOC TX: CPT | Mod: CPTII,S$GLB,, | Performed by: INTERNAL MEDICINE

## 2022-09-12 PROCEDURE — 3061F PR NEG MICROALBUMINURIA RESULT DOCUMENTED/REVIEW: ICD-10-PCS | Mod: CPTII,S$GLB,, | Performed by: INTERNAL MEDICINE

## 2022-09-12 PROCEDURE — 3066F PR DOCUMENTATION OF TREATMENT FOR NEPHROPATHY: ICD-10-PCS | Mod: CPTII,S$GLB,, | Performed by: INTERNAL MEDICINE

## 2022-09-12 PROCEDURE — 4010F ACE/ARB THERAPY RXD/TAKEN: CPT | Mod: CPTII,S$GLB,, | Performed by: INTERNAL MEDICINE

## 2022-09-12 PROCEDURE — 3044F PR MOST RECENT HEMOGLOBIN A1C LEVEL <7.0%: ICD-10-PCS | Mod: CPTII,S$GLB,, | Performed by: INTERNAL MEDICINE

## 2022-09-12 PROCEDURE — 4010F PR ACE/ARB THEARPY RXD/TAKEN: ICD-10-PCS | Mod: CPTII,S$GLB,, | Performed by: INTERNAL MEDICINE

## 2022-09-12 PROCEDURE — 3074F PR MOST RECENT SYSTOLIC BLOOD PRESSURE < 130 MM HG: ICD-10-PCS | Mod: CPTII,S$GLB,, | Performed by: INTERNAL MEDICINE

## 2022-09-12 PROCEDURE — 3008F PR BODY MASS INDEX (BMI) DOCUMENTED: ICD-10-PCS | Mod: CPTII,S$GLB,, | Performed by: INTERNAL MEDICINE

## 2022-09-12 PROCEDURE — 3008F BODY MASS INDEX DOCD: CPT | Mod: CPTII,S$GLB,, | Performed by: INTERNAL MEDICINE

## 2022-09-12 PROCEDURE — 3061F NEG MICROALBUMINURIA REV: CPT | Mod: CPTII,S$GLB,, | Performed by: INTERNAL MEDICINE

## 2022-09-12 PROCEDURE — 3079F PR MOST RECENT DIASTOLIC BLOOD PRESSURE 80-89 MM HG: ICD-10-PCS | Mod: CPTII,S$GLB,, | Performed by: INTERNAL MEDICINE

## 2022-09-12 PROCEDURE — 3074F SYST BP LT 130 MM HG: CPT | Mod: CPTII,S$GLB,, | Performed by: INTERNAL MEDICINE

## 2022-09-12 NOTE — PROGRESS NOTES
CHIEF COMPLAINT: Multinodular Goiter   42 y.o. old being seen as a f/u. Saw PA in [place]. In 2019 was found to have thyroid nodule. No XRT to head/neck. No History of FNA. Has occasional dysphagia. Scheduled to see GI next month.  She does have GERD. No palpitations. No tremors.       PAST MEDICAL HISTORY/PAST SURGICAL HISTORY:  Reviewed in [facility]    SOCIAL HISTORY: Reviewed in [facility]    FAMILY HISTORY:  Mother and daughters with Hypothyroidism.     MEDICATIONS/ALLERGIES: The patient's MedCard has been updated and reviewed.              PE:    GENERAL: Well developed, well nourished.  NECK: Supple, trachea midline, thyroid irregular but no palpable nodules.   CHEST: Resp even and unlabored, CTA bilateral.  CARDIAC: RRR, S1, S2 heard, no murmurs, rubs, S3, or S4  SKIN: no acanthosis nigracans.      LABS/Radiology       Latest Reference Range & Units 09/07/22 09:53   TSH 0.400 - 4.000 uIU/mL 0.537       US SOFT TISSUE HEAD NECK THYROID     CLINICAL HISTORY:  Nontoxic multinodular goiter     TECHNIQUE:  Ultrasound of the thyroid and cervical lymph nodes was performed.     COMPARISON:  09/25/2021     FINDINGS:  The right lobe of thyroid gland measures 6.1 x 2.1 x 1.9 cm in size.  The left lobe the thyroid gland measures 6.1 x 2.4 x 1.7cm in size.  This gives an approximate volume of on the right of 12.3cc and an approximate volume on the left of 12.8 cc for a total approximate volume of 25.1 cc.     A mid right lobe nodule is noted with solid and cystic components 1.0 x 1.0 x 0.8 cm difficult to ideally measure but that appear to measure 1.0 x 1.0 x 0.8 cm on the prior from 09/25/2021.  This is isoechoic and poorly defined wider than tall.  No microcalcifications are noted     Inferior to this a hypoechoic well-circumscribed nodule is noted without microcalcifications that is predominantly solid wider than tall measuring 10 x 9 x 8 mm versus is 9 x 6 x 8 mm on the prior, a small amount of growth is suspected.  Continued  follow-up is suggested by TI-RADS.  A 2nd opinion was obtained     A hypoechoic region is noted at the upper pole of the left lobe of the thyroid gland medially difficult to differentiate from the adjacent normal thyroid gland slightly taller than wide of 6 mm that is thought to be seen in retrospect on the prior without worrisome change a does not meet criteria for fine-needle aspiration or biopsy.  A solid and cystic hypoechoic nodule is noted at the mid lobe of the thyroid gland deeply located 9 mm without significant change since the prior without obvious microcalcifications.     A 7 mm nodule is noted at the lower left lobe of the thyroid gland possibly slightly larger a newly identified since the prior without obvious microcalcifications as wide as tall.  Continued follow-up for size stability would be suggested.     Impression:     1. Hypoechoic nodule in the mid to lower right lobe of the thyroid gland TI-RADS 4 that is predominantly solid of 1 cm and well-circumscribed hypoechoic that is suspected to have grown slightly since the prior.  By TI-RADS follow-up for size stability would be suggested by size.      ASSESSMENT/PLAN:  1. Multinodular Goiter- no XRT.  Ultrasound shows no significant change from before.  There may be some mild change in the right nodule but difficult to measure.  At this point, I would repeat ultrasound in 1 year.      2. Abnormal TFTs-suppressed TSH.- TSH now within normal limits.    3. Dysphagia-scheduled to see GI.  Has had an endoscopy in the past.  She does have GERD      FOLLOWUP  F/U 1 yr with TSH and Thyroid Ultrasound.

## 2022-09-15 ENCOUNTER — HOSPITAL ENCOUNTER (OUTPATIENT)
Dept: RADIOLOGY | Facility: HOSPITAL | Age: 42
Discharge: HOME | End: 2022-09-15
Attending: ORTHOPAEDIC SURGERY
Payer: COMMERCIAL

## 2022-09-15 DIAGNOSIS — G95.9 DISEASE OF SPINAL CORD, UNSPECIFIED: ICD-10-CM

## 2022-09-15 DIAGNOSIS — G89.4 CHRONIC PAIN SYNDROME: ICD-10-CM

## 2022-09-15 DIAGNOSIS — M54.17 RADICULOPATHY, LUMBOSACRAL REGION: ICD-10-CM

## 2022-09-15 PROCEDURE — 72141 MRI NECK SPINE W/O DYE: CPT | Mod: TC,PO

## 2022-09-15 PROCEDURE — 72146 MRI CHEST SPINE W/O DYE: CPT | Mod: TC,PO

## 2022-09-15 PROCEDURE — 72148 MRI LUMBAR SPINE W/O DYE: CPT | Mod: TC,PO

## 2022-09-25 ENCOUNTER — OFFICE VISIT (OUTPATIENT)
Dept: URGENT CARE | Facility: CLINIC | Age: 42
End: 2022-09-25
Payer: COMMERCIAL

## 2022-09-25 VITALS
SYSTOLIC BLOOD PRESSURE: 117 MMHG | RESPIRATION RATE: 16 BRPM | HEIGHT: 68 IN | OXYGEN SATURATION: 96 % | WEIGHT: 251.13 LBS | HEART RATE: 73 BPM | DIASTOLIC BLOOD PRESSURE: 83 MMHG | TEMPERATURE: 98 F | BODY MASS INDEX: 38.06 KG/M2

## 2022-09-25 DIAGNOSIS — R30.0 DYSURIA: ICD-10-CM

## 2022-09-25 DIAGNOSIS — M54.9 BACK PAIN, UNSPECIFIED BACK LOCATION, UNSPECIFIED BACK PAIN LATERALITY, UNSPECIFIED CHRONICITY: Primary | ICD-10-CM

## 2022-09-25 LAB
BILIRUB UR QL STRIP: NEGATIVE
COLOR URINE: NORMAL
GLUCOSE UR QL STRIP: NEGATIVE
KETONES UR QL STRIP: NEGATIVE
LEUKOCYTE ESTERASE UR QL STRIP: NEGATIVE
PH, POC UA: 5.5 (ref 5–8)
POC BLOOD, URINE: NEGATIVE
POC NITRATES, URINE: NEGATIVE
PROT UR QL STRIP: NEGATIVE
SP GR UR STRIP: 1.01 (ref 1–1.03)
UROBILINOGEN UR STRIP-ACNC: NORMAL (ref 0.1–1.1)

## 2022-09-25 PROCEDURE — 3079F DIAST BP 80-89 MM HG: CPT | Mod: CPTII,S$GLB,, | Performed by: PHYSICIAN ASSISTANT

## 2022-09-25 PROCEDURE — 81003 URINALYSIS AUTO W/O SCOPE: CPT | Mod: QW,S$GLB,, | Performed by: PHYSICIAN ASSISTANT

## 2022-09-25 PROCEDURE — 3074F SYST BP LT 130 MM HG: CPT | Mod: CPTII,S$GLB,, | Performed by: PHYSICIAN ASSISTANT

## 2022-09-25 PROCEDURE — 1159F MED LIST DOCD IN RCRD: CPT | Mod: CPTII,S$GLB,, | Performed by: PHYSICIAN ASSISTANT

## 2022-09-25 PROCEDURE — 3008F BODY MASS INDEX DOCD: CPT | Mod: CPTII,S$GLB,, | Performed by: PHYSICIAN ASSISTANT

## 2022-09-25 PROCEDURE — 96372 THER/PROPH/DIAG INJ SC/IM: CPT | Mod: S$GLB,,, | Performed by: PHYSICIAN ASSISTANT

## 2022-09-25 PROCEDURE — 3044F PR MOST RECENT HEMOGLOBIN A1C LEVEL <7.0%: ICD-10-PCS | Mod: CPTII,S$GLB,, | Performed by: PHYSICIAN ASSISTANT

## 2022-09-25 PROCEDURE — 99214 OFFICE O/P EST MOD 30 MIN: CPT | Mod: 25,S$GLB,, | Performed by: PHYSICIAN ASSISTANT

## 2022-09-25 PROCEDURE — 81003 POCT URINALYSIS, DIPSTICK, AUTOMATED, W/O SCOPE: ICD-10-PCS | Mod: QW,S$GLB,, | Performed by: PHYSICIAN ASSISTANT

## 2022-09-25 PROCEDURE — 3066F NEPHROPATHY DOC TX: CPT | Mod: CPTII,S$GLB,, | Performed by: PHYSICIAN ASSISTANT

## 2022-09-25 PROCEDURE — 1160F PR REVIEW ALL MEDS BY PRESCRIBER/CLIN PHARMACIST DOCUMENTED: ICD-10-PCS | Mod: CPTII,S$GLB,, | Performed by: PHYSICIAN ASSISTANT

## 2022-09-25 PROCEDURE — 99214 PR OFFICE/OUTPT VISIT, EST, LEVL IV, 30-39 MIN: ICD-10-PCS | Mod: 25,S$GLB,, | Performed by: PHYSICIAN ASSISTANT

## 2022-09-25 PROCEDURE — 3061F NEG MICROALBUMINURIA REV: CPT | Mod: CPTII,S$GLB,, | Performed by: PHYSICIAN ASSISTANT

## 2022-09-25 PROCEDURE — 96372 PR INJECTION,THERAP/PROPH/DIAG2ST, IM OR SUBCUT: ICD-10-PCS | Mod: S$GLB,,, | Performed by: PHYSICIAN ASSISTANT

## 2022-09-25 PROCEDURE — 4010F PR ACE/ARB THEARPY RXD/TAKEN: ICD-10-PCS | Mod: CPTII,S$GLB,, | Performed by: PHYSICIAN ASSISTANT

## 2022-09-25 PROCEDURE — 4010F ACE/ARB THERAPY RXD/TAKEN: CPT | Mod: CPTII,S$GLB,, | Performed by: PHYSICIAN ASSISTANT

## 2022-09-25 PROCEDURE — 3066F PR DOCUMENTATION OF TREATMENT FOR NEPHROPATHY: ICD-10-PCS | Mod: CPTII,S$GLB,, | Performed by: PHYSICIAN ASSISTANT

## 2022-09-25 PROCEDURE — 3008F PR BODY MASS INDEX (BMI) DOCUMENTED: ICD-10-PCS | Mod: CPTII,S$GLB,, | Performed by: PHYSICIAN ASSISTANT

## 2022-09-25 PROCEDURE — 1159F PR MEDICATION LIST DOCUMENTED IN MEDICAL RECORD: ICD-10-PCS | Mod: CPTII,S$GLB,, | Performed by: PHYSICIAN ASSISTANT

## 2022-09-25 PROCEDURE — 3079F PR MOST RECENT DIASTOLIC BLOOD PRESSURE 80-89 MM HG: ICD-10-PCS | Mod: CPTII,S$GLB,, | Performed by: PHYSICIAN ASSISTANT

## 2022-09-25 PROCEDURE — 3061F PR NEG MICROALBUMINURIA RESULT DOCUMENTED/REVIEW: ICD-10-PCS | Mod: CPTII,S$GLB,, | Performed by: PHYSICIAN ASSISTANT

## 2022-09-25 PROCEDURE — 3074F PR MOST RECENT SYSTOLIC BLOOD PRESSURE < 130 MM HG: ICD-10-PCS | Mod: CPTII,S$GLB,, | Performed by: PHYSICIAN ASSISTANT

## 2022-09-25 PROCEDURE — 87086 URINE CULTURE/COLONY COUNT: CPT | Performed by: PHYSICIAN ASSISTANT

## 2022-09-25 PROCEDURE — 1160F RVW MEDS BY RX/DR IN RCRD: CPT | Mod: CPTII,S$GLB,, | Performed by: PHYSICIAN ASSISTANT

## 2022-09-25 PROCEDURE — 3044F HG A1C LEVEL LT 7.0%: CPT | Mod: CPTII,S$GLB,, | Performed by: PHYSICIAN ASSISTANT

## 2022-09-25 RX ORDER — NITROFURANTOIN 25; 75 MG/1; MG/1
100 CAPSULE ORAL 2 TIMES DAILY
Qty: 10 CAPSULE | Refills: 0 | Status: SHIPPED | OUTPATIENT
Start: 2022-09-25 | End: 2022-09-30

## 2022-09-25 RX ORDER — PHENAZOPYRIDINE HYDROCHLORIDE 100 MG/1
100 TABLET, FILM COATED ORAL 3 TIMES DAILY PRN
Qty: 10 TABLET | Refills: 0 | Status: SHIPPED | OUTPATIENT
Start: 2022-09-25 | End: 2022-10-10

## 2022-09-25 RX ORDER — KETOROLAC TROMETHAMINE 30 MG/ML
30 INJECTION, SOLUTION INTRAMUSCULAR; INTRAVENOUS
Status: COMPLETED | OUTPATIENT
Start: 2022-09-25 | End: 2022-09-25

## 2022-09-25 RX ADMIN — KETOROLAC TROMETHAMINE 30 MG: 30 INJECTION, SOLUTION INTRAMUSCULAR; INTRAVENOUS at 12:09

## 2022-09-25 NOTE — PROGRESS NOTES
"Subjective:       Patient ID: April D Rajat is a 42 y.o. female.    Vitals:  height is 5' 8" (1.727 m) and weight is 113.9 kg (251 lb 1.7 oz). Her temperature is 97.5 °F (36.4 °C). Her blood pressure is 117/83 and her pulse is 73. Her respiration is 16 and oxygen saturation is 96%.     Chief Complaint: Urinary Tract Infection    Patient came in today with the complaint of a possible UTI or kidney stones that started a few days ago. She stated that she has pain in her back right under her ribs, but also has on left. She also stated that she a history of UTI's and Kidney Stones. She has been taken steroids and AZO's for the symptoms. Patient also has a pump (I am assuming for baclofen) that recently malfunctioned due to MRI and she had to have reprogrammed on Friday.    Urinary Tract Infection   This is a new problem. The current episode started in the past 7 days. The problem has been gradually worsening. The quality of the pain is described as burning and shooting (shooting pain). The pain is at a severity of 4/10. The pain is mild. There has been no fever. She is Not sexually active. There is No history of pyelonephritis. Associated symptoms include hesitancy. Pertinent negatives include no behavior changes, chills, discharge, flank pain, frequency, hematuria, nausea, possible pregnancy, sweats, urgency, vomiting, weight loss, bubble bath use, constipation, rash or withholding. She has tried nothing for the symptoms. The treatment provided no relief. Her past medical history is significant for diabetes insipidus, hypertension, kidney stones and recurrent UTIs. There is no history of catheterization, diabetes mellitus, genitourinary reflux, a single kidney, STD, urinary stasis or a urological procedure.   Constitution: Negative for chills and fever.   Gastrointestinal:  Negative for nausea, vomiting and constipation.   Genitourinary:  Positive for dysuria. Negative for frequency, urgency, flank pain and hematuria. "   Skin:  Negative for rash.     Objective:      Physical Exam   Constitutional:  Non-toxic appearance.   HENT:   Head: Normocephalic and atraumatic.   Ears:   Right Ear: External ear normal.   Left Ear: External ear normal.   Eyes: Conjunctivae are normal. Right eye exhibits no discharge. Left eye exhibits no discharge. Extraocular movement intact   Cardiovascular: Normal rate, regular rhythm and normal heart sounds.   No murmur heard.  Pulmonary/Chest: Effort normal. No respiratory distress.   Abdominal: Normal appearance.   Musculoskeletal:      Comments: Tender to palpation bilateral lower thoracic area.  Pain is also exacerbated with twisting and turning motion.   Neurological: no focal deficit. She is alert.   Skin: Skin is warm, dry, not diaphoretic and not pale. jaundice  Psychiatric: Her behavior is normal. Mood, judgment and thought content normal.   Nursing note and vitals reviewed.      Assessment:       1. Back pain, unspecified back location, unspecified back pain laterality, unspecified chronicity    2. Dysuria          Plan:         Back pain, unspecified back location, unspecified back pain laterality, unspecified chronicity  -     POCT Urinalysis, Dipstick, Automated, W/O Scope    Results for orders placed or performed in visit on 09/25/22   POCT Urinalysis, Dipstick, Automated, W/O Scope   Result Value Ref Range    POC Blood, Urine Negative Negative    POC Bilirubin, Urine Negative Negative    POC Urobilinogen, Urine normal 0.1 - 1.1    POC Ketones, Urine Negative Negative    POC Protein, Urine Negative Negative    POC Nitrates, Urine Negative Negative    POC Glucose, Urine Negative Negative    pH, UA 5.5 5 - 8    POC Specific Gravity, Urine 1.010 1.003 - 1.029    POC Leukocytes, Urine Negative Negative    COLOR URINE LT. Yellow       -     Culture, Urine    Other orders  -     nitrofurantoin, macrocrystal-monohydrate, (MACROBID) 100 MG capsule; Take 1 capsule (100 mg total) by mouth 2 (two) times  daily. for 5 days  Dispense: 10 capsule; Refill: 0  -     phenazopyridine (PYRIDIUM) 100 MG tablet; Take 1 tablet (100 mg total) by mouth 3 (three) times daily as needed for Pain.  Dispense: 10 tablet; Refill: 0  -     ketorolac injection 30 mg     I have low suspicion for kidney stone as patient's pain is exacerbated with movement palpation.  I reviewed previous imaging and in recent years no kidney stones have been noted.  Patient recently (2 days ago) had to have pump reprogrammed.  It could still be malfunctioning causing her pain.  She is having dysuria however urinalysis reveals no evidence of infection.  Will send for culture and have printed a wait and see antibiotic in case the culture does come back positive.  Discussed with patient to hold off until receive culture results.

## 2022-09-26 LAB — BACTERIA UR CULT: NORMAL

## 2022-09-28 ENCOUNTER — TELEPHONE (OUTPATIENT)
Dept: URGENT CARE | Facility: CLINIC | Age: 42
End: 2022-09-28
Payer: COMMERCIAL

## 2022-09-29 ENCOUNTER — OFFICE VISIT (OUTPATIENT)
Dept: GASTROENTEROLOGY | Facility: CLINIC | Age: 42
End: 2022-09-29
Payer: COMMERCIAL

## 2022-09-29 VITALS — WEIGHT: 253.31 LBS | BODY MASS INDEX: 38.39 KG/M2 | HEIGHT: 68 IN

## 2022-09-29 DIAGNOSIS — R13.10 DYSPHAGIA, UNSPECIFIED TYPE: ICD-10-CM

## 2022-09-29 DIAGNOSIS — K21.9 GASTROESOPHAGEAL REFLUX DISEASE, UNSPECIFIED WHETHER ESOPHAGITIS PRESENT: Primary | ICD-10-CM

## 2022-09-29 PROCEDURE — 3066F NEPHROPATHY DOC TX: CPT | Mod: CPTII,S$GLB,, | Performed by: INTERNAL MEDICINE

## 2022-09-29 PROCEDURE — 99213 PR OFFICE/OUTPT VISIT, EST, LEVL III, 20-29 MIN: ICD-10-PCS | Mod: S$GLB,,, | Performed by: INTERNAL MEDICINE

## 2022-09-29 PROCEDURE — 99999 PR PBB SHADOW E&M-EST. PATIENT-LVL IV: CPT | Mod: PBBFAC,,, | Performed by: INTERNAL MEDICINE

## 2022-09-29 PROCEDURE — 3066F PR DOCUMENTATION OF TREATMENT FOR NEPHROPATHY: ICD-10-PCS | Mod: CPTII,S$GLB,, | Performed by: INTERNAL MEDICINE

## 2022-09-29 PROCEDURE — 3008F BODY MASS INDEX DOCD: CPT | Mod: CPTII,S$GLB,, | Performed by: INTERNAL MEDICINE

## 2022-09-29 PROCEDURE — 3008F PR BODY MASS INDEX (BMI) DOCUMENTED: ICD-10-PCS | Mod: CPTII,S$GLB,, | Performed by: INTERNAL MEDICINE

## 2022-09-29 PROCEDURE — 3061F PR NEG MICROALBUMINURIA RESULT DOCUMENTED/REVIEW: ICD-10-PCS | Mod: CPTII,S$GLB,, | Performed by: INTERNAL MEDICINE

## 2022-09-29 PROCEDURE — 3044F HG A1C LEVEL LT 7.0%: CPT | Mod: CPTII,S$GLB,, | Performed by: INTERNAL MEDICINE

## 2022-09-29 PROCEDURE — 4010F ACE/ARB THERAPY RXD/TAKEN: CPT | Mod: CPTII,S$GLB,, | Performed by: INTERNAL MEDICINE

## 2022-09-29 PROCEDURE — 99999 PR PBB SHADOW E&M-EST. PATIENT-LVL IV: ICD-10-PCS | Mod: PBBFAC,,, | Performed by: INTERNAL MEDICINE

## 2022-09-29 PROCEDURE — 3044F PR MOST RECENT HEMOGLOBIN A1C LEVEL <7.0%: ICD-10-PCS | Mod: CPTII,S$GLB,, | Performed by: INTERNAL MEDICINE

## 2022-09-29 PROCEDURE — 4010F PR ACE/ARB THEARPY RXD/TAKEN: ICD-10-PCS | Mod: CPTII,S$GLB,, | Performed by: INTERNAL MEDICINE

## 2022-09-29 PROCEDURE — 99213 OFFICE O/P EST LOW 20 MIN: CPT | Mod: S$GLB,,, | Performed by: INTERNAL MEDICINE

## 2022-09-29 PROCEDURE — 3061F NEG MICROALBUMINURIA REV: CPT | Mod: CPTII,S$GLB,, | Performed by: INTERNAL MEDICINE

## 2022-09-29 RX ORDER — PREDNISONE 50 MG/1
50 TABLET ORAL DAILY
COMMUNITY
Start: 2022-09-29 | End: 2022-10-10

## 2022-09-29 NOTE — PROGRESS NOTES
Subjective:       Patient ID: April D Rajat is a 42 y.o. female.    Chief Complaint: Gastroesophageal Reflux    Ms. Earl is here for follow up, after her EGD in May.  She is doing better since the dilation..  She is also found that she needs to avoid ground meat, as well as breads and rice (dry foods).  She is tolerating the Dexilant 60 mg a.m., and famotidine 40 mg p.m..  She is been able to lose a few more lb since the endoscopy in May.        See recent Upper GI endoscopy   Indications:           Pharyngeal phase dysphagia, Esophageal reflux,                          Nausea   Comorbidities        Hypertension, Morbid obesity (BMI 39), Hyperlipidemia, Asthma   Impression:    - Normal oropharynx.                          - Normal cricopharyngeus.                          - Normal esophagus.                          - Z-line regular, 39 cm from the incisors.                          - Patulous lower esophageal sphincter.                          - No endoscopic esophageal abnormality to explain patient's dysphagia. Esophagus dilated, 54 Fr.                          - Normal cardia.                          - Slight antritis. Biopsied.                          - Normal stomach otherwise.                          - Normal pylorus.                          - Normal examined duodenum.                          - Normal major papilla.   Recommendation:        - Discharge patient to home.                          - Follow an antireflux regimen.                          - Exercise and weight loss.                          - Continue present medications.                          - Use Dexilant (dexlansoprazole) 60 mg PO daily.                          - Use Pepcid (famotidine) 40 mg PO nightly.                          .   Earl Colunga MD   5/4/2022    RELIAPATH DIAGNOSIS:   STOMACH, PREPYLORIC, BIOPSY:   -  Benign antral mucosa showing mild chronic inactive gastritis with focal intestinal metaplasia        (complete type), negative for dysplasia.   -  No Helicobacter organisms identified by immunostain.       Wt Readings from Last 20 Encounters:  09/29/22 : 114.9 kg (253 lb 4.9 oz)  09/25/22 : 113.9 kg (251 lb 1.7 oz)  09/12/22 : 113.9 kg (250 lb 15.9 oz)  06/21/22 : 119.3 kg (263 lb)  05/19/22 : 119.3 kg (263 lb 1.9 oz)  05/03/22 : 117.9 kg (260 lb)  03/02/22 : 117.9 kg (260 lb)  03/02/22 : 117.9 kg (260 lb)  10/05/21 : 121.6 kg (268 lb)  07/22/21 : 128.4 kg (283 lb 1.1 oz)  07/06/21 : 128 kg (282 lb 3 oz)  06/16/21 : 131.5 kg (290 lb)  06/08/21 : 129.3 kg (285 lb 0.9 oz)  06/06/21 : 129.3 kg (285 lb)  05/25/21 : 129.3 kg (285 lb)  05/13/21 : 130.9 kg (288 lb 9.6 oz)  10/21/20 : 127 kg (280 lb)  09/24/20 : 131.5 kg (290 lb)  09/14/20 : 131.5 kg (290 lb)  09/10/20 : 131.6 kg (290 lb 3.2 oz)  ]    Review of Systems   Constitutional:  Negative for appetite change, fever and unexpected weight change.   HENT:  Positive for trouble swallowing (Improved after dilation.). Negative for mouth sores and sore throat.    Eyes: Negative.    Respiratory: Negative.  Negative for cough and choking.    Cardiovascular: Negative.  Negative for chest pain and leg swelling.   Gastrointestinal:  Negative for abdominal distention, abdominal pain, anal bleeding, blood in stool, constipation, diarrhea, nausea and vomiting.   Genitourinary: Negative.    Musculoskeletal: Negative.    Integumentary:  Negative for color change and rash. Negative.   Neurological: Negative.    Hematological:  Negative for adenopathy.   Psychiatric/Behavioral: Negative.         Objective:      Physical Exam  Vitals and nursing note reviewed.   Constitutional:       General: She is not in acute distress.     Appearance: Normal appearance. She is well-developed. She is obese.   HENT:      Head: Normocephalic.      Mouth/Throat:      Mouth: Mucous membranes are moist.      Pharynx: No oropharyngeal exudate.   Eyes:      General: No scleral icterus.  Neck:      Thyroid:  No thyromegaly.      Trachea: No tracheal deviation.   Cardiovascular:      Rate and Rhythm: Normal rate and regular rhythm.      Heart sounds: Normal heart sounds.   Pulmonary:      Effort: Pulmonary effort is normal.      Breath sounds: Normal breath sounds.   Abdominal:      General: Bowel sounds are normal. There is no distension.      Palpations: Abdomen is soft. There is no mass.      Tenderness: There is no abdominal tenderness. There is no guarding.   Lymphadenopathy:      Cervical: No cervical adenopathy.   Skin:     General: Skin is warm and dry.      Findings: No rash.   Neurological:      General: No focal deficit present.      Mental Status: She is alert and oriented to person, place, and time.   Psychiatric:         Mood and Affect: Mood normal.         Behavior: Behavior normal.       Assessment:         Gastroesophageal reflux disease, unspecified whether esophagitis present    Dysphagia, unspecified type    BMI 37.0-37.9, adult    Plan:        1. Continue meds the same.  2.  Continue diet and weight loss.   3. Next colonoscopy due in 2025.   4. RTC p.r.n. or in 1 year.

## 2022-10-10 ENCOUNTER — PATIENT MESSAGE (OUTPATIENT)
Dept: FAMILY MEDICINE | Facility: CLINIC | Age: 42
End: 2022-10-10

## 2022-10-10 ENCOUNTER — TELEPHONE (OUTPATIENT)
Dept: INFECTIOUS DISEASES | Facility: CLINIC | Age: 42
End: 2022-10-10

## 2022-10-10 ENCOUNTER — E-VISIT (OUTPATIENT)
Dept: FAMILY MEDICINE | Facility: CLINIC | Age: 42
End: 2022-10-10
Payer: COMMERCIAL

## 2022-10-10 DIAGNOSIS — J01.90 ACUTE RHINOSINUSITIS: Primary | ICD-10-CM

## 2022-10-10 PROCEDURE — 99422 PR E&M, ONLINE DIGIT, EST, < 7 DAYS,  11-20 MINS: ICD-10-PCS | Mod: S$GLB,,, | Performed by: NURSE PRACTITIONER

## 2022-10-10 PROCEDURE — 99422 OL DIG E/M SVC 11-20 MIN: CPT | Mod: S$GLB,,, | Performed by: NURSE PRACTITIONER

## 2022-10-10 NOTE — TELEPHONE ENCOUNTER
Patient called  [phone]    Patient states she needs an appointment with Dr Daniels  For surgery clearance, from an ID standpoint; as she has   Had a previous infection.    Patient states she is not having any issues or complaints    Surgery will be with Dr Edouard for pain pump replacement    Surgery is NOT scheduled as yet  ( Dr Edouard's office   Is working on [place] )     Please advise     PEGGY HAYES  10/10/22

## 2022-10-10 NOTE — TELEPHONE ENCOUNTER
spoke with patient to schedule appointment with Dr Daniels on 11/02/22 at 10:30 am   PEGGY Neal  [facility]   10/10/22

## 2022-10-10 NOTE — PROGRESS NOTES
Patient ID: Miri Earl is a 42 y.o. female.    Chief Complaint: No chief complaint on file.    The patient initiated a request through [facility] on 10/10/2022 for evaluation and management with a chief complaint of No chief complaint on file.     I evaluated the questionnaire submission on 10/10/22.    Ohs Peq Evisit Upper Respitatory/Cough Questionnaire    10/10/2022 11:11 AM CDT - Filed by Patient   Do you agree to participate in an E-Visit? Yes   If you have any of the following symptoms, please present to your local ER or call 911:  I acknowledge   What is the main issue that you would like for your doctor to address today? Sinus infection, ear ache   Are you able to take your vital signs? Yes   Systolic Blood Pressure: 124   Diastolic Blood Pressure: 73   Weight: 250   Height: 68   Pulse: 118   Temp: 98.5   Resp:    Pulse Ox: 96   What symptoms do you currently have?  Headache;  Nasal Congestion;  Sore throat   Have you had a fever? No   When did your symptoms first appear? 10/7/2022   In the last two weeks, have you been in close contact with someone who has COVID-19? No   In the last two weeks, have you worked or volunteered in a healthcare facility or as a ? Healthcare facilities include a hospital, medical or dental clinic, long-term care facility, or nursing home No   Do you live in a long-term care facility, nursing home, or homeless shelter? No   List what you have done or taken to help your symptoms. Allergy meds   How severe are your symptoms? Moderate   Have you taken an at home Covid test? Yes   What were the results? Negative   Have you been fully vaccinated for COVID? (2 Pfizer, 2 Moderna or 1 Terrell [employer] Terrell vaccine injections) No   Have you received your first dose of the Pfizer or Moderna COVID vaccine? No   Do you have transportation to get tested for COVID if it is indicated and ordered for you at an [facility] location? Yes   Provide any information you feel is important to  your history not asked above Had a procedure on pain pump with dye.  Have dye allergy and end up with sinus infection every time even though premedicated with benadryl and steroids.  Congestion is causing ear to ache.  Have sinus pressure in head.   Please attach any relevant images or files           Active Problem List with Overview Notes    Diagnosis Date Noted    Class 3 severe obesity in adult 06/23/2021    Liver fibrosis, F2, biopsy proven 6/2021 06/23/2021    HERNANDEZ (nonalcoholic steatohepatitis) 06/23/2021    Lumbar radiculopathy 05/25/2021    Dysphagia 10/21/2020    Lumbosacral radiculopathy 01/30/2019    Spondylosis of thoracic region without myelopathy or radiculopathy 01/07/2019    Type 2 diabetes mellitus without complication 02/15/2016    Chronic back pain 01/18/2016    B12 deficiency anemia 01/18/2016    Allergic rhinitis 08/25/2015    Anxiety and depression 08/25/2015    Asthma 08/25/2015    Gastroesophageal reflux disease 08/25/2015    Hyperlipidemia 08/25/2015    Fibromyalgia 05/28/2014    Irritable bowel syndrome (IBS) 10/31/2008    Hypertension 07/25/2007    Nonspecific colitis 12/31/2001      Recent Labs Obtained:  Lab Visit on 10/07/2022   Component Date Value Ref Range Status    PT 10/07/2022 12.7  11.8 - 14.7 sec Final    PT normal range is not established for pediatrics.    INR 10/07/2022 1.0   Final    WBC 10/07/2022 12.47  3.90 - 12.70 K/uL Final    RBC 10/07/2022 4.82  4.00 - 5.40 M/uL Final    Hemoglobin 10/07/2022 14.9  12.0 - 16.0 g/dL Final    Hematocrit 10/07/2022 44.6  37.0 - 48.5 % Final    MCV 10/07/2022 93  82 - 98 fL Final    MCH 10/07/2022 30.9  27.0 - 31.0 pg Final    MCHC 10/07/2022 33.4  32.0 - 36.0 g/dL Final    RDW 10/07/2022 12.6  11.5 - 14.5 % Final    Platelets 10/07/2022 319  150 - 450 K/uL Final    MPV 10/07/2022 9.8  9.2 - 12.9 fL Final    Immature Granulocytes 10/07/2022 0.6 (H)  0.0 - 0.5 % Final    Gran # (ANC) 10/07/2022 7.2  1.8 - 7.7 K/uL Final    Immature Grans  (Abs) 10/07/2022 0.07 (H)  0.00 - 0.04 K/uL Final    Comment: Mild elevation in immature granulocytes is non specific and   can be seen in a variety of conditions including stress response,   acute inflammation, trauma and pregnancy. Correlation with other   laboratory and clinical findings is essential.      Lymph # 10/07/2022 4.4  1.0 - 4.8 K/uL Final    Mono # 10/07/2022 0.7  0.3 - 1.0 K/uL Final    Eos # 10/07/2022 0.1  0.0 - 0.5 K/uL Final    Baso # 10/07/2022 0.03  0.00 - 0.20 K/uL Final    nRBC 10/07/2022 0  0 /100 WBC Final    Gran % 10/07/2022 57.8  38.0 - 73.0 % Final    Lymph % 10/07/2022 35.0  18.0 - 48.0 % Final    Mono % 10/07/2022 5.9  4.0 - 15.0 % Final    Eosinophil % 10/07/2022 0.5  0.0 - 8.0 % Final    Basophil % 10/07/2022 0.2  0.0 - 1.9 % Final    Differential Method 10/07/2022 Automated   Final    Sodium 10/07/2022 143  136 - 145 mmol/L Final    Potassium 10/07/2022 4.1  3.5 - 5.1 mmol/L Final    Chloride 10/07/2022 102  95 - 110 mmol/L Final    CO2 10/07/2022 30  22 - 31 mmol/L Final    Glucose 10/07/2022 89  70 - 110 mg/dL Final    Comment: The ADA recommends the following guidelines for fasting glucose:    Normal:       less than 100 mg/dL    Prediabetes:  100 mg/dL to 125 mg/dL    Diabetes:     126 mg/dL or higher      BUN 10/07/2022 8  7 - 18 mg/dL Final    Creatinine 10/07/2022 0.73  0.50 - 1.40 mg/dL Final    Calcium 10/07/2022 9.9  8.4 - 10.2 mg/dL Final    Total Protein 10/07/2022 6.8  6.0 - 8.4 g/dL Final    Albumin 10/07/2022 4.1  3.5 - 5.2 g/dL Final    Total Bilirubin 10/07/2022 0.4  0.2 - 1.3 mg/dL Final    Alkaline Phosphatase 10/07/2022 118  38 - 145 U/L Final    AST 10/07/2022 32  14 - 36 U/L Final    ALT 10/07/2022 61 (H)  0 - 35 U/L Final    Anion Gap 10/07/2022 11  8 - 16 mmol/L Final    eGFR 10/07/2022 >60  >60 mL/min/1.73 m^2 Final    CRP, High Sensitivity 10/07/2022 4.30 (H)  0.00 - 3.19 mg/L Final    Hemoglobin A1C 10/07/2022 6.3 (H)  0.0 - 5.6 % Final    Comment:  Reference Interval:  5.0 - 5.6 Normal   5.7 - 6.4 High Risk   > 6.5 Diabetic       Hgb A1c results are standardized based on the (NGSP) National   Glycohemoglobin Standardization Program.      Hemoglobin A1C levels are related to mean serum/plasma glucose   during the preceding 2-3 months.          Estimated Avg Glucose 10/07/2022 134 (H)  68 - 131 mg/dL Final    MRSA SCREEN BY PCR 10/07/2022 Negative  Negative Final       Encounter Diagnosis   Name Primary?    Acute rhinosinusitis Yes      Acute viral rhinosinusitis (AVRS) is expected to improve or resolve within 10 days. Patients with AVRS should be managed with supportive care.  The best recommendation at this time would be for you to use   Nasal Saline  Nasal Steroids (these take a little while to work, can be flonase, nasonex)  Decongestants (if you can tolerate)  If symptoms persist and/or worsen, we can consider antibiotics.           E-Visit Time Trackin mins

## 2022-10-13 ENCOUNTER — TELEPHONE (OUTPATIENT)
Dept: GASTROENTEROLOGY | Facility: CLINIC | Age: 42
End: 2022-10-13
Payer: COMMERCIAL

## 2022-10-13 DIAGNOSIS — R19.7 INTERMITTENT DIARRHEA: Primary | ICD-10-CM

## 2022-10-13 NOTE — TELEPHONE ENCOUNTER
"----- Message from [facility] sent at 10/11/2022  3:16 PM CDT -----  Regarding: Cancellation of Order # [id number]  Order number [id number] for the procedure STOOL   EXAM-OVA,CYSTS,PARASITES [[id number]] has been canceled by [facility] [[id number]]. This procedure was ordered by Susan Navarro ALL [[id number]] on Jun 21, 2022 for the patient Miri Earl [[id number]]. The reason for cancellation was "Other".    This was a future order.  "  Canceled Other [facility], [facility] 10/11/22 1516   Ova and Parasites Exam was cancelled on 10/11/2022 at 15:16 by [facility]; order error    "  "  Canceled Other [facility], [facility] 10/11/22 1513   WBC Stool was cancelled on 10/11/2022 at 15:12 by [facility]; order error    "  "  Canceled Other [facility], [facility] 10/11/22 1520   C. difficile by PCR was cancelled on 10/11/2022 at 15:20 by [facility]; Formed stool specimen received for testing. Testing is only performed on liquid/semi-formed specimens.    "  Ova parasites reordered since lab did not replace it. Lactoferrin ordered by lab. C. Diff order still in system  KTP  "

## 2022-10-17 ENCOUNTER — PATIENT MESSAGE (OUTPATIENT)
Dept: ADMINISTRATIVE | Facility: HOSPITAL | Age: 42
End: 2022-10-17
Payer: COMMERCIAL

## 2022-10-17 ENCOUNTER — PATIENT OUTREACH (OUTPATIENT)
Dept: ADMINISTRATIVE | Facility: HOSPITAL | Age: 42
End: 2022-10-17
Payer: COMMERCIAL

## 2022-10-17 NOTE — PROGRESS NOTES
10/17/2022 Care Everywhere updates requested and reviewed.  Immunizations reconciled. Media reports reviewed.  Duplicate HM overrides and  orders removed.  Overdue HM topic chart audit and/or requested.  Overdue lab testing linked to upcoming lab appointments if applies.    Health Maintenance Due   Topic Date Due    COVID-19 Vaccine (1) Never done    Pneumococcal Vaccines (Age 0-64) (1 - PCV) Never done    Eye Exam  2018    Foot Exam  2022    Influenza Vaccine (1) 2022

## 2022-10-25 ENCOUNTER — TELEPHONE (OUTPATIENT)
Dept: GASTROENTEROLOGY | Facility: CLINIC | Age: 42
End: 2022-10-25
Payer: COMMERCIAL

## 2022-10-25 NOTE — TELEPHONE ENCOUNTER
"Please call to inform & review the results with the patient- stool studies received back showed negative/unremarkable findings.  "  C Diff Toxin by PCR 10/11/22   Reason: Other   Comment: C. difficile by PCR was cancelled on 10/11/2022 at 15:20 by [facility]; Formed stool specimen received for testing. Testing is only performed on liquid/semi-formed specimens.   C Diff Toxin by PCR 10/11/22   Reason: Other   Comment: C. difficile by PCR was cancelled on 10/11/2022 at 15:20 by [facility]; Formed stool specimen received for testing. Testing is only performed on liquid/semi-formed specimens.   WBC, Stool 10/11/22   Reason: Other   Comment: WBC Stool was cancelled on 10/11/2022 at 15:12 by [facility]; order error   Stool Exam-Ova,Cysts,Parasites 10/11/22   Reason: Other   Comment: Ova and Parasites Exam was cancelled on 10/11/2022 at 15:16 by [facility]; order error   " These orders were reordered previously.    Continue with previous recommendations. If no improvement in symptoms or symptoms worsen, call/follow-up at clinic or go to ER.    Thanks,      "

## 2022-10-29 ENCOUNTER — PATIENT MESSAGE (OUTPATIENT)
Dept: GASTROENTEROLOGY | Facility: CLINIC | Age: 42
End: 2022-10-29
Payer: COMMERCIAL

## 2022-10-29 PROBLEM — J02.9 SORE THROAT: Status: ACTIVE | Noted: 2022-10-29

## 2022-10-31 ENCOUNTER — PATIENT MESSAGE (OUTPATIENT)
Dept: GASTROENTEROLOGY | Facility: CLINIC | Age: 42
End: 2022-10-31
Payer: COMMERCIAL

## 2022-10-31 ENCOUNTER — OFFICE VISIT (OUTPATIENT)
Dept: FAMILY MEDICINE | Facility: CLINIC | Age: 42
End: 2022-10-31
Payer: COMMERCIAL

## 2022-10-31 VITALS
DIASTOLIC BLOOD PRESSURE: 82 MMHG | TEMPERATURE: 98 F | HEART RATE: 103 BPM | BODY MASS INDEX: 37.61 KG/M2 | SYSTOLIC BLOOD PRESSURE: 116 MMHG | RESPIRATION RATE: 20 BRPM | OXYGEN SATURATION: 97 % | HEIGHT: 68 IN | WEIGHT: 248.13 LBS

## 2022-10-31 DIAGNOSIS — Z00.00 ROUTINE PHYSICAL EXAMINATION: Primary | ICD-10-CM

## 2022-10-31 DIAGNOSIS — M50.30 DDD (DEGENERATIVE DISC DISEASE), CERVICAL: ICD-10-CM

## 2022-10-31 PROCEDURE — 3074F PR MOST RECENT SYSTOLIC BLOOD PRESSURE < 130 MM HG: ICD-10-PCS | Mod: CPTII,S$GLB,, | Performed by: NURSE PRACTITIONER

## 2022-10-31 PROCEDURE — 4010F ACE/ARB THERAPY RXD/TAKEN: CPT | Mod: CPTII,S$GLB,, | Performed by: NURSE PRACTITIONER

## 2022-10-31 PROCEDURE — 99214 OFFICE O/P EST MOD 30 MIN: CPT | Mod: S$GLB,,, | Performed by: NURSE PRACTITIONER

## 2022-10-31 PROCEDURE — 3061F PR NEG MICROALBUMINURIA RESULT DOCUMENTED/REVIEW: ICD-10-PCS | Mod: CPTII,S$GLB,, | Performed by: NURSE PRACTITIONER

## 2022-10-31 PROCEDURE — 3044F PR MOST RECENT HEMOGLOBIN A1C LEVEL <7.0%: ICD-10-PCS | Mod: CPTII,S$GLB,, | Performed by: NURSE PRACTITIONER

## 2022-10-31 PROCEDURE — 3079F DIAST BP 80-89 MM HG: CPT | Mod: CPTII,S$GLB,, | Performed by: NURSE PRACTITIONER

## 2022-10-31 PROCEDURE — 99214 PR OFFICE/OUTPT VISIT, EST, LEVL IV, 30-39 MIN: ICD-10-PCS | Mod: S$GLB,,, | Performed by: NURSE PRACTITIONER

## 2022-10-31 PROCEDURE — 3061F NEG MICROALBUMINURIA REV: CPT | Mod: CPTII,S$GLB,, | Performed by: NURSE PRACTITIONER

## 2022-10-31 PROCEDURE — 3074F SYST BP LT 130 MM HG: CPT | Mod: CPTII,S$GLB,, | Performed by: NURSE PRACTITIONER

## 2022-10-31 PROCEDURE — 3008F BODY MASS INDEX DOCD: CPT | Mod: CPTII,S$GLB,, | Performed by: NURSE PRACTITIONER

## 2022-10-31 PROCEDURE — 3066F NEPHROPATHY DOC TX: CPT | Mod: CPTII,S$GLB,, | Performed by: NURSE PRACTITIONER

## 2022-10-31 PROCEDURE — 4010F PR ACE/ARB THEARPY RXD/TAKEN: ICD-10-PCS | Mod: CPTII,S$GLB,, | Performed by: NURSE PRACTITIONER

## 2022-10-31 PROCEDURE — 3044F HG A1C LEVEL LT 7.0%: CPT | Mod: CPTII,S$GLB,, | Performed by: NURSE PRACTITIONER

## 2022-10-31 PROCEDURE — 3079F PR MOST RECENT DIASTOLIC BLOOD PRESSURE 80-89 MM HG: ICD-10-PCS | Mod: CPTII,S$GLB,, | Performed by: NURSE PRACTITIONER

## 2022-10-31 PROCEDURE — 3008F PR BODY MASS INDEX (BMI) DOCUMENTED: ICD-10-PCS | Mod: CPTII,S$GLB,, | Performed by: NURSE PRACTITIONER

## 2022-10-31 PROCEDURE — 3066F PR DOCUMENTATION OF TREATMENT FOR NEPHROPATHY: ICD-10-PCS | Mod: CPTII,S$GLB,, | Performed by: NURSE PRACTITIONER

## 2022-10-31 NOTE — PROGRESS NOTES
Patient ID: Miri Earl is a 42 y.o. female.    Chief Complaint: Pre-op Exam      Miri Earl is in the office for a preop clearance. She is having ACDF with plate C5-C6 on 11/17/22 with Dr Herrera and pain pump replacement on 11/23/22 with Dr Edouard.   Pt to have clearance from ID as well. Hx of surgical infections.   Pt has lost about 60 lbs in past year.  Pt cleared by Dr Forte, stress test 10/28/22.  stress test was mildly abnormal, ok for surgery per Dr Forte.    DM  Lab Results   Component Value Date    HGBA1C 6.3 (H) 10/07/2022         HPI    Past Medical History:   Diagnosis Date    Anxiety     Arthritis     Asthma     Breast cyst     Chronic back pain     Colon polyp     Depression     Diabetes mellitus     Elevated liver enzymes     Fibrocystic breast     Fibromyalgia     GERD (gastroesophageal reflux disease)     History of Chiari malformation     History of colitis     History of gastritis     Hypertension     Hypokalemia     IBS (irritable bowel syndrome)     Insomnia     MVP (mitral valve prolapse)     HERNANDEZ (nonalcoholic steatohepatitis) 6/23/2021    Neuropathy     Postmenopausal HRT (hormone replacement therapy)     Seasonal allergies     Sleep apnea with use of continuous positive airway pressure (CPAP)     Spondylitis     Thyroid nodule              Current Outpatient Medications:     albuterol (PROVENTIL/VENTOLIN HFA) 90 mcg/actuation inhaler, USE 2 PUFFS EVERY 6 HOURS AS NEEDED FOR WHEEZING, Disp: 18 g, Rfl: 1    atorvastatin (LIPITOR) 20 MG tablet, Take 1 tablet (20 mg total) by mouth every evening., Disp: 90 tablet, Rfl: 1    azithromycin (Z-ELI) 250 MG tablet, Take 2 tablets by mouth on day 1; Take 1 tablet by mouth on days 2-5, Disp: 6 tablet, Rfl: 0    budesonide (PULMICORT) 0.5 mg/2 mL nebulizer solution, USE 2 ML(0.5 MG) VIA NEBULIZER TWICE DAILY AS NEEDED, Disp: 60 mL, Rfl: 1    cetirizine (ZYRTEC) 10 MG tablet, Take 1 tablet (10 mg total) by mouth once daily., Disp: 90 tablet, Rfl: 3     cyanocobalamin 1,000 mcg/mL injection, INJECT 1 ML IN THE MUSCLE EVERY 30 DAYS, Disp: 10 mL, Rfl: 1    dexlansoprazole (DEXILANT) 60 mg capsule, Take 1 capsule (60 mg total) by mouth before breakfast., Disp: 30 capsule, Rfl: 11    EPINEPHrine (EPIPEN) 0.3 mg/0.3 mL AtIn, as needed., Disp: , Rfl:     famotidine (PEPCID) 40 MG tablet, Take 1 tablet (40 mg total) by mouth every evening., Disp: 60 tablet, Rfl: 5    gabapentin (NEURONTIN) 600 MG tablet, TAKE 2 TABLETS(1200 MG) BY MOUTH THREE TIMES DAILY, Disp: 540 tablet, Rfl: 1    ketorolac (TORADOL) 10 mg tablet, Take by mouth daily as needed. TAKE 1 TABLET BY MOUTH EVERY 6 HOURS FOR 5 DAYS, Disp: , Rfl:     levalbuterol (XOPENEX) 1.25 mg/3 mL nebulizer solution, Take 3 mLs (1.25 mg total) by nebulization every 4 (four) hours as needed for Wheezing. Rescue, Disp: 30 mL, Rfl: 1    linaGLIPtin (TRADJENTA) 5 mg Tab tablet, Take 1 tablet (5 mg total) by mouth every evening., Disp: 30 tablet, Rfl: 6    lisinopriL (PRINIVIL,ZESTRIL) 2.5 MG tablet, Take 1 tablet (2.5 mg total) by mouth once daily., Disp: 90 tablet, Rfl: 3    metoprolol succinate (TOPROL-XL) 50 MG 24 hr tablet, Take 1 tablet (50 mg total) by mouth every evening., Disp: 90 tablet, Rfl: 3    montelukast (SINGULAIR) 10 mg tablet, Take 1 tablet (10 mg total) by mouth every evening., Disp: 90 tablet, Rfl: 3    morphine 100 mg/100 mL (1 mg/mL) infusion, 3.6 mcg/kg/hr continuous. Intrathecal administration per pain pump, Disp: , Rfl:     ondansetron (ZOFRAN) 8 MG tablet, Take 1 tablet (8 mg total) by mouth every 8 (eight) hours., Disp: 6 tablet, Rfl: 1    oxyCODONE (ROXICODONE) 10 mg Tab immediate release tablet, TAKE 1 TABLET BY MOUTH EVERY 6 HOURS FOR 3 DAYS, Disp: , Rfl:     potassium chloride SA (K-DUR,KLOR-CON) 20 MEQ tablet, TAKE 1 TABLET(20 MEQ) BY MOUTH EVERY EVENING, Disp: 90 tablet, Rfl: 3    promethazine (PHENERGAN) 25 MG tablet, Take 25 mg by mouth every 8 (eight) hours as needed., Disp: , Rfl:     sodium  chloride 0.9% 0.9 % SolP with bupivacaine 0.5% (5 mg/ml) 0.5 % (5 mg/mL) Soln 0.1 %, by Epidural route continuous., Disp: , Rfl:     The 10-year ASCVD risk score (Luli WHITLOCK, et al., 2019) is: 6%    Values used to calculate the score:      Age: 42 years      Sex: Female      Is Non- : No      Diabetic: Yes      Tobacco smoker: Yes      Systolic Blood Pressure: 116 mmHg      Is BP treated: Yes      HDL Cholesterol: 34 mg/dL      Total Cholesterol: 135 mg/dL     Wt Readings from Last 3 Encounters:   10/31/22 112.5 kg (248 lb 2 oz)   10/29/22 112.5 kg (248 lb 0.3 oz)   10/11/22 112.5 kg (248 lb)     Temp Readings from Last 3 Encounters:   10/31/22 97.6 °F (36.4 °C)   10/29/22 98.4 °F (36.9 °C) (Oral)   09/25/22 97.5 °F (36.4 °C)     BP Readings from Last 3 Encounters:   10/31/22 116/82   10/29/22 118/82   10/11/22 108/78     Pulse Readings from Last 3 Encounters:   10/31/22 103   10/29/22 88   10/11/22 98     Resp Readings from Last 3 Encounters:   10/31/22 20   10/11/22 12   09/25/22 16     PF Readings from Last 3 Encounters:   No data found for PF     SpO2 Readings from Last 3 Encounters:   10/31/22 97%   10/29/22 97%   09/25/22 96%        Lab Results   Component Value Date    HGBA1C 6.3 (H) 10/07/2022    HGBA1C 6.0 (H) 06/01/2022    HGBA1C 6.4 (H) 11/13/2021     Lab Results   Component Value Date    LDLCALC 75.8 06/01/2022    CREATININE 0.73 10/07/2022       Review of Systems   Constitutional:  Negative for activity change and unexpected weight change.   HENT:  Negative for hearing loss, rhinorrhea and trouble swallowing.    Eyes:  Negative for discharge and visual disturbance.   Respiratory:  Negative for chest tightness and wheezing.    Cardiovascular:  Negative for chest pain and palpitations.   Gastrointestinal:  Positive for constipation. Negative for blood in stool, diarrhea and vomiting.   Endocrine: Negative for polydipsia and polyuria.   Genitourinary:  Negative for difficulty  urinating, dysuria, hematuria and menstrual problem.   Musculoskeletal:  Positive for arthralgias and neck pain. Negative for joint swelling.   Neurological:  Negative for weakness and headaches.   Psychiatric/Behavioral:  Negative for confusion and dysphoric mood.        Constitutional: Negative for activity change and appetite change.   HENT: Negative for congestion, postnasal drip, rhinorrhea and sinus pressure.    Eyes: Negative for pain and redness.   Respiratory: Negative for choking and chest tightness.    Gastrointestinal: Negative for abdominal distention, abdominal pain, blood in stool, constipation, diarrhea, nausea and vomiting.   Endocrine: Negative for polydipsia and polyphagia.   Genitourinary: Negative for dysuria and hematuria.   Musculoskeletal: Negative for arthralgias and myalgias.   Skin: Negative for color change and rash.   Neurological: Negative for dizziness and headaches.   Psychiatric/Behavioral: Negative for agitation and behavioral problems.   No prior adverse reactions to anesthesia.    Objective:      Physical Exam  Constitutional:       Appearance: She is obese.         Constitutional:       General: She is not in acute distress.     Appearance: She is well-developed. She is not diaphoretic.   HENT:      Head: Normocephalic and atraumatic.      Right Ear: Hearing, tympanic membrane, ear canal and external ear normal.      Left Ear: Hearing, tympanic membrane, ear canal and external ear normal.      Nose: Nose normal.      Mouth/Throat:      Pharynx: Uvula midline.   Eyes:      General:         Right eye: No discharge.         Left eye: No discharge.      Conjunctiva/sclera: Conjunctivae normal.      Pupils: Pupils are equal, round, and reactive to light.   Neck:      Musculoskeletal: Normal range of motion and neck supple.      Thyroid: No thyromegaly.      Vascular: No carotid bruit or JVD.      Trachea: Trachea normal.   Cardiovascular:      Rate and Rhythm: Normal rate and regular  rhythm. No carotid bruits.     Heart sounds: No murmur. No friction rub. No gallop.    Pulmonary:      Effort: Pulmonary effort is normal. No respiratory distress.      Breath sounds: Normal breath sounds. No wheezing or rales.   Chest:      Chest wall: No tenderness.   Abdominal:      General: Bowel sounds are normal. There is no distension.      Palpations: Abdomen is soft. There is no mass.      Tenderness: There is no abdominal tenderness. There is no guarding or rebound.   Musculoskeletal: Normal range of motion.   Skin:     General: Skin is warm and dry.   Neurological:      Mental Status: She is alert and oriented to person, place, and time.      Coordination: Coordination normal.   Psychiatric:         Behavior: Behavior normal.         Thought Content: Thought content normal.         Judgment: Judgment normal.       Screening recommendations appropriate to age and health status were reviewed.    Routine physical examination    DDD (degenerative disc disease), cervical      RCRI risk factors include: no known RCRI risk factors. As such, per RCRI the risk of cardiac death, nonfatal myocardial infarction, or nonfatal cardiac arrest is 0.4% and the risk of myocardial infarction, pulmonary edema, ventricular fibrillation, primary cardiac arrest, or complete heart block is 0.5%.  Overall this patient can be considered low risk for this low risk procedure. No further cardiac testing is recommended at this time.     Patient denies any symptoms (as per HPI) concerning for undiagnosed lung disease. Would not recommend obtaining chest X-ray, or PFTs at this time. Patient was counseled on the importance of quitting smoking ideally 8 weeks prior to surgery. We discussed the benefits of early mobilization and deep breathing after surgery. Pt on CPAP for REUBEN..     Screened patient for alcohol misuse, use of illicit drugs, and personal or family history of anesthetic complications or bleeding diathesis and no substantial  concerns were identified.     All current medications were reviewed and at this time no changes to medications are recommended prior to surgery.     I recommend use of standard pre-op and post-op precautions for this patient. In my opinion, she is medically optimized for this procedure, and can proceed without further evaluation.     I spent 30 minutes on this encounter, time includes face-to-face, chart review, documentation, test review and orders.

## 2022-10-31 NOTE — TELEPHONE ENCOUNTER
OK to refill the Symproic.  Constipation due to opioid therapy  -  START   naldemedine (SYMPROIC) 0.2 mg Tab; Take 0.2 mg by mouth once daily.  Dispense: 30 tablet; Refill: 3    Sent to her [facility]..

## 2022-10-31 NOTE — Clinical Note
Please request last eye exam [facility] [place] (or [place] she cannot remember), pt is going soon

## 2022-10-31 NOTE — TELEPHONE ENCOUNTER
My chart message sent to patient, see below    Per Dr. Weber to refill the Symproic.  Constipation due to opioid therapy  -  START   naldemedine (SYMPROIC) 0.2 mg Tab; Take 0.2 mg by mouth once daily.  Dispense: 30 tablet; Refill: 3     Sent to her [facility]..

## 2022-11-02 ENCOUNTER — OFFICE VISIT (OUTPATIENT)
Dept: INFECTIOUS DISEASES | Facility: CLINIC | Age: 42
End: 2022-11-02
Payer: COMMERCIAL

## 2022-11-02 VITALS
OXYGEN SATURATION: 98 % | HEIGHT: 68 IN | WEIGHT: 248.63 LBS | HEART RATE: 112 BPM | BODY MASS INDEX: 37.68 KG/M2 | SYSTOLIC BLOOD PRESSURE: 118 MMHG | DIASTOLIC BLOOD PRESSURE: 72 MMHG | TEMPERATURE: 99 F

## 2022-11-02 DIAGNOSIS — E11.9 TYPE 2 DIABETES MELLITUS WITHOUT COMPLICATION, WITHOUT LONG-TERM CURRENT USE OF INSULIN: ICD-10-CM

## 2022-11-02 DIAGNOSIS — Z01.818 PRE-OPERATIVE CLEARANCE: Primary | ICD-10-CM

## 2022-11-02 DIAGNOSIS — Z86.19 HISTORY OF WOUND INFECTION: ICD-10-CM

## 2022-11-02 PROCEDURE — 3008F PR BODY MASS INDEX (BMI) DOCUMENTED: ICD-10-PCS | Mod: CPTII,S$GLB,, | Performed by: INTERNAL MEDICINE

## 2022-11-02 PROCEDURE — 3061F NEG MICROALBUMINURIA REV: CPT | Mod: CPTII,S$GLB,, | Performed by: INTERNAL MEDICINE

## 2022-11-02 PROCEDURE — 99214 PR OFFICE/OUTPT VISIT, EST, LEVL IV, 30-39 MIN: ICD-10-PCS | Mod: 25,S$GLB,, | Performed by: INTERNAL MEDICINE

## 2022-11-02 PROCEDURE — 3078F PR MOST RECENT DIASTOLIC BLOOD PRESSURE < 80 MM HG: ICD-10-PCS | Mod: CPTII,S$GLB,, | Performed by: INTERNAL MEDICINE

## 2022-11-02 PROCEDURE — 3074F PR MOST RECENT SYSTOLIC BLOOD PRESSURE < 130 MM HG: ICD-10-PCS | Mod: CPTII,S$GLB,, | Performed by: INTERNAL MEDICINE

## 2022-11-02 PROCEDURE — 3066F NEPHROPATHY DOC TX: CPT | Mod: CPTII,S$GLB,, | Performed by: INTERNAL MEDICINE

## 2022-11-02 PROCEDURE — 1159F MED LIST DOCD IN RCRD: CPT | Mod: CPTII,S$GLB,, | Performed by: INTERNAL MEDICINE

## 2022-11-02 PROCEDURE — 90471 FLU VACCINE (QUAD) GREATER THAN OR EQUAL TO 3YO PRESERVATIVE FREE IM: ICD-10-PCS | Mod: S$GLB,,, | Performed by: INTERNAL MEDICINE

## 2022-11-02 PROCEDURE — 3074F SYST BP LT 130 MM HG: CPT | Mod: CPTII,S$GLB,, | Performed by: INTERNAL MEDICINE

## 2022-11-02 PROCEDURE — 4010F ACE/ARB THERAPY RXD/TAKEN: CPT | Mod: CPTII,S$GLB,, | Performed by: INTERNAL MEDICINE

## 2022-11-02 PROCEDURE — 4010F PR ACE/ARB THEARPY RXD/TAKEN: ICD-10-PCS | Mod: CPTII,S$GLB,, | Performed by: INTERNAL MEDICINE

## 2022-11-02 PROCEDURE — 3044F PR MOST RECENT HEMOGLOBIN A1C LEVEL <7.0%: ICD-10-PCS | Mod: CPTII,S$GLB,, | Performed by: INTERNAL MEDICINE

## 2022-11-02 PROCEDURE — 99214 OFFICE O/P EST MOD 30 MIN: CPT | Mod: 25,S$GLB,, | Performed by: INTERNAL MEDICINE

## 2022-11-02 PROCEDURE — 90471 IMMUNIZATION ADMIN: CPT | Mod: S$GLB,,, | Performed by: INTERNAL MEDICINE

## 2022-11-02 PROCEDURE — 3008F BODY MASS INDEX DOCD: CPT | Mod: CPTII,S$GLB,, | Performed by: INTERNAL MEDICINE

## 2022-11-02 PROCEDURE — 1159F PR MEDICATION LIST DOCUMENTED IN MEDICAL RECORD: ICD-10-PCS | Mod: CPTII,S$GLB,, | Performed by: INTERNAL MEDICINE

## 2022-11-02 PROCEDURE — 3066F PR DOCUMENTATION OF TREATMENT FOR NEPHROPATHY: ICD-10-PCS | Mod: CPTII,S$GLB,, | Performed by: INTERNAL MEDICINE

## 2022-11-02 PROCEDURE — 3044F HG A1C LEVEL LT 7.0%: CPT | Mod: CPTII,S$GLB,, | Performed by: INTERNAL MEDICINE

## 2022-11-02 PROCEDURE — 3078F DIAST BP <80 MM HG: CPT | Mod: CPTII,S$GLB,, | Performed by: INTERNAL MEDICINE

## 2022-11-02 PROCEDURE — 3061F PR NEG MICROALBUMINURIA RESULT DOCUMENTED/REVIEW: ICD-10-PCS | Mod: CPTII,S$GLB,, | Performed by: INTERNAL MEDICINE

## 2022-11-02 PROCEDURE — 90686 IIV4 VACC NO PRSV 0.5 ML IM: CPT | Mod: S$GLB,,, | Performed by: INTERNAL MEDICINE

## 2022-11-02 PROCEDURE — 90686 FLU VACCINE (QUAD) GREATER THAN OR EQUAL TO 3YO PRESERVATIVE FREE IM: ICD-10-PCS | Mod: S$GLB,,, | Performed by: INTERNAL MEDICINE

## 2022-11-02 RX ORDER — MUPIROCIN 20 MG/G
OINTMENT TOPICAL
Qty: 22 G | Refills: 0 | Status: SHIPPED | OUTPATIENT
Start: 2022-11-02 | End: 2023-03-23

## 2022-11-02 NOTE — PATIENT INSTRUCTIONS
No infectious diseases contraindication for proposed procedure.    Please follow the disinfection instructions  I would recommend vancomycin 15 mg/kg and cefazolin 2g as pre-procedure antibiotics.  Take special care to protect your incision from bacteria found in the restroom  Showers, no baths    Try to reduce cigarettes

## 2022-11-02 NOTE — PROGRESS NOTES
Subjective:      Reason for consult: ?infection    HPI: Miri Earl is a 42 y.o. female     2011: fall and back injury. Has had a total of 8 back surgeries.   5 years ago, she had a local infection over the spinal cord stimulator generator. The generator and leads were removed at  [facility] (Dr. Edouard). She had IV antibiotics through a picc line, managed by Dr. Edouard, vancomycin and a second one she cannot recall and took this for 6 weeks. Denies involvement of spine. Ultimately the wound healed. Subsequent to this, she had an intra-thecal pain pump put in by a different physician in [place]. She sees Dr. Edouard regularly, every 6 weeks, to refill the pump.    The pump was last refilled on 4/26(the morphine was increased and bupivacaine decreased because her right leg was numb,and then she began to have pain in the lower back on 4/28 with constitutional symptoms.  She has had no fever(she reports she never registers fever), but did have chills(chilly, not rigors), and sweats. Valium was added on 4/30 for pain, and she also took hydrocodone alternating with valium  and medical marijuana. Chilly feelings and sweats were the same through the following 10 days and worse at night. Lab done on 5/8 included 48 ESR and CRP 3 (0-0.9) with normal CBC and elevated LFTs. abd US showed fatty liver. On 5/10 the dose of meds within the pump were changed and he increased the morphine again, no change in symptoms. Yesterday her BP was higher. WEBB due to asthma was stable until yesterday when she felt more SOB.  She no longer uses a maintenance asthma med and has not refilled rescue inhaler or neb solution or seen allergist in some time. Quit smoking a couple of months ago.   BS around 130 and A1c 6.6%  Has chronic urinary retention, does not get frequent UTIs, but did have nighttime incontinence twice in the last week. (likely overflow incontinence). Has seen Dr. Fitch in the past.     11/2/22: she needs to have a replacement  of intrathecal pain pump. She has not had any skin infections since last visit. A1c 6.3%. unfortunately smoking again 1/2-1 ppd, since death of her 22 year old daughter from COVID last year..   Treated for strep throat in July, and currently on antibiotic (zpack) for URI symptoms and sore throat, strep and flu were negative. (Urgent care).   Fatty liver with fibrosis, followed by hepatology, lost 40# in the last year  3 weeks ago nausea and abd pain, and mild diarrhea, and infectious diarrhea work up negative. No diarrhea any longer. She does have IBS    Review of patient's allergies indicates:   Allergen Reactions    Doxycycline Anaphylaxis    Iodinated contrast media Anaphylaxis    Latex, natural rubber Anaphylaxis    Shellfish containing products Anaphylaxis    Morphine Other (See Comments)     Hallucinations only with oral use     Past Medical History:   Diagnosis Date    Anxiety     Arthritis     Asthma     Breast cyst     Chronic back pain     Colon polyp     Depression     Diabetes mellitus     Elevated liver enzymes     Fibrocystic breast     Fibromyalgia     GERD (gastroesophageal reflux disease)     History of Chiari malformation     History of colitis     History of gastritis     Hypertension     Hypokalemia     IBS (irritable bowel syndrome)     Insomnia     MVP (mitral valve prolapse)     HERNANDEZ (nonalcoholic steatohepatitis) 2021    Neuropathy     Postmenopausal HRT (hormone replacement therapy)     Seasonal allergies     Sleep apnea with use of continuous positive airway pressure (CPAP)     Spondylitis     Thyroid nodule      Past Surgical History:   Procedure Laterality Date    APPENDECTOMY  10/15/2009  [facility]    BACK SURGERY      BACK SURGERY      xs 4    BACK SURGERY       SECTION      x 3    CHOLECYSTECTOMY      COLONOSCOPY    [facility]    COLONOSCOPY  08/15/2014    Dr. Colunga: 1 colon polyp removed, hemorrhoids, Mild colonic spasm consistent with irritable bowel syndrome; repeat in  5 years for surveillance; biopsy: TUBULAR ADENOMA.    COLONOSCOPY N/A 10/21/2020    Procedure: COLONOSCOPY;  Surgeon: Earl Colunga Jr., MD; 2 colon polyps removed, hemorrhoids, Mild colonic spasm consistent with irritable bowel syndrome. repeat in 5 years for surveillance; biopsy: polyps- TUBULAR ADENOMA & hyperplastic polyp; random TI & colon biopsies WNL    COLONOSCOPY W/ POLYPECTOMY  10/31/2008  [facility]    2 mm polyp in the sigmoid colon, TUBULAR ADENOMATOUS POLYP.  Erythematous mucosa rectum, proctitis (prep  proctitis?). Irritable bowel syndrome.     ESOPHAGOGASTRODUODENOSCOPY  08/15/2014    Dr. Colunga: Reflux esophagitis, No endoscopic esophageal abnormality to explain patient's dysphagia. Esophagus dilated, 54FR, history/examination was suspicious for an esophageal spasm; gastritis; biopsy: stomach- unremarkable, bishop test negative    ESOPHAGOGASTRODUODENOSCOPY N/A 10/21/2020    Surgeon: Earl Colunga Jr., MD;  Location: [facility]; Reflux esophagitis; Gastritis. Enlarged gastric folds; No endoscopic esophageal abnormality to explain patient's dysphagia. Esophagus dilated, 51FR; biopsy: duodenum WNL; stomach- REACTIVE GASTRITIS, negative H pylori    ESOPHAGOGASTRODUODENOSCOPY N/A 05/04/2022    Procedure: EGD (ESOPHAGOGASTRODUODENOSCOPY);  Surgeon: Earl Colunga Jr., MD;  Location: [facility];  Service: Endoscopy;  Laterality: N/A; Slight antritis, esophageal dilation performed; biopsy: stomach- mild chronic inactive gastritis with focal intestinal metaplasia (complete type), negative for dysplasia; negative for h pylori    GANGLION CYST EXCISION Right     HYSTERECTOMY  10/2007  [facility]    LIVER BIOPSY  10/20/2008    Mild portal mixed leukocytic infiltrate,neutrophils and lymphocytes.  Bile ducts seen, negative for leukocytic infiltration.  Negative for steatohepatitis. Mild sinusoidal dilatation and congestion. Negative for granulomata nor malignancy.  Reticulum stain negative for hepaticfibrosis.  Negative  iron stain.  PAS stain, negative for PAS-positive inclusions.    LUMBAR DISCECTOMY      LUMBAR FUSION      MYELOGRAPHY N/A 01/07/2019    Procedure: Myelogram lumbar and thoracic;  Surgeon: Hari Rich MD;  Location: [facility];  Service: Radiology;  Laterality: N/A;    neurostimulator removal       OOPHORECTOMY      pain pump      PELVIC LAPAROSCOPY      SELECTIVE INJECTION OF ANESTHETIC AGENT AROUND LUMBAR SPINAL NERVE ROOT BY TRANSFORAMINAL APPROACH Bilateral 01/30/2019    Procedure: BLOCK, SPINAL NERVE ROOT, LUMBAR, SELECTIVE, TRANSFORAMINAL APPROACH; L3;  Surgeon: Tez Edouard MD;  Location: [facility];  Service: Pain Management;  Laterality: Bilateral;    SPINAL CORD STIMULATOR IMPLANT      UPPER GASTROINTESTINAL ENDOSCOPY  10/31/2008  [facility]    Normal esophagus. Lax LES, non-erosive esophageal reflux (NERD?) Erythematous gastropathy on greater curve.  PRABHU Test performed on 10/31/08 was Negative.       Social History     Tobacco Use    Smoking status: Every Day     Packs/day: 1.00     Years: 17.00     Pack years: 17.00     Types: Cigarettes    Smokeless tobacco: Never   Substance Use Topics    Alcohol use: Not Currently     Family History   Problem Relation Age of Onset    Diabetes Mother     Hypertension Mother     Diabetes Father     Hypertension Father     Colon cancer Neg Hx     Crohn's disease Neg Hx     Ulcerative colitis Neg Hx     Stomach cancer Neg Hx     Esophageal cancer Neg Hx     Cirrhosis Neg Hx        Pertinent medications noted:     Review of Systems    Ability to give history is: excellent  No rigors, fever, unintentional  weight loss  No change in vision, loss of vision or diplopia  No sinus congestion, purulent nasal discharge,   No pain in mouth or throat.    No chest pain, , syncope. Does get palpitations due to MVP.  No cough, sputum production,    No nausea, vomiting, diarrhea, or focal abd pain.    No dysuria, hematuria,   And no gyn issues currently.much fewer UTIs since last  "visit. Was seen by urology  No swelling of joints, redness of joints, injuries,      No unusual headaches, dizziness,  And has chronic headaches from Chiari malformation  No anxiety, depression, substance abuse, sleep disturbance  DM with good Aic   No bleeding, lymphadenopathy,   malignancy, unusual bruising   She has had Staph infections in the past  No TB exposure or positive tb test  Outdoor activities: stays inside  Travel: none in the last year  Implants: intrathecal pump, spine hardware  Antibiotic History:   Had COVID last august (daughter with Down's  last year from COVID) and no COVID vaccine. Children are home schooled: ages 13,14,17,22    Objective:      Blood pressure 118/72, pulse (!) 112, temperature 98.5 °F (36.9 °C), height 5' 8" (1.727 m), weight 112.8 kg (248 lb 9.6 oz), SpO2 98 %. Body mass index is 37.8 kg/m².  Physical Exam      General: Alert and attentive, cooperative and in no distress, non toxic    Eyes: Pupils equal, round, reactive to light, anicteric, EOMI    Neck: Supple, non-tender, no thyromegaly or masses    ENT: EAC patent, TM normal, nares patent, no oral lesions, teeth in good condition, no thrush    Cardiovascular: Regular rate and rhythm, no murmurs, rubs, or gallop    Respiratory: Lungs clear without wheezes, rales, rubs or rhonci,      Gastrointestinal:  Soft, bowel sounds normal,  no mass or organomegaly, no tenderness or distention    Genitourinary:   no flank tenderness    Integumentary: Skin without rashes,   or wounds.        Vascular: No palpable peripheral edema or phlebitis, warm and well perfused    Musculoskeletal: Ambulates with difficulty, no acute arthritis, synovitis or myositis. Normal muscle bulk and strength    Lymphatic: No cervical, axillary, or inguinal LAD    Neurological: Normal LOC, cranial nerves, speech,  normal, gait    Psychiatric: Normal mood, speech,  demeanor     Wound: right lower back intrathecal pump has no redness, swelling, palpable " fluid, sinus tracts or drainage. It is not tender to palpation.    VAD:       General Labs reviewed:  Lab Results   Component Value Date    WBC 12.47 10/07/2022    RBC 4.82 10/07/2022    HGB 14.9 10/07/2022    HCT 44.6 10/07/2022    MCV 93 10/07/2022    MCH 30.9 10/07/2022    MCHC 33.4 10/07/2022    RDW 12.6 10/07/2022     10/07/2022    MPV 9.8 10/07/2022    GRAN 7.2 10/07/2022    GRAN 57.8 10/07/2022    LYMPH 4.4 10/07/2022    LYMPH 35.0 10/07/2022    MONO 0.7 10/07/2022    MONO 5.9 10/07/2022    EOS 0.1 10/07/2022    BASO 0.03 10/07/2022    EOSINOPHIL 0.5 10/07/2022    BASOPHIL 0.2 10/07/2022     CMP  Sodium   Date Value Ref Range Status   10/07/2022 143 136 - 145 mmol/L Final   10/15/2015 143 137 - 145 MMOL/L Final     Potassium   Date Value Ref Range Status   10/07/2022 4.1 3.5 - 5.1 mmol/L Final   10/15/2015 4.6 3.5 - 5.1 MMOL/L Final     Chloride   Date Value Ref Range Status   10/07/2022 102 95 - 110 mmol/L Final   10/15/2015 109 (H) 98 - 107 MMOL/L Final     CO2   Date Value Ref Range Status   10/07/2022 30 22 - 31 mmol/L Final     Glucose   Date Value Ref Range Status   10/07/2022 89 70 - 110 mg/dL Final     Comment:     The ADA recommends the following guidelines for fasting glucose:    Normal:       less than 100 mg/dL    Prediabetes:  100 mg/dL to 125 mg/dL    Diabetes:     126 mg/dL or higher       BUN   Date Value Ref Range Status   10/07/2022 8 7 - 18 mg/dL Final     Creatinine   Date Value Ref Range Status   10/07/2022 0.73 0.50 - 1.40 mg/dL Final   10/15/2015 0.84 0.52 - 1.04 MG/DL Final     Calcium   Date Value Ref Range Status   10/07/2022 9.9 8.4 - 10.2 mg/dL Final     Total Protein   Date Value Ref Range Status   10/07/2022 6.8 6.0 - 8.4 g/dL Final     Albumin   Date Value Ref Range Status   10/07/2022 4.1 3.5 - 5.2 g/dL Final   10/15/2015 4.0 3.5 - 5.0 G/DL Final     Total Bilirubin   Date Value Ref Range Status   10/07/2022 0.4 0.2 - 1.3 mg/dL Final     Alkaline Phosphatase   Date Value  Ref Range Status   10/07/2022 118 38 - 145 U/L Final     AST ([place])   Date Value Ref Range Status   01/19/2016 26 14 - 36 U/L Final     AST   Date Value Ref Range Status   10/07/2022 32 14 - 36 U/L Final     ALT   Date Value Ref Range Status   10/07/2022 61 (H) 0 - 35 U/L Final     Anion Gap   Date Value Ref Range Status   10/07/2022 11 8 - 16 mmol/L Final     eGFR if    Date Value Ref Range Status   06/01/2022 >60.0 >60 mL/min/1.73 m^2 Final     eGFR if non    Date Value Ref Range Status   06/01/2022 >60.0 >60 mL/min/1.73 m^2 Final     Comment:     Calculation used to obtain the estimated glomerular filtration  rate (eGFR) is the CKD-EPI equation.          Results for DAVID APRIL SONIA (MRN [id number]) as of 5/13/2021 16:59   Ref. Range 5/13/2021 11:18 5/13/2021 11:36   Sed Rate Latest Ref Range: 0 - 20 mm/Hr  42 (H)   CRP Latest Ref Range: <0.76 mg/dL  1.72 (H)   Procalcitonin Latest Ref Range: 0.00 - 0.50 ng/mL  <0.05   Specimen UA Unknown Urine, Clean Catch    Color, UA Latest Ref Range: Yellow, Straw, Missy  Yellow    Appearance, UA Latest Ref Range: Clear  Clear    Specific [place], UA Latest Ref Range: 1.005 - 1.030  1.015    pH, UA Latest Ref Range: 5.0 - 8.0  6.0    Protein, UA Latest Ref Range: Negative  1+ (A)    Glucose, UA Latest Ref Range: Negative  Negative    Ketones, UA Latest Ref Range: Negative  Negative    Occult Blood UA Latest Ref Range: Negative  Negative    NITRITE UA Latest Ref Range: Negative  Negative    UROBILINOGEN UA Latest Ref Range: Negative EU/dL Negative    Bilirubin (UA) Latest Ref Range: Negative  Negative    Leukocytes, UA Latest Ref Range: Negative  1+ (A)    RBC, UA Latest Ref Range: 0 - 4 /hpf 1    WBC, UA Latest Ref Range: 0 - 5 /hpf 29 (H)    Bacteria, UA Latest Ref Range: None-Occ /hpf Negative    Squam Epithel, UA Latest Units: /hpf 0    Hyaline Casts, UA Latest Ref Range: 0-1/lpf /lpf 5 (A)    Microscopic Comment Unknown SEE COMMENT         Micro:  MRSA nasal screen 10/7 was negative  Microbiology Results (last 7 days)       ** No results found for the last 168 hours. **          Imaging Reviewed:        Assessment:       Problem List Items Addressed This Visit       Diabetes mellitus     Other Visit Diagnoses       Pre-operative clearance    -  Primary    History of wound infection                 Plan:           Pre-operative clearance    History of wound infection    Type 2 diabetes mellitus without complication, without long-term current use of insulin    Other orders  -     mupirocin (BACTROBAN) 2 % ointment; Apply intra-nasally twice a day  Dispense: 22 g; Refill: 0  -     Influenza - Quadrivalent (PF)     No infectious diseases contraindication for proposed procedure.    Please follow the disinfection instructions  I would recommend vancomycin 15 mg/kg and cefazolin 2g as pre-procedure antibiotics.  Take special care to protect your incision from bacteria found in the restroom ( discussed at length that lower back incisions are at increased risk for gram negative infections due to proximity to anus)  Showers, no baths    Try to reduce cigarettes  This note was created using Dragon voice recognition software that occasionally misinterpreted phrases or words.

## 2022-11-03 ENCOUNTER — PATIENT OUTREACH (OUTPATIENT)
Dept: ADMINISTRATIVE | Facility: HOSPITAL | Age: 42
End: 2022-11-03
Payer: COMMERCIAL

## 2022-11-03 NOTE — LETTER
AUTHORIZATION FOR RELEASE OF   CONFIDENTIAL INFORMATION    [facility],     We are seeing Miri Earl, date of birth 1980, in the clinic at [facility]. Noemi Trammell NP is the patient's PCP. Miri Earl has an outstanding lab/procedure at the time we reviewed her chart. In order to help keep her health information updated, she has authorized us to request the following medical record(s):        EYE EXAM                Please fax records to [facility], Elizabeth T Buras, NP, [phone]     If you have any questions, please contact Hortencia Reich, Care Coordinator   at [phone].            Patient Name: Miri Earl  : 1980  Patient Phone #: [phone]

## 2022-11-03 NOTE — LETTER
AUTHORIZATION FOR RELEASE OF   CONFIDENTIAL INFORMATION    [facility]    We are seeing Miri Earl, date of birth 1980, in the clinic at [facility]. Noemi Trammell NP is the patient's PCP. Miri Earl has an outstanding lab/procedure at the time we reviewed her chart. In order to help keep her health information updated, she has authorized us to request the following medical record(s):       EYE EXAM                  Please fax records to [facility], Elizabeth T Buras, NP, [phone]     If you have any questions, please contact Hortencia Reich, Care Coordinator   at [phone].            Patient Name: Miri Earl  : 1980  Patient Phone #: [phone]

## 2023-01-05 ENCOUNTER — PATIENT MESSAGE (OUTPATIENT)
Dept: FAMILY MEDICINE | Facility: CLINIC | Age: 43
End: 2023-01-05
Payer: COMMERCIAL

## 2023-01-05 RX ORDER — POTASSIUM CHLORIDE 750 MG/1
20 TABLET, EXTENDED RELEASE ORAL DAILY
Qty: 60 TABLET | Refills: 3 | Status: SHIPPED | OUTPATIENT
Start: 2023-01-05 | End: 2023-04-17

## 2023-02-04 LAB
LEFT EYE DM RETINOPATHY: NEGATIVE
RIGHT EYE DM RETINOPATHY: NEGATIVE

## 2023-02-15 ENCOUNTER — PATIENT MESSAGE (OUTPATIENT)
Dept: FAMILY MEDICINE | Facility: CLINIC | Age: 43
End: 2023-02-15
Payer: COMMERCIAL

## 2023-02-17 ENCOUNTER — PATIENT MESSAGE (OUTPATIENT)
Dept: GASTROENTEROLOGY | Facility: CLINIC | Age: 43
End: 2023-02-17
Payer: COMMERCIAL

## 2023-02-17 DIAGNOSIS — R12 HEARTBURN: ICD-10-CM

## 2023-02-17 RX ORDER — DEXLANSOPRAZOLE 60 MG/1
60 CAPSULE, DELAYED RELEASE ORAL
Qty: 30 CAPSULE | Refills: 11 | Status: SHIPPED | OUTPATIENT
Start: 2023-02-17 | End: 2024-03-20 | Stop reason: SDUPTHER

## 2023-02-17 RX ORDER — FAMOTIDINE 40 MG/1
40 TABLET, FILM COATED ORAL NIGHTLY
Qty: 60 TABLET | Refills: 5 | Status: SHIPPED | OUTPATIENT
Start: 2023-02-17 | End: 2024-03-07

## 2023-02-24 ENCOUNTER — PATIENT OUTREACH (OUTPATIENT)
Dept: ADMINISTRATIVE | Facility: HOSPITAL | Age: 43
End: 2023-02-24
Payer: COMMERCIAL

## 2023-03-23 ENCOUNTER — OFFICE VISIT (OUTPATIENT)
Dept: GASTROENTEROLOGY | Facility: CLINIC | Age: 43
End: 2023-03-23
Payer: COMMERCIAL

## 2023-03-23 VITALS — HEIGHT: 68 IN | BODY MASS INDEX: 3.68 KG/M2 | WEIGHT: 24.25 LBS

## 2023-03-23 DIAGNOSIS — K21.9 GASTROESOPHAGEAL REFLUX DISEASE, UNSPECIFIED WHETHER ESOPHAGITIS PRESENT: Primary | ICD-10-CM

## 2023-03-23 PROCEDURE — 99999 PR PBB SHADOW E&M-EST. PATIENT-LVL IV: CPT | Mod: PBBFAC,,, | Performed by: INTERNAL MEDICINE

## 2023-03-23 PROCEDURE — 99999 PR PBB SHADOW E&M-EST. PATIENT-LVL IV: ICD-10-PCS | Mod: PBBFAC,,, | Performed by: INTERNAL MEDICINE

## 2023-03-23 PROCEDURE — 1159F MED LIST DOCD IN RCRD: CPT | Mod: CPTII,S$GLB,, | Performed by: INTERNAL MEDICINE

## 2023-03-23 PROCEDURE — 4010F PR ACE/ARB THEARPY RXD/TAKEN: ICD-10-PCS | Mod: CPTII,S$GLB,, | Performed by: INTERNAL MEDICINE

## 2023-03-23 PROCEDURE — 1160F RVW MEDS BY RX/DR IN RCRD: CPT | Mod: CPTII,S$GLB,, | Performed by: INTERNAL MEDICINE

## 2023-03-23 PROCEDURE — 1159F PR MEDICATION LIST DOCUMENTED IN MEDICAL RECORD: ICD-10-PCS | Mod: CPTII,S$GLB,, | Performed by: INTERNAL MEDICINE

## 2023-03-23 PROCEDURE — 99213 PR OFFICE/OUTPT VISIT, EST, LEVL III, 20-29 MIN: ICD-10-PCS | Mod: S$GLB,,, | Performed by: INTERNAL MEDICINE

## 2023-03-23 PROCEDURE — 4010F ACE/ARB THERAPY RXD/TAKEN: CPT | Mod: CPTII,S$GLB,, | Performed by: INTERNAL MEDICINE

## 2023-03-23 PROCEDURE — 1160F PR REVIEW ALL MEDS BY PRESCRIBER/CLIN PHARMACIST DOCUMENTED: ICD-10-PCS | Mod: CPTII,S$GLB,, | Performed by: INTERNAL MEDICINE

## 2023-03-23 PROCEDURE — 3008F BODY MASS INDEX DOCD: CPT | Mod: CPTII,S$GLB,, | Performed by: INTERNAL MEDICINE

## 2023-03-23 PROCEDURE — 99213 OFFICE O/P EST LOW 20 MIN: CPT | Mod: S$GLB,,, | Performed by: INTERNAL MEDICINE

## 2023-03-23 PROCEDURE — 3008F PR BODY MASS INDEX (BMI) DOCUMENTED: ICD-10-PCS | Mod: CPTII,S$GLB,, | Performed by: INTERNAL MEDICINE

## 2023-03-23 RX ORDER — NALOXONE HYDROCHLORIDE 4 MG/.1ML
SPRAY NASAL
COMMUNITY
Start: 2022-11-23

## 2023-03-23 NOTE — PROGRESS NOTES
Subjective:       Patient ID: April D Rajat is a 42 y.o. female.    Chief Complaint: Gastroesophageal Reflux    Ms. Earl presents for follow-up of her reflux.  She reports she is doing well at this time.  Denies any significant heartburn or reflux sensation.  Denies dysphagia.  She is tolerating the Dexilant 60 mg a.m. and Pepcid at night.  And see the weight table below.  She is been able to lose approximately 15 lb.        See recent Upper GI endoscopy 5/4/2022:  Indications:           Pharyngeal phase dysphagia, Esophageal reflux, Nausea   Comorbidities        Hypertension, Morbid obesity (BMI 39), Hyperlipidemia, Asthma   Impression:            - Normal oropharynx.                          - Normal cricopharyngeus.                          - Normal esophagus.                          - Z-line regular, 39 cm from the incisors.                          - Patulous lower esophageal sphincter.                          - No endoscopic esophageal abnormality to explain                          patient's dysphagia. Esophagus dilated, 54 Fr.                          - Normal cardia.                          - Slight antritis. Biopsied.                          - Normal stomach otherwise.                          - Normal pylorus.                          - Normal examined duodenum.                          - Normal major papilla.   Recommendation:        - Discharge patient to home.                          - Follow an antireflux regimen.                          - Exercise and weight loss.                          - Continue present medications.                          - Use Dexilant (dexlansoprazole) 60 mg PO daily.                          - Use Pepcid (famotidine) 40 mg PO nightly.   Earl Colunga MD   5/4/2022    RELIAPATH DIAGNOSIS:   STOMACH, PREPYLORIC, BIOPSY:   -  Benign antral mucosa showing mild chronic inactive gastritis with focal   intestinal metaplasia       (complete type), negative for  dysplasia.   -  No Helicobacter organisms identified by immunostain.         Wt Readings from Last 20 Encounters:  03/23/23 : 11 kg (24 lb 4 oz)  11/02/22 : 112.8 kg (248 lb 9.6 oz)  10/31/22 : 112.5 kg (248 lb 2 oz)  10/29/22 : 112.5 kg (248 lb 0.3 oz)  10/11/22 : 112.5 kg (248 lb)  09/29/22 : 114.9 kg (253 lb 4.9 oz)  09/25/22 : 113.9 kg (251 lb 1.7 oz)  09/12/22 : 113.9 kg (250 lb 15.9 oz)  06/21/22 : 119.3 kg (263 lb)  05/19/22 : 119.3 kg (263 lb 1.9 oz)  05/03/22 : 117.9 kg (260 lb)  03/02/22 : 117.9 kg (260 lb)  03/02/22 : 117.9 kg (260 lb)  10/05/21 : 121.6 kg (268 lb)  07/22/21 : 128.4 kg (283 lb 1.1 oz)  07/06/21 : 128 kg (282 lb 3 oz)  06/16/21 : 131.5 kg (290 lb)  06/08/21 : 129.3 kg (285 lb 0.9 oz)  06/06/21 : 129.3 kg (285 lb)  05/25/21 : 129.3 kg (285 lb)    Review of Systems   Constitutional:  Negative for appetite change, fever and unexpected weight change.   HENT: Negative.  Negative for mouth sores, sore throat and trouble swallowing.    Eyes: Negative.    Respiratory: Negative.  Negative for cough and choking.    Cardiovascular: Negative.  Negative for chest pain and leg swelling.   Gastrointestinal:  Negative for abdominal distention, abdominal pain, anal bleeding, blood in stool, constipation, diarrhea, nausea and vomiting.   Genitourinary: Negative.    Musculoskeletal: Negative.    Integumentary:  Negative for color change and rash. Negative.   Neurological: Negative.    Hematological:  Negative for adenopathy.   Psychiatric/Behavioral: Negative.         Objective:      Physical Exam  Vitals and nursing note reviewed.   Constitutional:       General: She is not in acute distress.     Appearance: She is well-developed.   HENT:      Mouth/Throat:      Mouth: Mucous membranes are moist.      Pharynx: No oropharyngeal exudate.   Eyes:      General: No scleral icterus.  Neck:      Thyroid: No thyromegaly.      Trachea: No tracheal deviation.   Cardiovascular:      Rate and Rhythm: Normal rate and  regular rhythm.      Heart sounds: Normal heart sounds.   Pulmonary:      Effort: Pulmonary effort is normal.      Breath sounds: Normal breath sounds.   Abdominal:      General: Bowel sounds are normal. There is no distension.      Palpations: Abdomen is soft. There is no mass.      Tenderness: There is no abdominal tenderness. There is no guarding.   Lymphadenopathy:      Cervical: No cervical adenopathy.   Skin:     General: Skin is warm and dry.      Findings: No rash.   Neurological:      General: No focal deficit present.      Mental Status: She is alert and oriented to person, place, and time.   Psychiatric:         Mood and Affect: Mood normal.         Behavior: Behavior normal.       Assessment:         Gastroesophageal reflux disease, unspecified whether esophagitis present      Plan:       Continue meds the same.    Continue with diet and weight loss.  RTC 6 months.

## 2023-04-20 DIAGNOSIS — E11.9 TYPE 2 DIABETES MELLITUS WITHOUT COMPLICATION: ICD-10-CM

## 2023-04-25 ENCOUNTER — PATIENT MESSAGE (OUTPATIENT)
Dept: ADMINISTRATIVE | Facility: HOSPITAL | Age: 43
End: 2023-04-25
Payer: COMMERCIAL

## 2023-04-25 ENCOUNTER — PATIENT MESSAGE (OUTPATIENT)
Dept: FAMILY MEDICINE | Facility: CLINIC | Age: 43
End: 2023-04-25
Payer: COMMERCIAL

## 2023-04-25 ENCOUNTER — PATIENT OUTREACH (OUTPATIENT)
Dept: ADMINISTRATIVE | Facility: HOSPITAL | Age: 43
End: 2023-04-25
Payer: COMMERCIAL

## 2023-04-25 DIAGNOSIS — Z00.00 LABORATORY EXAM ORDERED AS PART OF ROUTINE GENERAL MEDICAL EXAMINATION: Primary | ICD-10-CM

## 2023-04-25 DIAGNOSIS — E11.9 TYPE 2 DIABETES MELLITUS WITHOUT COMPLICATION, WITHOUT LONG-TERM CURRENT USE OF INSULIN: ICD-10-CM

## 2023-04-25 NOTE — PROGRESS NOTES
DIABETIC TESTING DUE    Non-compliant report chart audits for DIABETIC LAB TEST    Outreach to patient in reference to SCHEDULING A LAB APPOINTMENT    WEEKLY BULK ORDER REPORT.  ORDER PLACED

## 2023-06-01 DIAGNOSIS — Z98.1 ARTHRODESIS STATUS: Primary | ICD-10-CM

## 2023-06-02 ENCOUNTER — PATIENT MESSAGE (OUTPATIENT)
Dept: FAMILY MEDICINE | Facility: CLINIC | Age: 43
End: 2023-06-02
Payer: COMMERCIAL

## 2023-06-02 DIAGNOSIS — Z12.39 ENCOUNTER FOR SCREENING FOR MALIGNANT NEOPLASM OF BREAST, UNSPECIFIED SCREENING MODALITY: Primary | ICD-10-CM

## 2023-06-09 DIAGNOSIS — Z98.1 ARTHRODESIS STATUS: Primary | ICD-10-CM

## 2023-06-12 ENCOUNTER — HOSPITAL ENCOUNTER (OUTPATIENT)
Dept: RADIOLOGY | Facility: HOSPITAL | Age: 43
Discharge: HOME | End: 2023-06-12
Attending: ORTHOPAEDIC SURGERY
Payer: COMMERCIAL

## 2023-06-12 DIAGNOSIS — Z98.1 ARTHRODESIS STATUS: ICD-10-CM

## 2023-06-12 PROCEDURE — 72141 MRI NECK SPINE W/O DYE: CPT | Mod: TC,PO

## 2023-06-15 ENCOUNTER — PATIENT MESSAGE (OUTPATIENT)
Dept: GASTROENTEROLOGY | Facility: CLINIC | Age: 43
End: 2023-06-15
Payer: COMMERCIAL

## 2023-06-26 ENCOUNTER — PATIENT MESSAGE (OUTPATIENT)
Dept: GASTROENTEROLOGY | Facility: CLINIC | Age: 43
End: 2023-06-26
Payer: COMMERCIAL

## 2023-06-29 RX ORDER — POTASSIUM CHLORIDE 750 MG/1
TABLET, EXTENDED RELEASE ORAL
Qty: 180 TABLET | Refills: 3 | Status: SHIPPED | OUTPATIENT
Start: 2023-06-29

## 2023-07-11 ENCOUNTER — PATIENT MESSAGE (OUTPATIENT)
Dept: GASTROENTEROLOGY | Facility: CLINIC | Age: 43
End: 2023-07-11
Payer: COMMERCIAL

## 2023-07-11 DIAGNOSIS — K59.00 SIMPLE CONSTIPATION: Primary | ICD-10-CM

## 2023-07-11 NOTE — TELEPHONE ENCOUNTER
"Noted.    We can try the Linzess.  Will start at the middle dose (145 mcg), and see what kind of response there is.  If less than wanted, after 2 weeks, could go up to 290 mcg.  Let us know the reponse, so I can know what dose to refill.    But also, I'd suggest a daily dose of Miralax, to see if "lubricating" the colon wall might help with the novements.  "

## 2023-07-12 ENCOUNTER — PATIENT MESSAGE (OUTPATIENT)
Dept: GASTROENTEROLOGY | Facility: CLINIC | Age: 43
End: 2023-07-12
Payer: COMMERCIAL

## 2023-07-19 ENCOUNTER — TELEPHONE (OUTPATIENT)
Dept: GASTROENTEROLOGY | Facility: CLINIC | Age: 43
End: 2023-07-19
Payer: COMMERCIAL

## 2023-07-20 ENCOUNTER — TELEPHONE (OUTPATIENT)
Dept: GASTROENTEROLOGY | Facility: CLINIC | Age: 43
End: 2023-07-20
Payer: COMMERCIAL

## 2023-07-20 NOTE — TELEPHONE ENCOUNTER
Received faxed approval from insurance on [employer] with review case number [id number]  The request for the above stated medication has been approved from 07/20/2023 to open ended.  
Alert and oriented to person, place and time

## 2023-07-25 ENCOUNTER — OFFICE VISIT (OUTPATIENT)
Dept: HEPATOLOGY | Facility: CLINIC | Age: 43
End: 2023-07-25
Payer: COMMERCIAL

## 2023-07-25 ENCOUNTER — TELEPHONE (OUTPATIENT)
Dept: HEPATOLOGY | Facility: CLINIC | Age: 43
End: 2023-07-25

## 2023-07-25 DIAGNOSIS — I10 PRIMARY HYPERTENSION: ICD-10-CM

## 2023-07-25 DIAGNOSIS — L29.9 PRURITUS: ICD-10-CM

## 2023-07-25 DIAGNOSIS — R79.89 ELEVATED LFTS: Primary | ICD-10-CM

## 2023-07-25 DIAGNOSIS — E66.01 CLASS 3 SEVERE OBESITY IN ADULT, UNSPECIFIED BMI, UNSPECIFIED OBESITY TYPE, UNSPECIFIED WHETHER SERIOUS COMORBIDITY PRESENT: ICD-10-CM

## 2023-07-25 DIAGNOSIS — K75.81 NASH (NONALCOHOLIC STEATOHEPATITIS): ICD-10-CM

## 2023-07-25 DIAGNOSIS — K74.00 LIVER FIBROSIS: ICD-10-CM

## 2023-07-25 DIAGNOSIS — E78.2 MIXED HYPERLIPIDEMIA: ICD-10-CM

## 2023-07-25 DIAGNOSIS — E11.9 TYPE 2 DIABETES MELLITUS WITHOUT COMPLICATION, WITHOUT LONG-TERM CURRENT USE OF INSULIN: ICD-10-CM

## 2023-07-25 PROCEDURE — 3044F HG A1C LEVEL LT 7.0%: CPT | Mod: CPTII,95,, | Performed by: PHYSICIAN ASSISTANT

## 2023-07-25 PROCEDURE — 4010F PR ACE/ARB THEARPY RXD/TAKEN: ICD-10-PCS | Mod: CPTII,95,, | Performed by: PHYSICIAN ASSISTANT

## 2023-07-25 PROCEDURE — 3061F PR NEG MICROALBUMINURIA RESULT DOCUMENTED/REVIEW: ICD-10-PCS | Mod: CPTII,95,, | Performed by: PHYSICIAN ASSISTANT

## 2023-07-25 PROCEDURE — 1160F RVW MEDS BY RX/DR IN RCRD: CPT | Mod: CPTII,95,, | Performed by: PHYSICIAN ASSISTANT

## 2023-07-25 PROCEDURE — 3044F PR MOST RECENT HEMOGLOBIN A1C LEVEL <7.0%: ICD-10-PCS | Mod: CPTII,95,, | Performed by: PHYSICIAN ASSISTANT

## 2023-07-25 PROCEDURE — 99214 PR OFFICE/OUTPT VISIT, EST, LEVL IV, 30-39 MIN: ICD-10-PCS | Mod: 95,,, | Performed by: PHYSICIAN ASSISTANT

## 2023-07-25 PROCEDURE — 3066F NEPHROPATHY DOC TX: CPT | Mod: CPTII,95,, | Performed by: PHYSICIAN ASSISTANT

## 2023-07-25 PROCEDURE — 4010F ACE/ARB THERAPY RXD/TAKEN: CPT | Mod: CPTII,95,, | Performed by: PHYSICIAN ASSISTANT

## 2023-07-25 PROCEDURE — 1160F PR REVIEW ALL MEDS BY PRESCRIBER/CLIN PHARMACIST DOCUMENTED: ICD-10-PCS | Mod: CPTII,95,, | Performed by: PHYSICIAN ASSISTANT

## 2023-07-25 PROCEDURE — 3061F NEG MICROALBUMINURIA REV: CPT | Mod: CPTII,95,, | Performed by: PHYSICIAN ASSISTANT

## 2023-07-25 PROCEDURE — 1159F MED LIST DOCD IN RCRD: CPT | Mod: CPTII,95,, | Performed by: PHYSICIAN ASSISTANT

## 2023-07-25 PROCEDURE — 99214 OFFICE O/P EST MOD 30 MIN: CPT | Mod: 95,,, | Performed by: PHYSICIAN ASSISTANT

## 2023-07-25 PROCEDURE — 1159F PR MEDICATION LIST DOCUMENTED IN MEDICAL RECORD: ICD-10-PCS | Mod: CPTII,95,, | Performed by: PHYSICIAN ASSISTANT

## 2023-07-25 PROCEDURE — 3066F PR DOCUMENTATION OF TREATMENT FOR NEPHROPATHY: ICD-10-PCS | Mod: CPTII,95,, | Performed by: PHYSICIAN ASSISTANT

## 2023-07-25 NOTE — PROGRESS NOTES
The patient location is: [place], [place]   The chief complaint leading to consultation is: elevated LFTs     Visit type: audiovisual    Face to Face time with patient: 20  30 minutes of total time spent on the encounter, which includes face to face time and non-face to face time preparing to see the patient (eg, review of tests), Obtaining and/or reviewing separately obtained history, Documenting clinical information in the electronic or other health record, Independently interpreting results (not separately reported) and communicating results to the patient/family/caregiver, or Care coordination (not separately reported).     Each patient to whom he or she provides medical services by telemedicine is:  (1) informed of the relationship between the physician and patient and the respective role of any other health care provider with respect to management of the patient; and (2) notified that he or she may decline to receive medical services by telemedicine and may withdraw from such care at any time.    Notes:     Hepatology Consultation: [facility]    ESTABLISHED PATIENT    Subjective      Ms. Earl is a 42 y.o. female who returns for continued evalation of elevated LFTs and hepatic steatosis. LFT review suggest mixed picture with mild alk phos elevations as well as ALT>AST; recently albumin a bit low. Plt WNL. Hepatomegaly + steatosis, without splenic enlargement on ultrasound 4/6/2021     RF for fatty liver include HTN, HLD, T2DM,   Weight gain in the past year or so, BMI 42  CCY 2007 8 back surgeries total -- she has a pain pump for this    We obtained a liver biopsy due to worsening elevated liver enzymes and MRE that was nondiagnostic due to patient's pan pump 5/14/2021.    Liver biopsy 6/16/2021  Final Pathologic Diagnosis Native liver (random biopsy):   - Macrovesicular steatosis 70%, microvesicular steatosis 15%, with   steatohepatitis (see CODI grading form below)   -  Steatohepatitis score 7, stage 2   - Negative for Ivett-Denk bodies   - Iron: No deposit (Grade 0/4)   - Trichrome: Stage 2/4 fibrosis   - Iron and trichrome stains with appropriate controls   CODI grade score 7/8   Fatty liver 3 (>67%)   Inflammation 2 (2 to 4 foci)   Balloon cells 2 (many)      She was thereafter considered for clinical trial but was not a candidate due to use of Ozempic and that she had lost significant amount of weight.     She lost weight at follow-up visit 12/2021 with improvement of liver enzymes. Switched to tradjenta with success. Unable to consult with nutritionist since.      Interval history 07/25/2022:  Miri Earl arrives today alone on video   She denies symptoms of hepatic decompensation including jaundice, abdominal distention or lower extremity swelling, hematemesis, melena, or periods of confusion suggestive of hepatic encephalopathy.  She continues tradjenta - working well and is currently at 255 lbs -- she has plateaued. Is no longer keeping a food diary.   Reflux has worsened , continued stomach pain - seeing GI  Fatigue is still there.      Interval history 07/25/2023:  Miri Earl arrives today alone on video  She is down to 239 lbs. Feeling good from that standpoint.   She did have her pain stimulator replaced last year  She continues to suffer with constipation and seeing gastro for this - now is going daily with Linzess.  Denies symptoms of hepatic decompensation including jaundice, ascites, cognitive problems to suggest hepatic encephalopathy, or GI bleeding.   She reports significant nighttime itching 2-3 weeks, on a daily antihistamine Zyrtec  Nerve block prior to liver labs - took benadryl prior       ETOH: rarely   Smoking: no   Illicit substances: no   Social: lives at home with young children   Prior liver biopsy: yes, 2001 @ [facility]   Prior liver disease: yes, fatty liver   FAMILY HISTORY: denies a history of liver cancer, cirrhosis, hepatitis; but  mother w/ fatty liver.      PMH April has a past medical history of Anxiety, Arthritis, Asthma, Breast cyst, Chronic back pain, Colon polyp, Depression, Diabetes mellitus, Elevated liver enzymes, Fibrocystic breast, Fibromyalgia, GERD (gastroesophageal reflux disease), History of Chiari malformation, History of colitis, History of gastritis, Hypertension, Hypokalemia, IBS (irritable bowel syndrome), Insomnia, MVP (mitral valve prolapse), HERNANDEZ (nonalcoholic steatohepatitis) (2021), Neuropathy, Postmenopausal HRT (hormone replacement therapy), Seasonal allergies, Sleep apnea with use of continuous positive airway pressure (CPAP), Spondylitis, and Thyroid nodule.   PSXH April has a past surgical history that includes Ganglion cyst excision (Right); Hysterectomy (10/2007  Manisha); Appendectomy (10/15/2009  Kristen);  section; Cholecystectomy; Back surgery; Lumbar discectomy; Lumbar fusion; Colonoscopy w/ polypectomy (10/31/2008  Keanu); Liver biopsy (10/20/2008); Pelvic laparoscopy; Spinal cord stimulator implant; Back surgery; Back surgery; pain pump; neurostimulator removal ; Oophorectomy; Myelography (N/A, 2019); Selective injection of anesthetic agent around lumbar spinal nerve root by transforaminal approach (Bilateral, 2019); Esophagogastroduodenoscopy (08/15/2014); Upper gastrointestinal endoscopy (10/31/2008  Keanu); Esophagogastroduodenoscopy (N/A, 10/21/2020); Colonoscopy (  [place]); Colonoscopy (08/15/2014); Colonoscopy (N/A, 10/21/2020); and Esophagogastroduodenoscopy (N/A, 2022).    April's family history includes Diabetes in her father and mother; Hypertension in her father and mother.    April reports that she has been smoking cigarettes. She has a 17.00 pack-year smoking history. She has never used smokeless tobacco. She reports that she does not currently use alcohol. She reports current drug use. Drugs: Oxycodone and Morphine.   ALG April is allergic to  doxycycline; iodinated contrast media; latex, natural rubber; shellfish containing products; and morphine.   MED April has a current medication list which includes the following prescription(s): albuterol, atorvastatin, budesonide, cetirizine, cyanocobalamin, dexlansoprazole, epinephrine, famotidine, gabapentin, levalbuterol, linaclotide, lisinopril, metoprolol succinate, montelukast, morphine, naloxone, ondansetron, oxycodone, potassium chloride, promethazine, sodium chloride 0.9% 0.9 % SolP with bupivacaine 0.5% (5 mg/ml) 0.5 % (5 mg/mL) Soln 0.1 %, symproic, and tradjenta.       ROS:   As per hpi     Objective     Physical exam is limited by video visit:   Friendly, in no acute distress; alert and oriented to person, place and time  VITALS: There were no vitals taken for this visit.   SKIN/EYES: No obvious jaundice or yellowing of the eyes.   HENT: Normocephalic, without obvious abnormality.   NECK: Supple.   CARDIOVASCULAR: ANITA on video visit  RESPIRATORY: Normal respiratory effort.   GI: ANITA hepatosplenomegaly  PSYCH: Thought and speech pattern appropriate.     Lab Results   Component Value Date     (H) 07/18/2023    AST 39 (H) 07/18/2023    ALKPHOS 164 (H) 07/18/2023    BILITOT 0.4 07/18/2023    ALBUMIN 3.7 07/18/2023    INR 1.0 10/07/2022     04/28/2023     Lab Results   Component Value Date    AFP <2.0 07/18/2023         Prior serologic workup:   Lab Results   Component Value Date    SMOOTHMUSCAB <1:20 06/07/2021    AMAIFA Negative 10/14/2008    IGGSERUM 1004 06/07/2021    ANASCREEN None Detected 06/07/2021    FERRITIN 138 (H) 06/07/2021    HEPBSAG Negative 07/18/2022    HEPCAB Negative 07/18/2022    HEPAIGM Negative 09/21/2020       US Abdomen Limited  EXAMINATION:  US ABDOMEN LIMITED 07/18/2022 at 09:39    INDICATION:  Clinical history is provided of hepatic steatosis, abnormal laboratory values.  Previous cholecystectomy.  No history of recent trauma or surgery is provided.    COMPARISON  CT  abdomen report of 07/12/2021.  Abdominal ultrasound report of 04/06/2021.    FINDINGS  No free fluid collections are appreciated. The liver measures 19.8 cm and demonstrates heterogeneous, hyperechoic echogenicity.  No gross contour deformity is appreciated.  Portal venous flow is within normal.  The common bile duct measures 6 mm.  The pancreas is largely obscured. There is bowel gas, penetration artifact present.  No fluid-filled gallbladder is appreciated at the anatomic location corresponding to the clinical history provided.  No history of tenderness is provided. The right kidney measures 13.5 x 5.4 x 5.2 cm. No echogenic obstructive calculus or hydronephrosis is appreciated. The proximal aorta measures 1.9 cm, mid 1.5 cm and distal 1.2 cm with unremarkable adjacent IVC color Doppler flow demonstrated.  No other significant interval changes are appreciated.    IMPRESSION  1.  No free fluid collection is appreciated.  2. There is heterogeneous, hyperechoic echotexture of the liver overall similar.  This could be seen with steatosis, chronic hepatic changes.  3.  No echogenic obstructive calculus, biliary dilatation or hydronephrosis is appreciated.    Electronically signed by: Juan Humphrey MD  Date:    07/18/2022  Time:    10:59    US Abdomen Limited  Narrative: EXAMINATION:  US ABDOMEN LIMITED    CLINICAL HISTORY:  Nonalcoholic steatohepatitis (HERNANDEZ)    COMPARISON:  07/18/2022    FINDINGS:  Liver measures 18.2 cm with increased echogenicity.  Contour is normal.  No focal hepatic mass or intrahepatic ductal dilatation.  Normal directional flow is in the main portal vein.    Pancreas, abdominal aorta and IVC are normal.    Gallbladder is surgically absent.  Common bile duct measures 0.5 cm.    Right kidney measures 12.4 cm.  No stones or hydronephrosis.  Renal echogenicity is normal.  Impression: Hepatic enlargement with diffuse steatosis    Electronically signed by: Valente Robbins  MD  Date:    07/18/2023  Time:    11:02        Assessment/Plan     42 y.o. White female with:    1. HERNANDEZ, biopsy proven, with stage 2 fibrosis [elevated LFTs]  - 12/2021: LFTs improved with 40 lb weight loss. Encouraged to keep going.   - 7/2022: continued improvement in LFTs. Recently was on augmentin for sinus infection which could explain mild rise in AST/ALT.   - not a candidate for clinical trials as she started ozempic, then switched to tradjenta with significant weight loss  - recommend HCC screening annually   - vaccinated against HBV. Needs HAV, however with latex allergy. Will contact [facility] to ensure they have HAVRIX and can use non-latex covered needle. 2022 update: unable to source, did not complete.    2. Hepatomegaly, secondary to HERNANDEZ   - 24cm measurement on MRE 5/2021  - 19 cm measurement on ultrasound 7/2022, improved with weight loss.     3. Metabolic syndrome   - with 40 lb weight loss since last visit  - on tradjenta  - f/w endocrine for thyroid disease  - 7/2022: a1c has improved as well     4. Itching    Orders Placed This Encounter   Procedures    FibroScan [place] (Vibration Controlled Transient Elastography)    Hepatic Function Panel    BILE ACIDS, CHOLYLGLYCINE       Congratulated on continued weight loss and a1c improvement. Her liver size continues to improve.   She reports recent itching and enzymes are up. Recommend repeat labs now.  ?opioid related  Ultrasound and AFP WNL otherwise.  Fibroscan needed now.   Follow-up in 3 weeks.      I spent 30 minutes on the day of this encounter preparing for, evaluating, treating, and managing this patient.     Thank you for allowing me to participate in the care of Miri Sen PA-C  Hepatology Advanced Practice Provider  [facility]

## 2023-08-01 ENCOUNTER — LAB VISIT (OUTPATIENT)
Dept: LAB | Facility: HOSPITAL | Age: 43
End: 2023-08-01
Attending: PHYSICIAN ASSISTANT
Payer: COMMERCIAL

## 2023-08-01 DIAGNOSIS — R79.89 ELEVATED LFTS: ICD-10-CM

## 2023-08-01 DIAGNOSIS — L29.9 PRURITUS: ICD-10-CM

## 2023-08-01 DIAGNOSIS — K75.81 NASH (NONALCOHOLIC STEATOHEPATITIS): ICD-10-CM

## 2023-08-01 LAB
ALBUMIN SERPL BCP-MCNC: 3.6 G/DL (ref 3.5–5.2)
ALP SERPL-CCNC: 178 U/L (ref 55–135)
ALT SERPL W/O P-5'-P-CCNC: 94 U/L (ref 10–44)
AST SERPL-CCNC: 36 U/L (ref 10–40)
BILIRUB DIRECT SERPL-MCNC: 0.3 MG/DL (ref 0.1–0.3)
BILIRUB SERPL-MCNC: 0.6 MG/DL (ref 0.1–1)
PROT SERPL-MCNC: 7.3 G/DL (ref 6–8.4)

## 2023-08-01 PROCEDURE — 36415 COLL VENOUS BLD VENIPUNCTURE: CPT | Mod: PO | Performed by: PHYSICIAN ASSISTANT

## 2023-08-01 PROCEDURE — 80076 HEPATIC FUNCTION PANEL: CPT | Performed by: PHYSICIAN ASSISTANT

## 2023-08-01 PROCEDURE — 82239 BILE ACIDS TOTAL: CPT | Performed by: PHYSICIAN ASSISTANT

## 2023-08-03 LAB — BILE AC SERPL-SCNC: 8 MCMOL/L

## 2023-08-10 ENCOUNTER — OFFICE VISIT (OUTPATIENT)
Dept: HEPATOLOGY | Facility: CLINIC | Age: 43
End: 2023-08-10
Payer: COMMERCIAL

## 2023-08-10 ENCOUNTER — PROCEDURE VISIT (OUTPATIENT)
Dept: HEPATOLOGY | Facility: CLINIC | Age: 43
End: 2023-08-10
Payer: COMMERCIAL

## 2023-08-10 VITALS — BODY MASS INDEX: 36.75 KG/M2 | HEIGHT: 68 IN | WEIGHT: 242.5 LBS

## 2023-08-10 DIAGNOSIS — K75.81 NASH (NONALCOHOLIC STEATOHEPATITIS): Primary | ICD-10-CM

## 2023-08-10 DIAGNOSIS — E66.01 CLASS 3 SEVERE OBESITY IN ADULT, UNSPECIFIED BMI, UNSPECIFIED OBESITY TYPE, UNSPECIFIED WHETHER SERIOUS COMORBIDITY PRESENT: ICD-10-CM

## 2023-08-10 DIAGNOSIS — I10 PRIMARY HYPERTENSION: ICD-10-CM

## 2023-08-10 DIAGNOSIS — K74.00 LIVER FIBROSIS: ICD-10-CM

## 2023-08-10 DIAGNOSIS — K75.81 NASH (NONALCOHOLIC STEATOHEPATITIS): ICD-10-CM

## 2023-08-10 DIAGNOSIS — E78.2 MIXED HYPERLIPIDEMIA: ICD-10-CM

## 2023-08-10 DIAGNOSIS — R16.0 HEPATOMEGALY: ICD-10-CM

## 2023-08-10 DIAGNOSIS — E11.9 TYPE 2 DIABETES MELLITUS WITHOUT COMPLICATION, WITHOUT LONG-TERM CURRENT USE OF INSULIN: ICD-10-CM

## 2023-08-10 DIAGNOSIS — Z91.040 LATEX ALLERGY STATUS: ICD-10-CM

## 2023-08-10 DIAGNOSIS — R79.89 ELEVATED LFTS: ICD-10-CM

## 2023-08-10 PROCEDURE — 76981 FIBROSCAN NEW ORLEANS (VIBRATION CONTROLLED TRANSIENT ELASTOGRAPHY): ICD-10-PCS | Mod: S$GLB,,, | Performed by: PHYSICIAN ASSISTANT

## 2023-08-10 PROCEDURE — 1159F MED LIST DOCD IN RCRD: CPT | Mod: CPTII,S$GLB,, | Performed by: PHYSICIAN ASSISTANT

## 2023-08-10 PROCEDURE — 99214 OFFICE O/P EST MOD 30 MIN: CPT | Mod: S$GLB,,, | Performed by: PHYSICIAN ASSISTANT

## 2023-08-10 PROCEDURE — 3061F PR NEG MICROALBUMINURIA RESULT DOCUMENTED/REVIEW: ICD-10-PCS | Mod: CPTII,S$GLB,, | Performed by: PHYSICIAN ASSISTANT

## 2023-08-10 PROCEDURE — 3008F PR BODY MASS INDEX (BMI) DOCUMENTED: ICD-10-PCS | Mod: CPTII,S$GLB,, | Performed by: PHYSICIAN ASSISTANT

## 2023-08-10 PROCEDURE — 99999 PR PBB SHADOW E&M-EST. PATIENT-LVL V: ICD-10-PCS | Mod: PBBFAC,,, | Performed by: PHYSICIAN ASSISTANT

## 2023-08-10 PROCEDURE — 1160F PR REVIEW ALL MEDS BY PRESCRIBER/CLIN PHARMACIST DOCUMENTED: ICD-10-PCS | Mod: CPTII,S$GLB,, | Performed by: PHYSICIAN ASSISTANT

## 2023-08-10 PROCEDURE — 3061F NEG MICROALBUMINURIA REV: CPT | Mod: CPTII,S$GLB,, | Performed by: PHYSICIAN ASSISTANT

## 2023-08-10 PROCEDURE — 3066F PR DOCUMENTATION OF TREATMENT FOR NEPHROPATHY: ICD-10-PCS | Mod: CPTII,S$GLB,, | Performed by: PHYSICIAN ASSISTANT

## 2023-08-10 PROCEDURE — 3066F NEPHROPATHY DOC TX: CPT | Mod: CPTII,S$GLB,, | Performed by: PHYSICIAN ASSISTANT

## 2023-08-10 PROCEDURE — 1159F PR MEDICATION LIST DOCUMENTED IN MEDICAL RECORD: ICD-10-PCS | Mod: CPTII,S$GLB,, | Performed by: PHYSICIAN ASSISTANT

## 2023-08-10 PROCEDURE — 3044F HG A1C LEVEL LT 7.0%: CPT | Mod: CPTII,S$GLB,, | Performed by: PHYSICIAN ASSISTANT

## 2023-08-10 PROCEDURE — 99214 PR OFFICE/OUTPT VISIT, EST, LEVL IV, 30-39 MIN: ICD-10-PCS | Mod: S$GLB,,, | Performed by: PHYSICIAN ASSISTANT

## 2023-08-10 PROCEDURE — 76981 USE PARENCHYMA: CPT | Mod: S$GLB,,, | Performed by: PHYSICIAN ASSISTANT

## 2023-08-10 PROCEDURE — 4010F ACE/ARB THERAPY RXD/TAKEN: CPT | Mod: CPTII,S$GLB,, | Performed by: PHYSICIAN ASSISTANT

## 2023-08-10 PROCEDURE — 99999 PR PBB SHADOW E&M-EST. PATIENT-LVL V: CPT | Mod: PBBFAC,,, | Performed by: PHYSICIAN ASSISTANT

## 2023-08-10 PROCEDURE — 3008F BODY MASS INDEX DOCD: CPT | Mod: CPTII,S$GLB,, | Performed by: PHYSICIAN ASSISTANT

## 2023-08-10 PROCEDURE — 4010F PR ACE/ARB THEARPY RXD/TAKEN: ICD-10-PCS | Mod: CPTII,S$GLB,, | Performed by: PHYSICIAN ASSISTANT

## 2023-08-10 PROCEDURE — 1160F RVW MEDS BY RX/DR IN RCRD: CPT | Mod: CPTII,S$GLB,, | Performed by: PHYSICIAN ASSISTANT

## 2023-08-10 PROCEDURE — 3044F PR MOST RECENT HEMOGLOBIN A1C LEVEL <7.0%: ICD-10-PCS | Mod: CPTII,S$GLB,, | Performed by: PHYSICIAN ASSISTANT

## 2023-08-10 NOTE — PROCEDURES
FibroScan [place] (Vibration Controlled Transient Elastography)    Date/Time: 8/10/2023 1:30 PM    Performed by: Alice Sen PA-C  Authorized by: Alice Sen PA-C    Diagnosis:  NAFLD    Probe:  XL    Universal Protocol: Patient's identity, procedure and site were verified, confirmatory pause was performed.  Discussed procedure including risks and potential complications.  Questions answered.  Patient verbalizes understanding and wishes to proceed with VCTE.     Procedure: After providing explanations of the procedure, patient was placed in the supine position with right arm in maximum abduction to allow optimal exposure of right lateral abdomen.  Patient was briefly assessed, Testing was performed in the mid-axillary location, 50Hz Shear Wave pulses were applied and the resulting Shear Wave and Propagation Speed detected with a 3.5 MHz ultrasonic signal, using the FibroScan probe, Skin to liver capsule distance and liver parenchyma were accessed during the entire examination with the FibroScan probe, Patient was instructed to breathe normally and to abstain from sudden movements during the procedure, allowing for random measurements of liver stiffness. At least 10 Shear Waves were produced, Individual measurements of each Shear Wave were calculated.  Patient tolerated the procedure well with no complications.  Meets discharge criteria as was dismissed.  Rates pain 0 out of 10.  Patient will follow up with ordering provider to review results.    Findings  Median liver stiffness score:  10.9  CAP Reading: dB/m:  308    IQR/med %:  6  Interpretation  Fibrosis interpretation is based on medial liver stiffness - Kilopascal (kPa).    Fibrosis Stage:  F3  Steatosis interpretation is based on controlled attenuation parameter - (dB/m).    Steatosis Grade:  S3

## 2023-08-10 NOTE — PATIENT INSTRUCTIONS
Your Fibroscan today suggest advanced fibrosis, so we will now follow you every 6 months  Consider GLP-1s   Continue weight loss  Consider repeat Fibroscan  Labs and ultrasound soon   Follow-up in 6 months in [facility]        It is important to attend clinic visits every 6 months with an Ultrasound and blood tests every 6 months to screen for liver cancer (you are at risk of developing liver cancer due to scar tissue in the liver)    Signs and symptoms of worsening liver disease include jaundice, fluid in the belly (ascites), and confusion/disorientation/slowed thought processes due to hepatic encephalopathy (toxins building up because of liver problems).   You should seek medical attention if any of these things occur.    Also, possible bleeding from esophageal varices (blood vessels in the stomach and foodpipe can burst and cause fatal bleeding).  Therefore, if you have symptoms of vomiting blood, blood in your stool, dark or black stools or vomiting coffee ground vomit, YOU SHOULD GO TO THE EMERGENCY ROOM IMMEDIATELY.     Fibrosis can increase the risk of liver cancer, liver failure, and death. However, we will watch your liver function score (MELD score) closely with each clinic visit. A normal MELD score is 6, highest is 40.  We will check this with every clinic visit. A MELD 15 or higher is when we start to consider transplant because MELD 15 or higher indicates that the liver is not functioning as well       Counseling  - strict abstinence of alcohol use (includes beer, wine, and/or liquor)  - avoid non-steroidal anti-inflammatory drugs (NSAIDs) such as ibuprofen, Motrin, naprosyn, Aleve due to the risk of kidney damage  - can take acetaminophen (Tylenol), no more than 2000 mg per day  - low sodium (salt) 2 gram per day diet  - high protein diet: 1.5g/kg to prevent muscle mass loss. Recommended at least 1 protein shake daily using Premier Protein shakes    - resistance exercises for muscle strength  - avoid  raw seafoods due to the risk of fatal Vibrio vulnificus infection  - ultrasound of the liver every 6 months for liver cancer screening  - Upper endoscopy every 1-2 years to screen for varices in the stomach and esophagus      FATTY LIVER RECOMMENDATIONS:    There is no FDA approved therapy for non-alcoholic fatty liver disease (NAFLD); therefore, lifestyle changes are important:  1. Weight loss goal of 20   lbs  2. Mediterranean diet is recommended that focuses on low-carb, low-sugar, and low-processed foods.  3. Exercise, 5 days per week, 30 minutes per day, as tolerated  4. Recommend good control of cholesterol, blood pressure, blood sugar levels (as these are risk factors for fatty liver). Cholesterol lowering medications including statins are typically safe and beneficial (even if liver enzymes are elevated).    5. Limit alcohol consumption, no more than 1 serving(s) of alcohol in any day (1 serving is 5 ounces of wine, 12 ounces of beer, or 1.5 ounces of liquor). Do not recommend daily alcohol use.        In some people, fatty liver can progress to steatohepatitis (inflammatory fatty liver) and possibly to cirrhosis, putting one at increased risk for liver cancer and liver failure in the future. Lifestyle changes can help to decrease this risk.     If interested in seeing a dietician to create a weight loss plan, contact the dietician team at [facility]: [email or web address].  You can also call to schedule a consult with a dietician: [phone]. *Virtual visits are available with one of our dieticians.     If you have diabetes or high blood pressure, you can meet with a Health  through [email or web address] program. Let us know if you are interested in a referral.     Ask about our fatty liver/HERNANDEZ clinical trials if you have fibrosis / scar tissue related to fatty liver.    *If statins are being considered for HLD, statins are safe in patients with liver disease. Statins can be used to  treat dyslipidemia in patients with both NAFLD and HERNANDEZ.      FATTY LIVER DIET TIPS:    ADD OLIVE OIL. I recommend olive oil daily (in salads or when cooking)  ADD COFFEE. Black coffee has also been found to be potentially beneficial (skip the sweets and creamer). Add 1-2 cups per day,  if you are able to tolerate. Decaf is fine.  BE MINDFUL OF CARBS AND ADDED SUGARS. Try to limit your carb intake to LESS than 30-45 grams of carbs with a meal or LESS than 5-10 grams with any snack (total of any snack foods eaten during that time).   KEEP A FOOD DIARY. Use MyFitness Pal  OR LOSE IT odalis to add up your carbs through the day - the most successful folks on weight loss journey TRACK their progress and food.  LIMIT WHAT YOU ARE DRINKING. Do NOT drink any beverages with calories or carbs (this can lead to high blood sugar and weight gain). Also, some of our patients have been very successful with weight loss using the pre made/planned meal planning services that limit calories and portion size (one example is Sensible Meals but there are many out there). Unsweetened black or green tea is fine! Hibiscus tea is also great and refreshing, especially iced.     Tips for low/moderate carbohydrate diet:  3 meals a day made up of the following:  -- Green vegetables, tomatoes, mushrooms, spaghetti squash, cauliflower, meat, poultry, seafood, eggs   -- Beans (1-1.5 cups) or nuts (1/4 cup): can have 1 x a day.  -- Greek yogurt and fermented foods like kefir, kimchi, saurkraut (be careful if you have swelling issues as lots of salt can worsen swelling or blood pressure)  -- Dressings, seasonings, condiments, etc should have less than 2 g sugars.   -- 1-2 servings of citrus fruits, berries, pineapple or melon a day (1/2 cup)  -- Avoid fried foods  -- Avoid grains, rice, pasta, potatoes, bread, corn, peas, oatmeal, grits, tortillas, crackers, chips  -- No soda, sweet tea, juices or lemonade  -- Use olive/avocado oil rather than vegetable  oils or butter.        ADDITIONAL RESOURCES:  Websites with information about fatty liver and inflammation related to fatty liver (HERNANDEZ): www.nashtruth.com and www.nashactually.com   [facility]: Non-Alcoholic Fatty Liver Disease (NAFLD)    Facebook support group with tips and recipes:   Non-Alcoholic Fatty Liver Disease (NAFLD) Diet & Nutrition Support     Try [email or web address].Paxfire for recipes.

## 2023-08-10 NOTE — PROGRESS NOTES
Hepatology Consultation: [facility]    ESTABLISHED PATIENT    Subjective      Ms. Earl is a 43 y.o. female who returns for continued evalation of elevated LFTs and hepatic steatosis. LFT review suggest mixed picture with mild alk phos elevations as well as ALT>AST; recently albumin a bit low. Plt WNL. Hepatomegaly + steatosis, without splenic enlargement on ultrasound 4/6/2021     RF for fatty liver include HTN, HLD, T2DM,   Weight gain in the past year or so, BMI 42  CCY 2007 8 back surgeries total -- she has a pain pump for this    We obtained a liver biopsy due to worsening elevated liver enzymes and MRE that was nondiagnostic due to patient's pan pump 5/14/2021.    Liver biopsy 6/16/2021  Final Pathologic Diagnosis Native liver (random biopsy):   - Macrovesicular steatosis 70%, microvesicular steatosis 15%, with   steatohepatitis (see CODI grading form below)   - Steatohepatitis score 7, stage 2   - Negative for Ivett-Denk bodies   - Iron: No deposit (Grade 0/4)   - Trichrome: Stage 2/4 fibrosis   - Iron and trichrome stains with appropriate controls   CODI grade score 7/8   Fatty liver 3 (>67%)   Inflammation 2 (2 to 4 foci)   Balloon cells 2 (many)      She was thereafter considered for clinical trial but was not a candidate due to use of Ozempic and that she had lost significant amount of weight.     She lost weight at follow-up visit 12/2021 with improvement of liver enzymes. Switched to tradjenta with success. Unable to consult with nutritionist since.      Interval history 07/25/2022:  Miri Earl arrives today alone on video   She denies symptoms of hepatic decompensation including jaundice, abdominal distention or lower extremity swelling, hematemesis, melena, or periods of confusion suggestive of hepatic encephalopathy.  She continues tradjenta - working well and is currently at 255 lbs -- she has plateaued. Is no longer keeping a food diary.   Reflux has  worsened , continued stomach pain - seeing GI  Fatigue is still there.      Interval history 07/25/2023:  Miri Earl arrives today alone on video  She is down to 239 lbs. Feeling good from that standpoint.   She did have her pain stimulator replaced last year  She continues to suffer with constipation and seeing gastro for this - now is going daily with Linzess.  Denies symptoms of hepatic decompensation including jaundice, ascites, cognitive problems to suggest hepatic encephalopathy, or GI bleeding.   She reports significant nighttime itching 2-3 weeks, on a daily antihistamine Zyrtec  Nerve block prior to liver labs - took benadryl prior       Interval history 08/10/2023:  Miri Earl arrives today alone. Since our last visit, doing fine. Itching has improved and uses benadryl occasionally.  She is potentially interested in GLP-1, next a1c and endocrine meeting is in September.    Denies symptoms of hepatic decompensation including jaundice, abdominal distention or lower extremity swelling, hematemesis, melena, or periods of confusion suggestive of hepatic encephalopathy.    ETOH: rarely   Smoking: no   Illicit substances: no   Social: lives at home with young children   Prior liver biopsy: yes, 2001 @ [facility]   Prior liver disease: yes, fatty liver   FAMILY HISTORY: denies a history of liver cancer, cirrhosis, hepatitis; but mother w/ fatty liver.    PMH April has a past medical history of Anxiety, Arthritis, Asthma, Breast cyst, Chronic back pain, Colon polyp, Depression, Diabetes mellitus, Elevated liver enzymes, Fibrocystic breast, Fibromyalgia, GERD (gastroesophageal reflux disease), History of Chiari malformation, History of colitis, History of gastritis, Hypertension, Hypokalemia, IBS (irritable bowel syndrome), Insomnia, MVP (mitral valve prolapse), HERNANDEZ (nonalcoholic steatohepatitis) (6/23/2021), Neuropathy, Postmenopausal HRT (hormone replacement therapy), Seasonal allergies, Sleep apnea  with use of continuous positive airway pressure (CPAP), Spondylitis, and Thyroid nodule.   PSXH April has a past surgical history that includes Ganglion cyst excision (Right); Hysterectomy (10/2007  Manisha); Appendectomy (10/15/2009  Kristen);  section; Cholecystectomy; Back surgery; Lumbar discectomy; Lumbar fusion; Colonoscopy w/ polypectomy (10/31/2008  Keanu); Liver biopsy (10/20/2008); Pelvic laparoscopy; Spinal cord stimulator implant; Back surgery; Back surgery; pain pump; neurostimulator removal ; Oophorectomy; Myelography (N/A, 2019); Selective injection of anesthetic agent around lumbar spinal nerve root by transforaminal approach (Bilateral, 2019); Esophagogastroduodenoscopy (08/15/2014); Upper gastrointestinal endoscopy (10/31/2008  Keanu); Esophagogastroduodenoscopy (N/A, 10/21/2020); Colonoscopy (  [place]); Colonoscopy (08/15/2014); Colonoscopy (N/A, 10/21/2020); and Esophagogastroduodenoscopy (N/A, 2022).   FH April's family history includes Diabetes in her father and mother; Hypertension in her father and mother.    April reports that she has been smoking cigarettes. She has a 17.0 pack-year smoking history. She has never used smokeless tobacco. She reports that she does not currently use alcohol. She reports current drug use. Drugs: Oxycodone and Morphine.   ALG April is allergic to doxycycline; iodinated contrast media; latex, natural rubber; shellfish containing products; and morphine.   MED April has a current medication list which includes the following prescription(s): albuterol, atorvastatin, budesonide, cetirizine, cyanocobalamin, dexlansoprazole, epinephrine, famotidine, gabapentin, levalbuterol, linaclotide, lisinopril, metoprolol succinate, montelukast, morphine, naloxone, ondansetron, oxycodone, potassium chloride, promethazine, sodium chloride 0.9% 0.9 % SolP with bupivacaine 0.5% (5 mg/ml) 0.5 % (5 mg/mL) Soln 0.1 %, tradjenta, and symproic.     ROS:    As per hpi     Objective     SKIN/EYES: No obvious jaundice or yellowing of the eyes.   HENT: Normocephalic, without obvious abnormality.   NECK: Supple.   RESPIRATORY: Normal respiratory effort.   GI: Soft, non-tender. No obvious hepatomegaly. Obese abdomen.   EXT: No edema.   PSYCH: Thought and speech pattern appropriate.     Lab Results   Component Value Date    ALT 94 (H) 08/01/2023    AST 36 08/01/2023    ALKPHOS 178 (H) 08/01/2023    BILITOT 0.6 08/01/2023    ALBUMIN 3.6 08/01/2023    INR 1.0 10/07/2022     04/28/2023     Lab Results   Component Value Date    AFP <2.0 07/18/2023     Prior serologic workup:   Lab Results   Component Value Date    SMOOTHMUSCAB <1:20 06/07/2021    AMAIFA Negative 10/14/2008    IGGSERUM 1004 06/07/2021    ANASCREEN None Detected 06/07/2021    FERRITIN 138 (H) 06/07/2021    HEPBSAG Negative 07/18/2022    HEPCAB Negative 07/18/2022    HEPAIGM Negative 09/21/2020       US Abdomen Limited  EXAMINATION:  US ABDOMEN LIMITED 07/18/2022 at 09:39    INDICATION:  Clinical history is provided of hepatic steatosis, abnormal laboratory values.  Previous cholecystectomy.  No history of recent trauma or surgery is provided.    COMPARISON  CT abdomen report of 07/12/2021.  Abdominal ultrasound report of 04/06/2021.    FINDINGS  No free fluid collections are appreciated. The liver measures 19.8 cm and demonstrates heterogeneous, hyperechoic echogenicity.  No gross contour deformity is appreciated.  Portal venous flow is within normal.  The common bile duct measures 6 mm.  The pancreas is largely obscured. There is bowel gas, penetration artifact present.  No fluid-filled gallbladder is appreciated at the anatomic location corresponding to the clinical history provided.  No history of tenderness is provided. The right kidney measures 13.5 x 5.4 x 5.2 cm. No echogenic obstructive calculus or hydronephrosis is appreciated. The proximal aorta measures 1.9 cm, mid 1.5 cm and distal 1.2 cm with  unremarkable adjacent IVC color Doppler flow demonstrated.  No other significant interval changes are appreciated.    IMPRESSION  1.  No free fluid collection is appreciated.  2. There is heterogeneous, hyperechoic echotexture of the liver overall similar.  This could be seen with steatosis, chronic hepatic changes.  3.  No echogenic obstructive calculus, biliary dilatation or hydronephrosis is appreciated.    Electronically signed by: Juan Humphrey MD  Date:    07/18/2022  Time:    10:59    US Abdomen Limited  Narrative: EXAMINATION:  US ABDOMEN LIMITED    CLINICAL HISTORY:  Nonalcoholic steatohepatitis (HERNANDEZ)    COMPARISON:  07/18/2022    FINDINGS:  Liver measures 18.2 cm with increased echogenicity.  Contour is normal.  No focal hepatic mass or intrahepatic ductal dilatation.  Normal directional flow is in the main portal vein.    Pancreas, abdominal aorta and IVC are normal.    Gallbladder is surgically absent.  Common bile duct measures 0.5 cm.    Right kidney measures 12.4 cm.  No stones or hydronephrosis.  Renal echogenicity is normal.  Impression: Hepatic enlargement with diffuse steatosis    Electronically signed by: Valente Robbins MD  Date:    07/18/2023  Time:    11:02    6/16/2021  Native liver (random biopsy):   - Macrovesicular steatosis 70%, microvesicular steatosis 15%, with   steatohepatitis (see CODI grading form below)   - Steatohepatitis score 7, stage 2   - Negative for Ivett-Denk bodies   - Iron: No deposit (Grade 0/4)   - Trichrome: Stage 2/4 fibrosis   - Iron and trichrome stains with appropriate controls   CODI grade score 7/8   Fatty liver 3 (>67%)   Inflammation 2 (2 to 4 foci)   Balloon cells 2 (many)       Assessment/Plan     43 y.o. White female with:    1. HERNANDEZ, biopsy proven, with stage 2 fibrosis [elevated LFTs]  - 12/2021: LFTs improved with 40 lb weight loss. Encouraged to keep going.   - not a candidate for clinical trials as she started ozempic, then switched to tradjenta with  significant weight loss  - recommend HCC screening annually   - vaccinated against HBV. Needs HAV, however with latex allergy. Will contact [facility] to ensure they have HAVRIX and can use non-latex covered needle. 2022 update: unable to source, did not complete.  - 08/10/2023 suggests F3 fibrosis >> will follow as such   - advanced fibrosis counseling provided today     2. Hepatomegaly, secondary to HERNANDEZ   - 24cm measurement on MRE 5/2021  - 19 cm measurement on ultrasound 7/2022, improved with weight loss.   - 18 cm measurement 7/2023, continues to improve    3. Metabolic syndrome   - with 40 lb weight loss since last visit  - on tradjenta  - f/w endocrine for thyroid disease  - 7/2022: a1c has improved as well   - needs recheck 2023, seeing endocrine 9/2023      Orders Placed This Encounter   Procedures    US Abdomen Limited    Protime-INR    Comprehensive Metabolic Panel    CBC Auto Differential    AFP Tumor Marker       Fibroscan today suggests F3, S3, thus we will follow her conservatively.  April has made significant improvements in her weight loss but despite this has elevated liver enzymes still and severe steatosis. At this juncture no clinical trials available but I encouraged her to continue working on improving blood sugars.   Consider Fibroscan or liver biopsy next year.  Consider switch to GLP-1  Counseled on no alcohol, raw oysters.  Follow up in about 6 months (around 2/10/2024). With ultrasound and labs prior, with Zelda Porras NP, in [place].    I spent 30 minutes on the day of this encounter preparing for, evaluating, treating, and managing this patient.     Thank you for allowing me to participate in the care of April SONIA Sen PA-C  Hepatology Advanced Practice Provider  [facility]

## 2023-08-28 ENCOUNTER — PATIENT MESSAGE (OUTPATIENT)
Dept: HEPATOLOGY | Facility: CLINIC | Age: 43
End: 2023-08-28
Payer: COMMERCIAL

## 2023-08-28 RX ORDER — ALBUTEROL SULFATE 90 UG/1
AEROSOL, METERED RESPIRATORY (INHALATION)
Qty: 18 G | Refills: 1 | Status: SHIPPED | OUTPATIENT
Start: 2023-08-28 | End: 2023-10-31 | Stop reason: SDUPTHER

## 2023-09-05 ENCOUNTER — OFFICE VISIT (OUTPATIENT)
Dept: GASTROENTEROLOGY | Facility: CLINIC | Age: 43
End: 2023-09-05
Payer: COMMERCIAL

## 2023-09-05 VITALS — WEIGHT: 245.38 LBS | HEIGHT: 68 IN | BODY MASS INDEX: 37.19 KG/M2

## 2023-09-05 DIAGNOSIS — K21.9 GASTROESOPHAGEAL REFLUX DISEASE, UNSPECIFIED WHETHER ESOPHAGITIS PRESENT: Primary | ICD-10-CM

## 2023-09-05 DIAGNOSIS — R13.19 ESOPHAGEAL DYSPHAGIA: ICD-10-CM

## 2023-09-05 PROCEDURE — 3061F NEG MICROALBUMINURIA REV: CPT | Mod: CPTII,S$GLB,, | Performed by: INTERNAL MEDICINE

## 2023-09-05 PROCEDURE — 3061F PR NEG MICROALBUMINURIA RESULT DOCUMENTED/REVIEW: ICD-10-PCS | Mod: CPTII,S$GLB,, | Performed by: INTERNAL MEDICINE

## 2023-09-05 PROCEDURE — 3066F PR DOCUMENTATION OF TREATMENT FOR NEPHROPATHY: ICD-10-PCS | Mod: CPTII,S$GLB,, | Performed by: INTERNAL MEDICINE

## 2023-09-05 PROCEDURE — 99213 PR OFFICE/OUTPT VISIT, EST, LEVL III, 20-29 MIN: ICD-10-PCS | Mod: S$GLB,,, | Performed by: INTERNAL MEDICINE

## 2023-09-05 PROCEDURE — 3008F BODY MASS INDEX DOCD: CPT | Mod: CPTII,S$GLB,, | Performed by: INTERNAL MEDICINE

## 2023-09-05 PROCEDURE — 1159F PR MEDICATION LIST DOCUMENTED IN MEDICAL RECORD: ICD-10-PCS | Mod: CPTII,S$GLB,, | Performed by: INTERNAL MEDICINE

## 2023-09-05 PROCEDURE — 99213 OFFICE O/P EST LOW 20 MIN: CPT | Mod: S$GLB,,, | Performed by: INTERNAL MEDICINE

## 2023-09-05 PROCEDURE — 4010F PR ACE/ARB THEARPY RXD/TAKEN: ICD-10-PCS | Mod: CPTII,S$GLB,, | Performed by: INTERNAL MEDICINE

## 2023-09-05 PROCEDURE — 3044F PR MOST RECENT HEMOGLOBIN A1C LEVEL <7.0%: ICD-10-PCS | Mod: CPTII,S$GLB,, | Performed by: INTERNAL MEDICINE

## 2023-09-05 PROCEDURE — 3044F HG A1C LEVEL LT 7.0%: CPT | Mod: CPTII,S$GLB,, | Performed by: INTERNAL MEDICINE

## 2023-09-05 PROCEDURE — 99999 PR PBB SHADOW E&M-EST. PATIENT-LVL IV: ICD-10-PCS | Mod: PBBFAC,,, | Performed by: INTERNAL MEDICINE

## 2023-09-05 PROCEDURE — 1160F RVW MEDS BY RX/DR IN RCRD: CPT | Mod: CPTII,S$GLB,, | Performed by: INTERNAL MEDICINE

## 2023-09-05 PROCEDURE — 99999 PR PBB SHADOW E&M-EST. PATIENT-LVL IV: CPT | Mod: PBBFAC,,, | Performed by: INTERNAL MEDICINE

## 2023-09-05 PROCEDURE — 4010F ACE/ARB THERAPY RXD/TAKEN: CPT | Mod: CPTII,S$GLB,, | Performed by: INTERNAL MEDICINE

## 2023-09-05 PROCEDURE — 1160F PR REVIEW ALL MEDS BY PRESCRIBER/CLIN PHARMACIST DOCUMENTED: ICD-10-PCS | Mod: CPTII,S$GLB,, | Performed by: INTERNAL MEDICINE

## 2023-09-05 PROCEDURE — 3066F NEPHROPATHY DOC TX: CPT | Mod: CPTII,S$GLB,, | Performed by: INTERNAL MEDICINE

## 2023-09-05 PROCEDURE — 1159F MED LIST DOCD IN RCRD: CPT | Mod: CPTII,S$GLB,, | Performed by: INTERNAL MEDICINE

## 2023-09-05 PROCEDURE — 3008F PR BODY MASS INDEX (BMI) DOCUMENTED: ICD-10-PCS | Mod: CPTII,S$GLB,, | Performed by: INTERNAL MEDICINE

## 2023-09-05 NOTE — PROGRESS NOTES
Subjective     Patient ID: April D Rajat is a 43 y.o. female.    Chief Complaint: No chief complaint on file.    Ms. Earl returns for routine follow-up, of her history of reflux.  She reports she is doing pretty well at present in that regard.  Occasionally, she has the feeling that food is going to get stuck on the way down, but it has not actually happened.  Also, if she goes a long time without eating, her chest was start hurting.  But as soon as she then eats, it starts feeling better.  She continues to take the Dexilant 60 mg every morning, and Pepcid 40 mg at night.  As for her constipation, she was changed to Linzess 145 mcg, and is now having a bowel movement just about every day.    She also reports that she has been able to lose a little weight.  She reports that her weight is down to 241 (although here today on our scale, she weighed 245.) (and this is from a max weight of 290, 2 years ago.)     Note:  She is on Tradjenta, but has been on that for about 5 years.        See recent Upper GI endoscopy 5/4/2022   Indications:           Pharyngeal phase dysphagia, Esophageal reflux, Nausea   Comorbidities        Hypertension, Morbid obesity (BMI 39), Hyperlipidemia, Asthma   Providers:             Earl Colunga MD  Impression:            - Normal oropharynx.                          - Normal cricopharyngeus.                          - Normal esophagus.                          - Z-line regular, 39 cm from the incisors.                          - Patulous lower esophageal sphincter.                          - No endoscopic esophageal abnormality to explain patient's dysphagia. Esophagus dilated, 54 Fr.                          - Normal cardia.                          - Slight antritis. Biopsied.                          - Normal stomach otherwise.                          - Normal pylorus.                          - Normal examined duodenum.                          - Normal major papilla.    Recommendation:        - Discharge patient to home.                          - Follow an antireflux regimen.                          - Exercise and weight loss.                          - Continue present medications.                          - Use Dexilant (dexlansoprazole) 60 mg PO daily.                          - Use Pepcid (famotidine) 40 mg PO nightly.                        .   Earl Colunga MD   5/4/2022     STOMACH, PREPYLORIC, BIOPSY:   -  Benign antral mucosa showing mild chronic inactive gastritis with focal   intestinal metaplasia (complete type), negative for dysplasia.   -  No Helicobacter organisms identified by immunostain.         Wt Readings from Last 25 Encounters:  09/05/23 : 111.3 kg (245 lb 6 oz)  08/10/23 : 110 kg (242 lb 8.1 oz)  06/16/23 : 109.3 kg (241 lb)  03/23/23 : 11 kg (24 lb 4 oz)  11/02/22 : 112.8 kg (248 lb 9.6 oz)  10/31/22 : 112.5 kg (248 lb 2 oz)  10/29/22 : 112.5 kg (248 lb 0.3 oz)  10/11/22 : 112.5 kg (248 lb)  09/29/22 : 114.9 kg (253 lb 4.9 oz)  09/25/22 : 113.9 kg (251 lb 1.7 oz)  09/12/22 : 113.9 kg (250 lb 15.9 oz)  06/21/22 : 119.3 kg (263 lb)  05/19/22 : 119.3 kg (263 lb 1.9 oz)  05/03/22 : 117.9 kg (260 lb)  03/02/22 : 117.9 kg (260 lb)  03/02/22 : 117.9 kg (260 lb)  10/05/21 : 121.6 kg (268 lb)  07/22/21 : 128.4 kg (283 lb 1.1 oz)  07/06/21 : 128 kg (282 lb 3 oz)  06/16/21 : 131.5 kg (290 lb)  06/08/21 : 129.3 kg (285 lb 0.9 oz)  06/06/21 : 129.3 kg (285 lb)  05/25/21 : 129.3 kg (285 lb)  05/13/21 : 130.9 kg (288 lb 9.6 oz)  10/21/20 : 127 kg (280 lb)      Review of Systems   Constitutional:  Negative for appetite change, fever and unexpected weight change.   HENT:  Positive for trouble swallowing (Rare episodes of dysphagia.). Negative for mouth sores and sore throat.    Eyes: Negative.    Respiratory: Negative.  Negative for cough and choking.    Cardiovascular: Negative.  Negative for chest pain and leg swelling.   Gastrointestinal:  Positive for reflux.  Negative for abdominal distention, abdominal pain, anal bleeding, blood in stool, constipation, diarrhea, nausea and vomiting.   Genitourinary: Negative.    Musculoskeletal: Negative.    Integumentary:  Negative for color change and rash. Negative.   Neurological: Negative.    Hematological:  Negative for adenopathy.   Psychiatric/Behavioral: Negative.            Objective     Physical Exam  Vitals and nursing note reviewed.   Constitutional:       General: She is not in acute distress.     Appearance: Normal appearance. She is well-developed. She is obese.   HENT:      Mouth/Throat:      Mouth: Mucous membranes are moist.      Pharynx: No oropharyngeal exudate.   Eyes:      General: No scleral icterus.  Neck:      Thyroid: No thyromegaly.      Trachea: No tracheal deviation.   Cardiovascular:      Rate and Rhythm: Normal rate and regular rhythm.      Heart sounds: Normal heart sounds.   Pulmonary:      Effort: Pulmonary effort is normal.      Breath sounds: Normal breath sounds.   Abdominal:      General: Bowel sounds are normal. There is no distension.      Palpations: Abdomen is soft. There is no mass.      Tenderness: There is no abdominal tenderness. There is no guarding.   Lymphadenopathy:      Cervical: No cervical adenopathy.   Skin:     General: Skin is warm and dry.      Findings: No rash.   Neurological:      General: No focal deficit present.      Mental Status: She is alert and oriented to person, place, and time.   Psychiatric:         Mood and Affect: Mood normal.         Behavior: Behavior normal.            Assessment and Plan     Gastroesophageal reflux disease, unspecified whether esophagitis present    Esophageal dysphagia    BMI 37.0-37.9, adult      Trial of Gaviscon 30 mL at bedtime.  To try this for a month or 2.  If symptoms are no better, we will proceed to EGD.  Otherwise, return to clinic in 5 or 6 months.

## 2023-09-12 ENCOUNTER — HOSPITAL ENCOUNTER (OUTPATIENT)
Dept: RADIOLOGY | Facility: HOSPITAL | Age: 43
Discharge: HOME | End: 2023-09-12
Attending: INTERNAL MEDICINE
Payer: COMMERCIAL

## 2023-09-12 DIAGNOSIS — E04.2 MULTINODULAR GOITER: ICD-10-CM

## 2023-09-12 PROCEDURE — 76536 US EXAM OF HEAD AND NECK: CPT | Mod: 26,,, | Performed by: RADIOLOGY

## 2023-09-12 PROCEDURE — 76536 US SOFT TISSUE HEAD NECK THYROID: ICD-10-PCS | Mod: 26,,, | Performed by: RADIOLOGY

## 2023-09-12 PROCEDURE — 76536 US EXAM OF HEAD AND NECK: CPT | Mod: TC,PO

## 2023-09-18 ENCOUNTER — OFFICE VISIT (OUTPATIENT)
Dept: ENDOCRINOLOGY | Facility: CLINIC | Age: 43
End: 2023-09-18
Payer: COMMERCIAL

## 2023-09-18 VITALS
HEIGHT: 68 IN | HEART RATE: 100 BPM | BODY MASS INDEX: 36.86 KG/M2 | WEIGHT: 243.19 LBS | DIASTOLIC BLOOD PRESSURE: 78 MMHG | SYSTOLIC BLOOD PRESSURE: 106 MMHG | OXYGEN SATURATION: 97 %

## 2023-09-18 DIAGNOSIS — R13.10 DYSPHAGIA, UNSPECIFIED TYPE: ICD-10-CM

## 2023-09-18 DIAGNOSIS — R94.6 ABNORMAL THYROID FUNCTION TEST: ICD-10-CM

## 2023-09-18 DIAGNOSIS — E04.2 MULTINODULAR GOITER: Primary | ICD-10-CM

## 2023-09-18 PROCEDURE — 4010F ACE/ARB THERAPY RXD/TAKEN: CPT | Mod: CPTII,S$GLB,, | Performed by: INTERNAL MEDICINE

## 2023-09-18 PROCEDURE — 3044F HG A1C LEVEL LT 7.0%: CPT | Mod: CPTII,S$GLB,, | Performed by: INTERNAL MEDICINE

## 2023-09-18 PROCEDURE — 3074F PR MOST RECENT SYSTOLIC BLOOD PRESSURE < 130 MM HG: ICD-10-PCS | Mod: CPTII,S$GLB,, | Performed by: INTERNAL MEDICINE

## 2023-09-18 PROCEDURE — 3061F PR NEG MICROALBUMINURIA RESULT DOCUMENTED/REVIEW: ICD-10-PCS | Mod: CPTII,S$GLB,, | Performed by: INTERNAL MEDICINE

## 2023-09-18 PROCEDURE — 3061F NEG MICROALBUMINURIA REV: CPT | Mod: CPTII,S$GLB,, | Performed by: INTERNAL MEDICINE

## 2023-09-18 PROCEDURE — 1159F MED LIST DOCD IN RCRD: CPT | Mod: CPTII,S$GLB,, | Performed by: INTERNAL MEDICINE

## 2023-09-18 PROCEDURE — 3044F PR MOST RECENT HEMOGLOBIN A1C LEVEL <7.0%: ICD-10-PCS | Mod: CPTII,S$GLB,, | Performed by: INTERNAL MEDICINE

## 2023-09-18 PROCEDURE — 1160F PR REVIEW ALL MEDS BY PRESCRIBER/CLIN PHARMACIST DOCUMENTED: ICD-10-PCS | Mod: CPTII,S$GLB,, | Performed by: INTERNAL MEDICINE

## 2023-09-18 PROCEDURE — 99999 PR PBB SHADOW E&M-EST. PATIENT-LVL V: ICD-10-PCS | Mod: PBBFAC,,, | Performed by: INTERNAL MEDICINE

## 2023-09-18 PROCEDURE — 2023F DILAT RTA XM W/O RTNOPTHY: CPT | Mod: CPTII,S$GLB,, | Performed by: INTERNAL MEDICINE

## 2023-09-18 PROCEDURE — 99999 PR PBB SHADOW E&M-EST. PATIENT-LVL V: CPT | Mod: PBBFAC,,, | Performed by: INTERNAL MEDICINE

## 2023-09-18 PROCEDURE — 3078F PR MOST RECENT DIASTOLIC BLOOD PRESSURE < 80 MM HG: ICD-10-PCS | Mod: CPTII,S$GLB,, | Performed by: INTERNAL MEDICINE

## 2023-09-18 PROCEDURE — 3074F SYST BP LT 130 MM HG: CPT | Mod: CPTII,S$GLB,, | Performed by: INTERNAL MEDICINE

## 2023-09-18 PROCEDURE — 2023F PR DILATED RETINAL EXAM W/O EVID OF RETINOPATHY: ICD-10-PCS | Mod: CPTII,S$GLB,, | Performed by: INTERNAL MEDICINE

## 2023-09-18 PROCEDURE — 1159F PR MEDICATION LIST DOCUMENTED IN MEDICAL RECORD: ICD-10-PCS | Mod: CPTII,S$GLB,, | Performed by: INTERNAL MEDICINE

## 2023-09-18 PROCEDURE — 4010F PR ACE/ARB THEARPY RXD/TAKEN: ICD-10-PCS | Mod: CPTII,S$GLB,, | Performed by: INTERNAL MEDICINE

## 2023-09-18 PROCEDURE — 1160F RVW MEDS BY RX/DR IN RCRD: CPT | Mod: CPTII,S$GLB,, | Performed by: INTERNAL MEDICINE

## 2023-09-18 PROCEDURE — 99214 OFFICE O/P EST MOD 30 MIN: CPT | Mod: S$GLB,,, | Performed by: INTERNAL MEDICINE

## 2023-09-18 PROCEDURE — 3066F NEPHROPATHY DOC TX: CPT | Mod: CPTII,S$GLB,, | Performed by: INTERNAL MEDICINE

## 2023-09-18 PROCEDURE — 3008F PR BODY MASS INDEX (BMI) DOCUMENTED: ICD-10-PCS | Mod: CPTII,S$GLB,, | Performed by: INTERNAL MEDICINE

## 2023-09-18 PROCEDURE — 3008F BODY MASS INDEX DOCD: CPT | Mod: CPTII,S$GLB,, | Performed by: INTERNAL MEDICINE

## 2023-09-18 PROCEDURE — 99214 PR OFFICE/OUTPT VISIT, EST, LEVL IV, 30-39 MIN: ICD-10-PCS | Mod: S$GLB,,, | Performed by: INTERNAL MEDICINE

## 2023-09-18 PROCEDURE — 3078F DIAST BP <80 MM HG: CPT | Mod: CPTII,S$GLB,, | Performed by: INTERNAL MEDICINE

## 2023-09-18 PROCEDURE — 3066F PR DOCUMENTATION OF TREATMENT FOR NEPHROPATHY: ICD-10-PCS | Mod: CPTII,S$GLB,, | Performed by: INTERNAL MEDICINE

## 2023-09-18 NOTE — PROGRESS NOTES
CHIEF COMPLAINT: Multinodular Goiter   43 y.o. old being seen as a f/u. Saw PA in [employer]. In 2019 was found to have thyroid nodule. No XRT to head/neck. No History of FNA. Seeing GI for dysphagia. She has had a dilation.  She does have GERD- gaviscon added. No palpitations. No tremors. No anxiety. Has some insomnia that she feels is worse than before. Has chronic constipation- stable.       PAST MEDICAL HISTORY/PAST SURGICAL HISTORY:  Reviewed in [facility]    SOCIAL HISTORY: Reviewed in [facility]    FAMILY HISTORY:  Mother and daughters with Hypothyroidism.     MEDICATIONS/ALLERGIES: The patient's MedCard has been updated and reviewed.              PE:    GENERAL: Well developed, well nourished.  NECK: Supple, trachea midline, thyroid irregular but no palpable nodules.   CHEST: Resp even and unlabored, CTA bilateral.  CARDIAC: RRR, S1, S2 heard, no murmurs, rubs, S3, or S4          LABS/Radiology     Latest Reference Range & Units 04/28/23 11:30 09/12/23 10:46   TSH 0.400 - 4.000 uIU/mL 0.505 0.295 (L)   Free T4 0.71 - 1.51 ng/dL  0.94   (L): Data is abnormally low      US SOFT TISSUE HEAD NECK THYROID     CLINICAL HISTORY:  Nontoxic multinodular goiter     TECHNIQUE:  Ultrasound of the thyroid and cervical lymph nodes was performed.     COMPARISON:  September 7, 2022     FINDINGS:  The right lobe of the thyroid measures 5.3 x 2.1 x 2.4 cm.  The left lobe of the thyroid measures 5.5 x 1.6 x 2.1 cm.  The overall thyroid volume is mildly increased at approximately 24 cc.  In the right lobe there is a well-defined isoechoic solid nodule measuring 10 x 7 x 10 mm.  This does not contain suspicious features.  Inferiorly there is a 9 x 9 x 11 mm hypoechoic nodule.  On the left there are 6 x 6 x 5 and 11 x 7 x 10 mm nodules.  These are similar to the previous exam.  None of these currently meets biopsy criteria.  The AP isthmus measurement is 3 mm.  Normal sized lymph nodes are seen measuring as large as 25 x 14 x 11 mm in the  right R2 region.     Impression:     Multinodular thyroid gland similar to the September 7, 2022 exam    ASSESSMENT/PLAN:  1. Multinodular Goiter- no XRT.  Ultrasound shows no significant change from before.  Discussed that unlikely that the dysphagia she is having is thyroid related as the nodules are not very large.  Independently reviewed ultrasound images and no concerning findings.  Can repeat    2. Abnormal TFTs-suppressed TSH.- relatively asymptomatic.  She does have some insomnia.  At this point would repeat TSH in 6 months in the past it has normalized his own.    3. Dysphagia-seeing Gastroenterology.  Has had an endoscopy with dilation.  Also has reflux.  Recently had Gaviscon added to regimen.    FOLLOWUP  6 months- TSH  F/U 1 yr with TSH and Thyroid Ultrasound.

## 2023-10-05 ENCOUNTER — PATIENT MESSAGE (OUTPATIENT)
Dept: ADMINISTRATIVE | Facility: OTHER | Age: 43
End: 2023-10-05
Payer: COMMERCIAL

## 2023-10-13 ENCOUNTER — PATIENT MESSAGE (OUTPATIENT)
Dept: ADMINISTRATIVE | Facility: OTHER | Age: 43
End: 2023-10-13
Payer: COMMERCIAL

## 2023-10-18 ENCOUNTER — PATIENT MESSAGE (OUTPATIENT)
Dept: ADMINISTRATIVE | Facility: OTHER | Age: 43
End: 2023-10-18
Payer: COMMERCIAL

## 2023-10-31 ENCOUNTER — OFFICE VISIT (OUTPATIENT)
Dept: FAMILY MEDICINE | Facility: CLINIC | Age: 43
End: 2023-10-31
Payer: COMMERCIAL

## 2023-10-31 VITALS
BODY MASS INDEX: 36.75 KG/M2 | SYSTOLIC BLOOD PRESSURE: 110 MMHG | DIASTOLIC BLOOD PRESSURE: 68 MMHG | RESPIRATION RATE: 20 BRPM | WEIGHT: 242.5 LBS | HEIGHT: 68 IN | OXYGEN SATURATION: 97 % | HEART RATE: 99 BPM | TEMPERATURE: 99 F

## 2023-10-31 DIAGNOSIS — Z00.00 ROUTINE PHYSICAL EXAMINATION: Primary | ICD-10-CM

## 2023-10-31 DIAGNOSIS — I10 ESSENTIAL HYPERTENSION: ICD-10-CM

## 2023-10-31 DIAGNOSIS — L98.8 SKIN PLAQUE: ICD-10-CM

## 2023-10-31 DIAGNOSIS — Z79.899 ENCOUNTER FOR LONG-TERM CURRENT USE OF MEDICATION: ICD-10-CM

## 2023-10-31 DIAGNOSIS — E11.9 TYPE 2 DIABETES MELLITUS WITHOUT COMPLICATION, WITHOUT LONG-TERM CURRENT USE OF INSULIN: ICD-10-CM

## 2023-10-31 DIAGNOSIS — J45.20 MILD INTERMITTENT ASTHMA WITHOUT COMPLICATION: ICD-10-CM

## 2023-10-31 DIAGNOSIS — Z23 IMMUNIZATION DUE: ICD-10-CM

## 2023-10-31 DIAGNOSIS — K64.9 BLEEDING HEMORRHOID: ICD-10-CM

## 2023-10-31 LAB
25(OH)D3+25(OH)D2 SERPL-MCNC: 27 NG/ML (ref 30–96)
ALBUMIN SERPL BCP-MCNC: 3.5 G/DL (ref 3.5–5.2)
ALP SERPL-CCNC: 133 U/L (ref 55–135)
ALT SERPL W/O P-5'-P-CCNC: 41 U/L (ref 10–44)
ANION GAP SERPL CALC-SCNC: 14 MMOL/L (ref 8–16)
AST SERPL-CCNC: 24 U/L (ref 10–40)
BASOPHILS # BLD AUTO: 0.04 K/UL (ref 0–0.2)
BASOPHILS NFR BLD: 0.4 % (ref 0–1.9)
BILIRUB SERPL-MCNC: 0.4 MG/DL (ref 0.1–1)
BUN SERPL-MCNC: 7 MG/DL (ref 6–20)
CALCIUM SERPL-MCNC: 9.6 MG/DL (ref 8.7–10.5)
CHLORIDE SERPL-SCNC: 104 MMOL/L (ref 95–110)
CO2 SERPL-SCNC: 22 MMOL/L (ref 23–29)
CREAT SERPL-MCNC: 0.7 MG/DL (ref 0.5–1.4)
DIFFERENTIAL METHOD: ABNORMAL
EOSINOPHIL # BLD AUTO: 0.3 K/UL (ref 0–0.5)
EOSINOPHIL NFR BLD: 2.7 % (ref 0–8)
ERYTHROCYTE [DISTWIDTH] IN BLOOD BY AUTOMATED COUNT: 12 % (ref 11.5–14.5)
EST. GFR  (NO RACE VARIABLE): >60 ML/MIN/1.73 M^2
GLUCOSE SERPL-MCNC: 171 MG/DL (ref 70–110)
HCT VFR BLD AUTO: 44.2 % (ref 37–48.5)
HGB BLD-MCNC: 14.1 G/DL (ref 12–16)
IMM GRANULOCYTES # BLD AUTO: 0.03 K/UL (ref 0–0.04)
IMM GRANULOCYTES NFR BLD AUTO: 0.3 % (ref 0–0.5)
LYMPHOCYTES # BLD AUTO: 2.2 K/UL (ref 1–4.8)
LYMPHOCYTES NFR BLD: 20.3 % (ref 18–48)
MCH RBC QN AUTO: 29.9 PG (ref 27–31)
MCHC RBC AUTO-ENTMCNC: 31.9 G/DL (ref 32–36)
MCV RBC AUTO: 94 FL (ref 82–98)
MONOCYTES # BLD AUTO: 0.5 K/UL (ref 0.3–1)
MONOCYTES NFR BLD: 4.4 % (ref 4–15)
NEUTROPHILS # BLD AUTO: 7.7 K/UL (ref 1.8–7.7)
NEUTROPHILS NFR BLD: 71.9 % (ref 38–73)
NRBC BLD-RTO: 0 /100 WBC
PLATELET # BLD AUTO: 299 K/UL (ref 150–450)
PMV BLD AUTO: 10.2 FL (ref 9.2–12.9)
POTASSIUM SERPL-SCNC: 3.7 MMOL/L (ref 3.5–5.1)
PROT SERPL-MCNC: 7.3 G/DL (ref 6–8.4)
RBC # BLD AUTO: 4.72 M/UL (ref 4–5.4)
SODIUM SERPL-SCNC: 140 MMOL/L (ref 136–145)
TSH SERPL DL<=0.005 MIU/L-ACNC: 0.45 UIU/ML (ref 0.4–4)
VIT B12 SERPL-MCNC: 637 PG/ML (ref 210–950)
WBC # BLD AUTO: 10.75 K/UL (ref 3.9–12.7)

## 2023-10-31 PROCEDURE — 3066F NEPHROPATHY DOC TX: CPT | Mod: CPTII,S$GLB,, | Performed by: NURSE PRACTITIONER

## 2023-10-31 PROCEDURE — 4010F ACE/ARB THERAPY RXD/TAKEN: CPT | Mod: CPTII,S$GLB,, | Performed by: NURSE PRACTITIONER

## 2023-10-31 PROCEDURE — 2023F DILAT RTA XM W/O RTNOPTHY: CPT | Mod: CPTII,S$GLB,, | Performed by: NURSE PRACTITIONER

## 2023-10-31 PROCEDURE — 80053 COMPREHEN METABOLIC PANEL: CPT | Performed by: NURSE PRACTITIONER

## 2023-10-31 PROCEDURE — 3078F PR MOST RECENT DIASTOLIC BLOOD PRESSURE < 80 MM HG: ICD-10-PCS | Mod: CPTII,S$GLB,, | Performed by: NURSE PRACTITIONER

## 2023-10-31 PROCEDURE — 84443 ASSAY THYROID STIM HORMONE: CPT | Performed by: NURSE PRACTITIONER

## 2023-10-31 PROCEDURE — 3044F PR MOST RECENT HEMOGLOBIN A1C LEVEL <7.0%: ICD-10-PCS | Mod: CPTII,S$GLB,, | Performed by: NURSE PRACTITIONER

## 2023-10-31 PROCEDURE — 82306 VITAMIN D 25 HYDROXY: CPT | Performed by: NURSE PRACTITIONER

## 2023-10-31 PROCEDURE — 3008F PR BODY MASS INDEX (BMI) DOCUMENTED: ICD-10-PCS | Mod: CPTII,S$GLB,, | Performed by: NURSE PRACTITIONER

## 2023-10-31 PROCEDURE — 90472 IMMUNIZATION ADMIN EACH ADD: CPT | Mod: S$GLB,,, | Performed by: NURSE PRACTITIONER

## 2023-10-31 PROCEDURE — 3061F PR NEG MICROALBUMINURIA RESULT DOCUMENTED/REVIEW: ICD-10-PCS | Mod: CPTII,S$GLB,, | Performed by: NURSE PRACTITIONER

## 2023-10-31 PROCEDURE — 82607 VITAMIN B-12: CPT | Performed by: NURSE PRACTITIONER

## 2023-10-31 PROCEDURE — 90472 PNEUMOCOCCAL CONJUGATE VACCINE 20-VALENT: ICD-10-PCS | Mod: S$GLB,,, | Performed by: NURSE PRACTITIONER

## 2023-10-31 PROCEDURE — 3008F BODY MASS INDEX DOCD: CPT | Mod: CPTII,S$GLB,, | Performed by: NURSE PRACTITIONER

## 2023-10-31 PROCEDURE — 85025 COMPLETE CBC W/AUTO DIFF WBC: CPT | Performed by: NURSE PRACTITIONER

## 2023-10-31 PROCEDURE — 4010F PR ACE/ARB THEARPY RXD/TAKEN: ICD-10-PCS | Mod: CPTII,S$GLB,, | Performed by: NURSE PRACTITIONER

## 2023-10-31 PROCEDURE — 1159F PR MEDICATION LIST DOCUMENTED IN MEDICAL RECORD: ICD-10-PCS | Mod: CPTII,S$GLB,, | Performed by: NURSE PRACTITIONER

## 2023-10-31 PROCEDURE — 2023F PR DILATED RETINAL EXAM W/O EVID OF RETINOPATHY: ICD-10-PCS | Mod: CPTII,S$GLB,, | Performed by: NURSE PRACTITIONER

## 2023-10-31 PROCEDURE — 1159F MED LIST DOCD IN RCRD: CPT | Mod: CPTII,S$GLB,, | Performed by: NURSE PRACTITIONER

## 2023-10-31 PROCEDURE — 3066F PR DOCUMENTATION OF TREATMENT FOR NEPHROPATHY: ICD-10-PCS | Mod: CPTII,S$GLB,, | Performed by: NURSE PRACTITIONER

## 2023-10-31 PROCEDURE — 1160F RVW MEDS BY RX/DR IN RCRD: CPT | Mod: CPTII,S$GLB,, | Performed by: NURSE PRACTITIONER

## 2023-10-31 PROCEDURE — 90471 FLU VACCINE (QUAD) GREATER THAN OR EQUAL TO 3YO PRESERVATIVE FREE IM: ICD-10-PCS | Mod: S$GLB,,, | Performed by: NURSE PRACTITIONER

## 2023-10-31 PROCEDURE — 90471 IMMUNIZATION ADMIN: CPT | Mod: S$GLB,,, | Performed by: NURSE PRACTITIONER

## 2023-10-31 PROCEDURE — 3074F SYST BP LT 130 MM HG: CPT | Mod: CPTII,S$GLB,, | Performed by: NURSE PRACTITIONER

## 2023-10-31 PROCEDURE — 3078F DIAST BP <80 MM HG: CPT | Mod: CPTII,S$GLB,, | Performed by: NURSE PRACTITIONER

## 2023-10-31 PROCEDURE — 3074F PR MOST RECENT SYSTOLIC BLOOD PRESSURE < 130 MM HG: ICD-10-PCS | Mod: CPTII,S$GLB,, | Performed by: NURSE PRACTITIONER

## 2023-10-31 PROCEDURE — 99396 PR PREVENTIVE VISIT,EST,40-64: ICD-10-PCS | Mod: 25,S$GLB,, | Performed by: NURSE PRACTITIONER

## 2023-10-31 PROCEDURE — 90686 FLU VACCINE (QUAD) GREATER THAN OR EQUAL TO 3YO PRESERVATIVE FREE IM: ICD-10-PCS | Mod: S$GLB,,, | Performed by: NURSE PRACTITIONER

## 2023-10-31 PROCEDURE — 90686 IIV4 VACC NO PRSV 0.5 ML IM: CPT | Mod: S$GLB,,, | Performed by: NURSE PRACTITIONER

## 2023-10-31 PROCEDURE — 90677 PCV20 VACCINE IM: CPT | Mod: S$GLB,,, | Performed by: NURSE PRACTITIONER

## 2023-10-31 PROCEDURE — 99396 PREV VISIT EST AGE 40-64: CPT | Mod: 25,S$GLB,, | Performed by: NURSE PRACTITIONER

## 2023-10-31 PROCEDURE — 3044F HG A1C LEVEL LT 7.0%: CPT | Mod: CPTII,S$GLB,, | Performed by: NURSE PRACTITIONER

## 2023-10-31 PROCEDURE — 90677 PNEUMOCOCCAL CONJUGATE VACCINE 20-VALENT: ICD-10-PCS | Mod: S$GLB,,, | Performed by: NURSE PRACTITIONER

## 2023-10-31 PROCEDURE — 3061F NEG MICROALBUMINURIA REV: CPT | Mod: CPTII,S$GLB,, | Performed by: NURSE PRACTITIONER

## 2023-10-31 PROCEDURE — 1160F PR REVIEW ALL MEDS BY PRESCRIBER/CLIN PHARMACIST DOCUMENTED: ICD-10-PCS | Mod: CPTII,S$GLB,, | Performed by: NURSE PRACTITIONER

## 2023-10-31 RX ORDER — MONTELUKAST SODIUM 10 MG/1
10 TABLET ORAL NIGHTLY
Qty: 90 TABLET | Refills: 3 | Status: SHIPPED | OUTPATIENT
Start: 2023-10-31

## 2023-10-31 RX ORDER — LEVALBUTEROL INHALATION SOLUTION 1.25 MG/3ML
1 SOLUTION RESPIRATORY (INHALATION) EVERY 4 HOURS PRN
Qty: 30 ML | Refills: 1 | Status: SHIPPED | OUTPATIENT
Start: 2023-10-31 | End: 2024-10-30

## 2023-10-31 RX ORDER — TRIAMCINOLONE ACETONIDE 1 MG/G
CREAM TOPICAL 2 TIMES DAILY
Qty: 45 G | Refills: 1 | Status: SHIPPED | OUTPATIENT
Start: 2023-10-31 | End: 2024-01-30

## 2023-10-31 RX ORDER — TIRZEPATIDE 2.5 MG/.5ML
2.5 INJECTION, SOLUTION SUBCUTANEOUS
Qty: 12 PEN | Refills: 3 | Status: SHIPPED | OUTPATIENT
Start: 2023-10-31 | End: 2024-01-30

## 2023-10-31 RX ORDER — EPINEPHRINE 0.3 MG/.3ML
1 INJECTION SUBCUTANEOUS
Qty: 2 EACH | Refills: 2 | Status: SHIPPED | OUTPATIENT
Start: 2023-10-31 | End: 2024-01-25

## 2023-10-31 RX ORDER — BUDESONIDE 0.5 MG/2ML
INHALANT ORAL
Qty: 60 ML | Refills: 1 | Status: SHIPPED | OUTPATIENT
Start: 2023-10-31

## 2023-10-31 RX ORDER — ALBUTEROL SULFATE 90 UG/1
AEROSOL, METERED RESPIRATORY (INHALATION)
Qty: 18 G | Refills: 1 | Status: SHIPPED | OUTPATIENT
Start: 2023-10-31 | End: 2023-12-18

## 2023-10-31 NOTE — PATIENT INSTRUCTIONS
Your provider has requested that you make an appointment with Dermatology, to  schedule an appointment, please contact one of the following providers:    Dr. Guy Vivar & MD Dr. Chrissy Ponce, PA  [address] [place] [address]  [id number]  Phone:  [phone]

## 2023-10-31 NOTE — PROGRESS NOTES
"Subjective:       Patient ID: April D Rajat is a 43 y.o. female.    Chief Complaint: Annual Exam  Pt here for annual exam/physical  Pt with DM-on Tradjenta-pt states pain management asked if she could try GLP-1 for weight loss. Tried ozempic, upset stomach.   htn-followed by cardiology, on toprol and lisinopril, ASA  Chronic pain-on oxycodone, gabapentin, Dr Edouard  Asthma-stable on singulair  GERD-on dexilant and pepcid    REUBEN-wearing CPAP  HPI  Review of Systems   Constitutional:  Negative for activity change and unexpected weight change.   HENT:  Negative for hearing loss, rhinorrhea and trouble swallowing.    Eyes:  Negative for discharge and visual disturbance.   Respiratory:  Negative for chest tightness and wheezing.    Cardiovascular:  Negative for chest pain and palpitations.   Gastrointestinal:  Positive for constipation. Negative for blood in stool, diarrhea and vomiting.   Endocrine: Negative for polydipsia and polyuria.   Genitourinary:  Negative for difficulty urinating, dysuria, hematuria and menstrual problem.   Musculoskeletal:  Positive for arthralgias and neck pain. Negative for joint swelling.   Neurological:  Positive for headaches. Negative for weakness.   Psychiatric/Behavioral:  Negative for confusion and dysphoric mood.        Past medical, surgical, family and social history reviewed.  Objective:     Vitals:    10/31/23 0903   BP: 110/68   Pulse: 99   Resp: 20   Temp: 98.7 °F (37.1 °C)   SpO2: 97%   Weight: 110 kg (242 lb 8.1 oz)   Height: 5' 8" (1.727 m)   PainSc: 0-No pain     Body mass index is 36.87 kg/m².     Physical Exam  Constitutional:       General: She is not in acute distress.     Appearance: She is well-developed. She is obese. She is not diaphoretic.   HENT:      Head: Normocephalic and atraumatic.      Right Ear: Hearing, tympanic membrane, ear canal and external ear normal.      Left Ear: Hearing, tympanic membrane, ear canal and external ear normal.      Nose: Nose normal. "      Mouth/Throat:      Pharynx: Uvula midline.   Eyes:      General:         Right eye: No discharge.         Left eye: No discharge.      Conjunctiva/sclera: Conjunctivae normal.      Pupils: Pupils are equal, round, and reactive to light.   Neck:      Thyroid: No thyromegaly.      Vascular: No carotid bruit or JVD.      Trachea: Trachea normal.   Cardiovascular:      Rate and Rhythm: Normal rate and regular rhythm.      Pulses:           Dorsalis pedis pulses are 2+ on the right side and 2+ on the left side.        Posterior tibial pulses are 2+ on the right side and 2+ on the left side.      Heart sounds: No murmur heard.     No friction rub. No gallop.   Pulmonary:      Effort: Pulmonary effort is normal. No respiratory distress.      Breath sounds: Normal breath sounds. No wheezing or rales.   Chest:      Chest wall: No tenderness.   Abdominal:      General: Bowel sounds are normal. There is no distension.      Palpations: Abdomen is soft. There is no mass.      Tenderness: There is no abdominal tenderness. There is no guarding or rebound.   Musculoskeletal:         General: Normal range of motion.      Cervical back: Normal range of motion and neck supple.   Feet:      Right foot:      Protective Sensation: 5 sites tested.  5 sites sensed.      Skin integrity: No ulcer, blister, skin breakdown, erythema, warmth, callus or dry skin.      Left foot:      Protective Sensation: 5 sites tested.  5 sites sensed.      Skin integrity: No ulcer, blister, skin breakdown, erythema, warmth, callus or dry skin.   Skin:     General: Skin is warm and dry.   Neurological:      Mental Status: She is alert and oriented to person, place, and time.      Coordination: Coordination normal.   Psychiatric:         Behavior: Behavior normal.         Thought Content: Thought content normal.         Judgment: Judgment normal.       Assessment:       1. Routine physical examination    2. Mild intermittent asthma without complication     3. Skin plaque    4. Immunization due    5. Encounter for long-term current use of medication    6. Bleeding hemorrhoid    7. Type 2 diabetes mellitus without complication, without long-term current use of insulin    8. Essential hypertension        Plan:       April was seen today for annual exam.    Diagnoses and all orders for this visit:    Routine physical examination    Mild intermittent asthma without complication  -     budesonide (PULMICORT) 0.5 mg/2 mL nebulizer solution; USE 2 ML(0.5 MG) VIA NEBULIZER TWICE DAILY AS NEEDED  -     montelukast (SINGULAIR) 10 mg tablet; Take 1 tablet (10 mg total) by mouth every evening.    Skin plaque  -     Ambulatory referral/consult to Dermatology; Future    Immunization due  -     (In Office Administered) Pneumococcal Conjugate Vaccine (20 Valent) (IM) (Preferred)    Encounter for long-term current use of medication  -     CBC Auto Differential  -     Comprehensive Metabolic Panel  -     TSH  -     Vitamin B12  -     Vitamin D    Bleeding hemorrhoid  -     Ambulatory referral/consult to General Surgery; Future    Type 2 diabetes mellitus without complication, without long-term current use of insulin  -     tirzepatide (MOUNJARO) 2.5 mg/0.5 mL PnIj; Inject 2.5 mg into the skin every 7 days.    Essential hypertension    Other orders  -     EPINEPHrine (EPIPEN) 0.3 mg/0.3 mL AtIn; Inject 0.3 mLs (0.3 mg total) into the muscle as needed.  -     albuterol (PROVENTIL/VENTOLIN HFA) 90 mcg/actuation inhaler; USE 2 PUFFS EVERY 6 HOURS AS NEEDED FOR WHEEZING  -     levalbuterol (XOPENEX) 1.25 mg/3 mL nebulizer solution; Take 3 mLs (1.25 mg total) by nebulization every 4 (four) hours as needed for Wheezing. Rescue  -     Influenza - Quadrivalent (PF)  -     triamcinolone acetonide 0.1% (KENALOG) 0.1 % cream; Apply topically 2 (two) times daily.

## 2023-10-31 NOTE — PROGRESS NOTES
Venipuncture performed with 21 gauge butterfly, x's 1 attempt,  to L Antecubital vein.  Specimens collected per orders.      Pressure dressing applied to site, instructed patient to remove dressing in 10-15 minutes, OK to re-adjust dressing if pressure causing any discomfort, to observe closely for numbness and/or discoloration to hand or fingers, and to notify provider if bleeding persists after applying constant pressure lasting 30 minutes.

## 2023-11-06 ENCOUNTER — PATIENT MESSAGE (OUTPATIENT)
Dept: FAMILY MEDICINE | Facility: CLINIC | Age: 43
End: 2023-11-06
Payer: COMMERCIAL

## 2023-11-16 ENCOUNTER — OFFICE VISIT (OUTPATIENT)
Dept: SURGERY | Facility: CLINIC | Age: 43
End: 2023-11-16
Payer: COMMERCIAL

## 2023-11-16 VITALS
SYSTOLIC BLOOD PRESSURE: 129 MMHG | BODY MASS INDEX: 36.45 KG/M2 | WEIGHT: 240.5 LBS | TEMPERATURE: 97 F | DIASTOLIC BLOOD PRESSURE: 79 MMHG | HEART RATE: 90 BPM | HEIGHT: 68 IN

## 2023-11-16 DIAGNOSIS — K64.9 BLEEDING HEMORRHOID: ICD-10-CM

## 2023-11-16 PROCEDURE — 46221 LIGATION OF HEMORRHOID(S): CPT | Mod: S$GLB,,, | Performed by: SURGERY

## 2023-11-16 PROCEDURE — 3066F NEPHROPATHY DOC TX: CPT | Mod: CPTII,S$GLB,, | Performed by: SURGERY

## 2023-11-16 PROCEDURE — 3078F DIAST BP <80 MM HG: CPT | Mod: CPTII,S$GLB,, | Performed by: SURGERY

## 2023-11-16 PROCEDURE — 3061F PR NEG MICROALBUMINURIA RESULT DOCUMENTED/REVIEW: ICD-10-PCS | Mod: CPTII,S$GLB,, | Performed by: SURGERY

## 2023-11-16 PROCEDURE — 3066F PR DOCUMENTATION OF TREATMENT FOR NEPHROPATHY: ICD-10-PCS | Mod: CPTII,S$GLB,, | Performed by: SURGERY

## 2023-11-16 PROCEDURE — 3074F PR MOST RECENT SYSTOLIC BLOOD PRESSURE < 130 MM HG: ICD-10-PCS | Mod: CPTII,S$GLB,, | Performed by: SURGERY

## 2023-11-16 PROCEDURE — 3008F PR BODY MASS INDEX (BMI) DOCUMENTED: ICD-10-PCS | Mod: CPTII,S$GLB,, | Performed by: SURGERY

## 2023-11-16 PROCEDURE — 99203 OFFICE O/P NEW LOW 30 MIN: CPT | Mod: 25,S$GLB,, | Performed by: SURGERY

## 2023-11-16 PROCEDURE — 3008F BODY MASS INDEX DOCD: CPT | Mod: CPTII,S$GLB,, | Performed by: SURGERY

## 2023-11-16 PROCEDURE — 3044F PR MOST RECENT HEMOGLOBIN A1C LEVEL <7.0%: ICD-10-PCS | Mod: CPTII,S$GLB,, | Performed by: SURGERY

## 2023-11-16 PROCEDURE — 4010F ACE/ARB THERAPY RXD/TAKEN: CPT | Mod: CPTII,S$GLB,, | Performed by: SURGERY

## 2023-11-16 PROCEDURE — 3044F HG A1C LEVEL LT 7.0%: CPT | Mod: CPTII,S$GLB,, | Performed by: SURGERY

## 2023-11-16 PROCEDURE — 1159F PR MEDICATION LIST DOCUMENTED IN MEDICAL RECORD: ICD-10-PCS | Mod: CPTII,S$GLB,, | Performed by: SURGERY

## 2023-11-16 PROCEDURE — 4010F PR ACE/ARB THEARPY RXD/TAKEN: ICD-10-PCS | Mod: CPTII,S$GLB,, | Performed by: SURGERY

## 2023-11-16 PROCEDURE — 99203 PR OFFICE/OUTPT VISIT, NEW, LEVL III, 30-44 MIN: ICD-10-PCS | Mod: 25,S$GLB,, | Performed by: SURGERY

## 2023-11-16 PROCEDURE — 46221 PR HEMORRHOIDECTOMY INTERNAL RUBBER BAND LIGATIONS: ICD-10-PCS | Mod: S$GLB,,, | Performed by: SURGERY

## 2023-11-16 PROCEDURE — 3078F PR MOST RECENT DIASTOLIC BLOOD PRESSURE < 80 MM HG: ICD-10-PCS | Mod: CPTII,S$GLB,, | Performed by: SURGERY

## 2023-11-16 PROCEDURE — 1159F MED LIST DOCD IN RCRD: CPT | Mod: CPTII,S$GLB,, | Performed by: SURGERY

## 2023-11-16 PROCEDURE — 3061F NEG MICROALBUMINURIA REV: CPT | Mod: CPTII,S$GLB,, | Performed by: SURGERY

## 2023-11-16 PROCEDURE — 3074F SYST BP LT 130 MM HG: CPT | Mod: CPTII,S$GLB,, | Performed by: SURGERY

## 2023-11-16 PROCEDURE — 99999 PR PBB SHADOW E&M-EST. PATIENT-LVL V: ICD-10-PCS | Mod: PBBFAC,,, | Performed by: SURGERY

## 2023-11-16 PROCEDURE — 99999 PR PBB SHADOW E&M-EST. PATIENT-LVL V: CPT | Mod: PBBFAC,,, | Performed by: SURGERY

## 2023-11-16 RX ORDER — LINAGLIPTIN 5 MG/1
5 TABLET, FILM COATED ORAL DAILY
COMMUNITY
Start: 2023-11-08 | End: 2023-11-16

## 2023-11-16 NOTE — PROGRESS NOTES
Subjective     Patient ID: Miri Earl is a 43 y.o. female.    Chief Complaint: Consult (Bleeding hemorrhiods)    HPI   pleasant 43-year-old female referred to me for evaluation of bleeding hemorrhoids.  Patient notes that she is been having blood per rectum with increasing frequency.  States the bleeding occurs with nearly all bowel movements.  Has not had any spontaneous bleeding.  We will note the sensation of tissue prolapsing as well.  No fevers or chills.  She had a colonoscopy 2020 with mild internal hemorrhoids noted.  Her past medical history significant for diabetes, hypertension fibromyalgia and HERNANDEZ.  She is had no significant abdominal surgical history.      Review of Systems   Constitutional:  Negative for activity change and appetite change.   Respiratory:  Negative for apnea and shortness of breath.    Cardiovascular:  Negative for chest pain.   Gastrointestinal:  Negative for abdominal distention, vomiting and reflux.   Musculoskeletal:  Negative for arthralgias.   Hematological:  Negative for adenopathy.   Psychiatric/Behavioral:  Negative for agitation and decreased concentration.           Objective     Physical Exam  Vitals reviewed.   Cardiovascular:      Rate and Rhythm: Normal rate.      Pulses: Normal pulses.   Pulmonary:      Effort: Pulmonary effort is normal.   Abdominal:      General: Abdomen is flat. There is no distension.      Tenderness: There is no abdominal tenderness.      Hernia: No hernia is present.   Genitourinary:     Comments: External exam does demonstrate moderate external hemorrhoidal tags in the right anterior right posterior and left lateral positions.  Digital rectal exam with normal sphincter tone slight to moderate rectocele noted.  Anoscopy is performed demonstrating moderate to large internal hemorrhoids.  These do appear fairly friable.  Musculoskeletal:      Cervical back: Normal range of motion.   Skin:     General: Skin is warm.      Coloration: Skin is not  jaundiced.   Neurological:      General: No focal deficit present.      Mental Status: She is alert.   Psychiatric:         Mood and Affect: Mood normal.            Assessment and Plan     1. Bleeding hemorrhoid  -     Ambulatory referral/consult to General Surgery        Discussion with patient.  She does have large hemorrhoids appear prone to bleeding.  These are very friable.  I have recommended and offered banding in addition to fiber.  She desires to proceed.    Having received informed consent pt was placed in prone jackknife position. I then placed rubber bands on the L lateral column, R Ant and R post columns without event. Bleeding was minimal and pt tolerated the procedure well.           No follow-ups on file.

## 2023-12-18 RX ORDER — ALBUTEROL SULFATE 90 UG/1
AEROSOL, METERED RESPIRATORY (INHALATION)
Qty: 8.5 G | Refills: 1 | Status: SHIPPED | OUTPATIENT
Start: 2023-12-18

## 2024-01-12 DIAGNOSIS — K59.00 SIMPLE CONSTIPATION: ICD-10-CM

## 2024-01-12 RX ORDER — LINACLOTIDE 145 UG/1
145 CAPSULE, GELATIN COATED ORAL
Qty: 30 CAPSULE | Refills: 5 | Status: SHIPPED | OUTPATIENT
Start: 2024-01-12

## 2024-01-25 RX ORDER — EPINEPHRINE 0.3 MG/.3ML
INJECTION SUBCUTANEOUS
Qty: 2 EACH | Refills: 2 | Status: SHIPPED | OUTPATIENT
Start: 2024-01-25

## 2024-01-30 ENCOUNTER — OFFICE VISIT (OUTPATIENT)
Dept: FAMILY MEDICINE | Facility: CLINIC | Age: 44
End: 2024-01-30
Payer: COMMERCIAL

## 2024-01-30 VITALS
RESPIRATION RATE: 20 BRPM | HEART RATE: 104 BPM | SYSTOLIC BLOOD PRESSURE: 108 MMHG | WEIGHT: 233.69 LBS | HEIGHT: 68 IN | OXYGEN SATURATION: 98 % | BODY MASS INDEX: 35.42 KG/M2 | TEMPERATURE: 99 F | DIASTOLIC BLOOD PRESSURE: 86 MMHG

## 2024-01-30 DIAGNOSIS — E11.9 TYPE 2 DIABETES MELLITUS WITHOUT COMPLICATION, WITHOUT LONG-TERM CURRENT USE OF INSULIN: ICD-10-CM

## 2024-01-30 DIAGNOSIS — J32.9 SINUSITIS, UNSPECIFIED CHRONICITY, UNSPECIFIED LOCATION: Primary | ICD-10-CM

## 2024-01-30 PROCEDURE — 36415 COLL VENOUS BLD VENIPUNCTURE: CPT | Mod: S$GLB,,, | Performed by: NURSE PRACTITIONER

## 2024-01-30 PROCEDURE — 4010F ACE/ARB THERAPY RXD/TAKEN: CPT | Mod: CPTII,S$GLB,, | Performed by: NURSE PRACTITIONER

## 2024-01-30 PROCEDURE — 1159F MED LIST DOCD IN RCRD: CPT | Mod: CPTII,S$GLB,, | Performed by: NURSE PRACTITIONER

## 2024-01-30 PROCEDURE — 3079F DIAST BP 80-89 MM HG: CPT | Mod: CPTII,S$GLB,, | Performed by: NURSE PRACTITIONER

## 2024-01-30 PROCEDURE — 83036 HEMOGLOBIN GLYCOSYLATED A1C: CPT | Performed by: NURSE PRACTITIONER

## 2024-01-30 PROCEDURE — 3008F BODY MASS INDEX DOCD: CPT | Mod: CPTII,S$GLB,, | Performed by: NURSE PRACTITIONER

## 2024-01-30 PROCEDURE — 3074F SYST BP LT 130 MM HG: CPT | Mod: CPTII,S$GLB,, | Performed by: NURSE PRACTITIONER

## 2024-01-30 PROCEDURE — 99214 OFFICE O/P EST MOD 30 MIN: CPT | Mod: S$GLB,,, | Performed by: NURSE PRACTITIONER

## 2024-01-30 RX ORDER — CLOBETASOL PROPIONATE 0.5 MG/G
OINTMENT TOPICAL
COMMUNITY
Start: 2024-01-13

## 2024-01-30 RX ORDER — TIRZEPATIDE 5 MG/.5ML
5 INJECTION, SOLUTION SUBCUTANEOUS
Qty: 12 PEN | Refills: 3 | Status: SHIPPED | OUTPATIENT
Start: 2024-01-30 | End: 2024-04-30

## 2024-01-30 RX ORDER — IPRATROPIUM BROMIDE 42 UG/1
SPRAY, METERED NASAL
COMMUNITY
Start: 2024-01-23 | End: 2024-04-30

## 2024-01-30 RX ORDER — ACETAMINOPHEN 500 MG
TABLET ORAL
COMMUNITY
Start: 2023-11-14

## 2024-01-30 RX ORDER — AZITHROMYCIN 250 MG/1
TABLET, FILM COATED ORAL
Qty: 6 TABLET | Refills: 0 | Status: SHIPPED | OUTPATIENT
Start: 2024-01-30 | End: 2024-02-04

## 2024-01-30 NOTE — PROGRESS NOTES
"Subjective:       Patient ID: April D Rajat is a 43 y.o. female.    Chief Complaint: Follow-up  The patient presents today for a follow-up diabetic visit.  She is currently on Mounjaro 2.5 mg weekly.  Her weight is down 7 lb.  She is due for hemoglobin A1c.  She does feel like the medication has helped her eat less.  She is also having sinus issues.  See URI ROS  URI   Associated symptoms include congestion, coughing, headaches, rhinorrhea, sneezing and a sore throat. Pertinent negatives include no abdominal pain, chest pain, diarrhea, dysuria, nausea or vomiting.     Review of Systems   Constitutional:  Positive for appetite change and fatigue.   HENT:  Positive for congestion, postnasal drip, rhinorrhea, sinus pressure, sneezing and sore throat.    Eyes:  Negative for pain and redness.   Respiratory:  Positive for cough. Negative for choking, chest tightness and shortness of breath.    Cardiovascular:  Negative for chest pain and palpitations.   Gastrointestinal:  Negative for abdominal distention, abdominal pain, constipation, diarrhea, nausea and vomiting.   Endocrine: Negative for polydipsia and polyphagia.   Genitourinary:  Negative for dysuria and hematuria.   Musculoskeletal:  Negative for arthralgias, joint swelling and myalgias.   Skin:  Negative for color change and pallor.   Neurological:  Positive for headaches. Negative for dizziness and light-headedness.       Past medical, surgical, family and social history reviewed.  Objective:     Vitals:    01/30/24 0948   BP: 108/86   Pulse: 104   Resp: 20   Temp: 99 °F (37.2 °C)   TempSrc: Oral   SpO2: 98%   Weight: 106 kg (233 lb 11 oz)   Height: 5' 8" (1.727 m)   PainSc:   5   PainLoc: Back     Body mass index is 35.53 kg/m².     Physical Exam  Constitutional:       Appearance: She is well-developed.   HENT:      Head: Normocephalic and atraumatic.      Right Ear: Hearing, tympanic membrane, ear canal and external ear normal.      Left Ear: Hearing, tympanic " membrane, ear canal and external ear normal.      Nose: Mucosal edema and rhinorrhea present. No laceration.      Right Sinus: Maxillary sinus tenderness and frontal sinus tenderness present.      Left Sinus: Maxillary sinus tenderness and frontal sinus tenderness present.      Mouth/Throat:      Pharynx: Uvula midline. Posterior oropharyngeal erythema present.   Eyes:      General: No scleral icterus.        Right eye: No discharge.         Left eye: No discharge.      Conjunctiva/sclera: Conjunctivae normal.   Neck:      Trachea: No tracheal deviation.   Cardiovascular:      Rate and Rhythm: Normal rate and regular rhythm.      Heart sounds: No murmur heard.  Pulmonary:      Effort: Pulmonary effort is normal. No respiratory distress.      Breath sounds: Normal breath sounds. No stridor. No wheezing or rales.   Chest:      Chest wall: No tenderness.   Musculoskeletal:         General: Normal range of motion.      Cervical back: Normal range of motion and neck supple.   Lymphadenopathy:      Cervical: Cervical adenopathy present.   Skin:     General: Skin is warm and dry.   Neurological:      Mental Status: She is alert and oriented to person, place, and time.   Psychiatric:         Behavior: Behavior normal.         Thought Content: Thought content normal.         Judgment: Judgment normal.         Assessment:       1. Sinusitis, unspecified chronicity, unspecified location    2. Type 2 diabetes mellitus without complication, without long-term current use of insulin        Plan:       April was seen today for follow-up.    Diagnoses and all orders for this visit:    Sinusitis, unspecified chronicity, unspecified location-no QT prolongation.  The patient does have a history of SVT found on Holter monitor.  The patient was prescribed flecainide but tells me she did not want to take this medication because she read about it and was scared of the side effects.  She is currently taking metoprolol 50 mg daily.  I did  encourage her to reach out to the cardiologist regarding a substitute for the flecainide or more information from the cardiologist about it.  -     azithromycin (Z-ELI) 250 MG tablet; Take 2 tablets by mouth on day 1; Take 1 tablet by mouth on days 2-5    Type 2 diabetes mellitus without complication, without long-term current use of insulin  -     Hemoglobin A1C; Future  -     Hemoglobin A1C    Other orders  -     tirzepatide (MOUNJARO) 5 mg/0.5 mL PnIj; Inject 5 mg into the skin every 7 days.    I spent 30 minutes on this encounter, time includes face-to-face, chart review, documentation, test review and orders.

## 2024-01-31 ENCOUNTER — TELEPHONE (OUTPATIENT)
Dept: FAMILY MEDICINE | Facility: CLINIC | Age: 44
End: 2024-01-31
Payer: COMMERCIAL

## 2024-01-31 LAB
ESTIMATED AVG GLUCOSE: 117 MG/DL (ref 68–131)
HBA1C MFR BLD: 5.7 % (ref 4–5.6)

## 2024-01-31 NOTE — TELEPHONE ENCOUNTER
PA needed for Mounjaro 5mg  Initiated via [email or web address]  Key: [id number]    Awaiting response

## 2024-02-29 ENCOUNTER — OFFICE VISIT (OUTPATIENT)
Dept: GASTROENTEROLOGY | Facility: CLINIC | Age: 44
End: 2024-02-29
Payer: COMMERCIAL

## 2024-02-29 VITALS — WEIGHT: 235 LBS | BODY MASS INDEX: 35.61 KG/M2 | HEIGHT: 68 IN

## 2024-02-29 DIAGNOSIS — K21.9 GASTROESOPHAGEAL REFLUX DISEASE, UNSPECIFIED WHETHER ESOPHAGITIS PRESENT: ICD-10-CM

## 2024-02-29 DIAGNOSIS — E66.09 CLASS 2 OBESITY DUE TO EXCESS CALORIES WITH BODY MASS INDEX (BMI) OF 35.0 TO 35.9 IN ADULT, UNSPECIFIED WHETHER SERIOUS COMORBIDITY PRESENT: ICD-10-CM

## 2024-02-29 DIAGNOSIS — R13.10 DYSPHAGIA, UNSPECIFIED TYPE: Primary | ICD-10-CM

## 2024-02-29 PROCEDURE — 3044F HG A1C LEVEL LT 7.0%: CPT | Mod: CPTII,S$GLB,, | Performed by: INTERNAL MEDICINE

## 2024-02-29 PROCEDURE — 3008F BODY MASS INDEX DOCD: CPT | Mod: CPTII,S$GLB,, | Performed by: INTERNAL MEDICINE

## 2024-02-29 PROCEDURE — 4010F ACE/ARB THERAPY RXD/TAKEN: CPT | Mod: CPTII,S$GLB,, | Performed by: INTERNAL MEDICINE

## 2024-02-29 PROCEDURE — 99213 OFFICE O/P EST LOW 20 MIN: CPT | Mod: S$GLB,,, | Performed by: INTERNAL MEDICINE

## 2024-02-29 PROCEDURE — 99999 PR PBB SHADOW E&M-EST. PATIENT-LVL IV: CPT | Mod: PBBFAC,,, | Performed by: INTERNAL MEDICINE

## 2024-02-29 NOTE — PROGRESS NOTES
Subjective     Patient ID: April D Rajat is a 43 y.o. female.    Chief Complaint: Gastroesophageal Reflux    Ms. Earl is in for routine follow-up, of her reflux.  See her most recent GI office visit note below.  However, she reports she is having dysphagia again.  She finds it is worst with ground meat, bread, and rice.  But 1 positive report is that Gaviscon worked on the chest pain she was having.      See last GI OV 9/25/2023:     Ms. Earl returns for routine follow-up, of her history of reflux.  She reports she is doing pretty well at present in that regard.  Occasionally, she has the feeling that food is going to get stuck on the way down, but it has not actually happened.  Also, if she goes a long time without eating, her chest was start hurting.  But as soon as she then eats, it starts feeling better.  She continues to take the Dexilant 60 mg every morning, and Pepcid 40 mg at night.  As for her constipation, she was changed to Linzess 145 mcg, and is now having a bowel movement just about every day.     She also reports that she has been able to lose a little weight.  She reports that her weight is down to 241 (although here today on our scale, she weighed 245.) (and this is from a max weight of 290, 2 years ago.)     Note:  She is on Tradjenta, but has been on that for about 5 years.           See last Upper GI endoscopy 5/4/2022   Indications:           Pharyngeal phase dysphagia, Esophageal reflux, Nausea   Comorbidities       Hypertension, Morbid obesity (BMI 39), Hyperlipidemia, Asthma   Providers:             Earl Colunga MD  Impression:            - Normal oropharynx.                          - Normal cricopharyngeus.                          - Normal esophagus.                          - Z-line regular, 39 cm from the incisors.                          - Patulous lower esophageal sphincter.                          - No endoscopic esophageal abnormality to explain patient's dysphagia.  Esophagus dilated, 54 Fr.                          - Normal cardia.                          - Slight antritis. Biopsied.                          - Normal stomach otherwise.                          - Normal pylorus.                          - Normal examined duodenum.                          - Normal major papilla.   Recommendation:        - Discharge patient to home.                          - Follow an antireflux regimen.                          - Exercise and weight loss.                          - Continue present medications.                          - Use Dexilant (dexlansoprazole) 60 mg PO daily.                          - Use Pepcid (famotidine) 40 mg PO nightly.                        .   Earl Colunga MD   5/4/2022      STOMACH, PREPYLORIC, BIOPSY:   -  Benign antral mucosa showing mild chronic inactive gastritis with focal   intestinal metaplasia (complete type), negative for dysplasia.   -  No Helicobacter organisms identified by immunostain.                 Review of Systems   Constitutional:  Negative for appetite change, fever and unexpected weight change.   HENT:  Positive for trouble swallowing (Especially:  Ground meat, bread, rice.). Negative for mouth sores and sore throat.    Eyes: Negative.    Respiratory: Negative.  Negative for cough and choking.    Cardiovascular: Negative.  Negative for chest pain and leg swelling.   Gastrointestinal:  Positive for reflux. Negative for abdominal distention, abdominal pain, anal bleeding, blood in stool, constipation, diarrhea, nausea and vomiting.   Genitourinary: Negative.    Musculoskeletal: Negative.    Integumentary:  Negative for color change and rash. Negative.   Neurological: Negative.    Hematological:  Negative for adenopathy.   Psychiatric/Behavioral: Negative.            Objective     Physical Exam  Vitals and nursing note reviewed.   Constitutional:       General: She is not in acute distress.     Appearance: Normal appearance. She  is well-developed. She is obese.   HENT:      Mouth/Throat:      Mouth: Mucous membranes are moist.      Pharynx: No oropharyngeal exudate.   Eyes:      General: No scleral icterus.  Neck:      Thyroid: No thyromegaly.      Trachea: No tracheal deviation.   Cardiovascular:      Rate and Rhythm: Normal rate and regular rhythm.      Heart sounds: Normal heart sounds.   Pulmonary:      Effort: Pulmonary effort is normal.      Breath sounds: Normal breath sounds.   Abdominal:      General: Bowel sounds are normal. There is no distension.      Palpations: Abdomen is soft. There is no mass.      Tenderness: There is no abdominal tenderness. There is no guarding.   Lymphadenopathy:      Cervical: No cervical adenopathy.   Skin:     General: Skin is warm and dry.      Findings: No rash.   Neurological:      General: No focal deficit present.      Mental Status: She is alert and oriented to person, place, and time.   Psychiatric:         Mood and Affect: Mood normal.         Behavior: Behavior normal.            Assessment and Plan     Dysphagia, unspecified type    Gastroesophageal reflux disease, unspecified whether esophagitis present    Class 2 obesity due to excess calories with body mass index (BMI) of 35.0 to 35.9 in adult, unspecified whether serious comorbidity present      Continue the meds same for now.  Schedule for EGD with dilation.  Further recs to follow.

## 2024-03-04 ENCOUNTER — OFFICE VISIT (OUTPATIENT)
Dept: UROLOGY | Facility: CLINIC | Age: 44
End: 2024-03-04
Payer: COMMERCIAL

## 2024-03-04 VITALS — BODY MASS INDEX: 35.61 KG/M2 | WEIGHT: 235 LBS | HEIGHT: 68 IN

## 2024-03-04 DIAGNOSIS — N39.41 URGE INCONTINENCE: ICD-10-CM

## 2024-03-04 DIAGNOSIS — R33.9 URINARY RETENTION: ICD-10-CM

## 2024-03-04 DIAGNOSIS — R35.0 URINARY FREQUENCY: ICD-10-CM

## 2024-03-04 DIAGNOSIS — N32.81 OAB (OVERACTIVE BLADDER): Primary | ICD-10-CM

## 2024-03-04 DIAGNOSIS — R39.15 URINARY URGENCY: ICD-10-CM

## 2024-03-04 LAB
BILIRUBIN, UA POC OHS: NEGATIVE
BLOOD, UA POC OHS: NEGATIVE
CLARITY, UA POC OHS: CLEAR
COLOR, UA POC OHS: YELLOW
GLUCOSE, UA POC OHS: NEGATIVE
KETONES, UA POC OHS: NEGATIVE
LEUKOCYTES, UA POC OHS: NEGATIVE
NITRITE, UA POC OHS: NEGATIVE
PH, UA POC OHS: 6.5
POC RESIDUAL URINE VOLUME: 0 ML (ref 0–100)
PROTEIN, UA POC OHS: 30
SPECIFIC GRAVITY, UA POC OHS: 1.01
UROBILINOGEN, UA POC OHS: 0.2

## 2024-03-04 PROCEDURE — 81003 URINALYSIS AUTO W/O SCOPE: CPT | Mod: QW,S$GLB,,

## 2024-03-04 PROCEDURE — 51798 US URINE CAPACITY MEASURE: CPT | Mod: S$GLB,,,

## 2024-03-04 PROCEDURE — 3008F BODY MASS INDEX DOCD: CPT | Mod: CPTII,S$GLB,,

## 2024-03-04 PROCEDURE — 3044F HG A1C LEVEL LT 7.0%: CPT | Mod: CPTII,S$GLB,,

## 2024-03-04 PROCEDURE — 1159F MED LIST DOCD IN RCRD: CPT | Mod: CPTII,S$GLB,,

## 2024-03-04 PROCEDURE — 99214 OFFICE O/P EST MOD 30 MIN: CPT | Mod: S$GLB,,,

## 2024-03-04 PROCEDURE — 99999 PR PBB SHADOW E&M-EST. PATIENT-LVL V: CPT | Mod: PBBFAC,,,

## 2024-03-04 PROCEDURE — 1160F RVW MEDS BY RX/DR IN RCRD: CPT | Mod: CPTII,S$GLB,,

## 2024-03-04 PROCEDURE — 4010F ACE/ARB THERAPY RXD/TAKEN: CPT | Mod: CPTII,S$GLB,,

## 2024-03-04 RX ORDER — VIBEGRON 75 MG/1
75 TABLET, FILM COATED ORAL DAILY
Qty: 30 TABLET | Refills: 11 | Status: SHIPPED | OUTPATIENT
Start: 2024-03-04 | End: 2024-04-30

## 2024-03-04 RX ORDER — PREDNISONE 50 MG/1
TABLET ORAL
COMMUNITY
Start: 2024-02-26 | End: 2024-04-30

## 2024-03-04 NOTE — PROGRESS NOTES
[facility] Note  Staff: ELADIO Andrews    PCP: LESLIE Trammell    Chief Complaint: Urinary Urgency    Subjective:        HPI: Miri Earl is a 43 y.o. female new patient to me presents today for evaluation of urinary urgency. She has been seen in the past for the same by Dr. Berry. She underwent cystoscopy which was negative. She was diagnosed with OAB but has not trialed a medication. She states at times she feels the need to urinate multiple times per hour. She states she will leak urine on the way to the bathroom. She denies dysuria and hematuria. She does have a history of kidney stones. She also suffers with constipation and takes linzess daily. She is also on BP medications and has known elevated liver enzymes. Will trial Gemtesa for urinary symptoms.    Questions asked the pt during ov today:  Urgency: Yes, urge incontinence? Yes  Dysuria: No  Gross Hematuria:No  Straining:No    History of Kidney Stones?:  Yes    Constipation issues?:  Yes, takes linzess daily    PVR by bladder scan performed by MA today:  0 mL    REVIEW OF SYSTEMS:  Review of Systems   Constitutional: Negative.  Negative for chills and fever.   HENT: Negative.     Eyes: Negative.    Respiratory: Negative.     Cardiovascular: Negative.    Gastrointestinal:  Positive for constipation. Negative for abdominal pain, diarrhea, nausea and vomiting.   Genitourinary:  Positive for frequency and urgency. Negative for dysuria, flank pain and hematuria.   Musculoskeletal: Negative.  Negative for back pain.   Skin: Negative.    Neurological: Negative.    Endo/Heme/Allergies: Negative.    Psychiatric/Behavioral: Negative.         PMHx:  Past Medical History:   Diagnosis Date    Anxiety     Arthritis     Asthma     Breast cyst     Chronic back pain     Colon polyp     Depression     Diabetes mellitus     Elevated liver enzymes     Fibrocystic breast     Fibromyalgia     GERD (gastroesophageal reflux disease)     History of  Chiari malformation     History of colitis     History of gastritis     Hypertension     Hypokalemia     IBS (irritable bowel syndrome)     Insomnia     MVP (mitral valve prolapse)     HERNANDEZ (nonalcoholic steatohepatitis) 2021    Neuropathy     Postmenopausal HRT (hormone replacement therapy)     Seasonal allergies     Sleep apnea with use of continuous positive airway pressure (CPAP)     Spondylitis     Thyroid nodule        PSHx:  Past Surgical History:   Procedure Laterality Date    APPENDECTOMY  10/15/2009  [place]    BACK SURGERY      BACK SURGERY      xs 4    BACK SURGERY       SECTION      x 3    CHOLECYSTECTOMY      COLONOSCOPY    [place]    COLONOSCOPY  08/15/2014    Dr. Colunga: 1 colon polyp removed, hemorrhoids, Mild colonic spasm consistent with irritable bowel syndrome; repeat in 5 years for surveillance; biopsy: TUBULAR ADENOMA.    COLONOSCOPY N/A 10/21/2020    Procedure: COLONOSCOPY;  Surgeon: Earl Colunga Jr., MD; 2 colon polyps removed, hemorrhoids, Mild colonic spasm consistent with irritable bowel syndrome. repeat in 5 years for surveillance; biopsy: polyps- TUBULAR ADENOMA & hyperplastic polyp; random TI & colon biopsies WNL    COLONOSCOPY W/ POLYPECTOMY  10/31/2008  [facility]    2 mm polyp in the sigmoid colon, TUBULAR ADENOMATOUS POLYP.  Erythematous mucosa rectum, proctitis (prep  proctitis?). Irritable bowel syndrome.     ESOPHAGOGASTRODUODENOSCOPY  08/15/2014    Dr. Colunga: Reflux esophagitis, No endoscopic esophageal abnormality to explain patient's dysphagia. Esophagus dilated, 54FR, history/examination was suspicious for an esophageal spasm; gastritis; biopsy: stomach- unremarkable, bishop test negative    ESOPHAGOGASTRODUODENOSCOPY N/A 10/21/2020    Surgeon: Earl Colunga Jr., MD;  Location: [facility]; Reflux esophagitis; Gastritis. Enlarged gastric folds; No endoscopic esophageal abnormality to explain patient's dysphagia. Esophagus dilated, 51FR; biopsy: duodenum WNL;  stomach- REACTIVE GASTRITIS, negative H pylori    ESOPHAGOGASTRODUODENOSCOPY N/A 05/04/2022    Procedure: EGD (ESOPHAGOGASTRODUODENOSCOPY);  Surgeon: Earl Colunga Jr., MD;  Location: [facility];  Service: Endoscopy;  Laterality: N/A; Slight antritis, esophageal dilation performed; biopsy: stomach- mild chronic inactive gastritis with focal intestinal metaplasia (complete type), negative for dysplasia; negative for h pylori    GANGLION CYST EXCISION Right     HYSTERECTOMY  10/2007  [place]    LIVER BIOPSY  10/20/2008    Mild portal mixed leukocytic infiltrate,neutrophils and lymphocytes.  Bile ducts seen, negative for leukocytic infiltration.  Negative for steatohepatitis. Mild sinusoidal dilatation and congestion. Negative for granulomata nor malignancy.  Reticulum stain negative for hepaticfibrosis.  Negative iron stain.  PAS stain, negative for PAS-positive inclusions.    LUMBAR DISCECTOMY      LUMBAR FUSION      MYELOGRAPHY N/A 01/07/2019    Procedure: Myelogram lumbar and thoracic;  Surgeon: Hari Rich MD;  Location: [facility];  Service: Radiology;  Laterality: N/A;    NECK SURGERY      neurostimulator removal       OOPHORECTOMY      pain pump      PELVIC LAPAROSCOPY      SELECTIVE INJECTION OF ANESTHETIC AGENT AROUND LUMBAR SPINAL NERVE ROOT BY TRANSFORAMINAL APPROACH Bilateral 01/30/2019    Procedure: BLOCK, SPINAL NERVE ROOT, LUMBAR, SELECTIVE, TRANSFORAMINAL APPROACH; L3;  Surgeon: Tez Edouard MD;  Location: [facility];  Service: Pain Management;  Laterality: Bilateral;    SPINAL CORD STIMULATOR IMPLANT      UPPER GASTROINTESTINAL ENDOSCOPY  10/31/2008  [facility]    Normal esophagus. Lax LES, non-erosive esophageal reflux (NERD?) Erythematous gastropathy on greater curve.  PRABHU Test performed on 10/31/08 was Negative.        Fam Hx:   malignancies: No    kidney stones: No     Soc Hx:  , lives in [place]    Allergies:  Doxycycline; Iodinated contrast media; Latex, natural rubber; Shellfish  containing products; Morphine; and Povidone-iodine    Medications: reviewed     Objective:   There were no vitals filed for this visit.    Physical Exam  Constitutional:       Appearance: Normal appearance.   HENT:      Head: Normocephalic.      Mouth/Throat:      Mouth: Mucous membranes are moist.   Eyes:      Conjunctiva/sclera: Conjunctivae normal.   Pulmonary:      Effort: Pulmonary effort is normal.   Abdominal:      General: There is no distension.      Palpations: Abdomen is soft.      Tenderness: There is no abdominal tenderness.   Musculoskeletal:         General: Normal range of motion.      Cervical back: Normal range of motion.   Skin:     General: Skin is warm.   Neurological:      Mental Status: She is alert and oriented to person, place, and time.   Psychiatric:         Mood and Affect: Mood normal.         Behavior: Behavior normal.         LABS REVIEW:  UA today:  Color:Clear, Yellow  Spec. Grav.  1.010  PH  6.5  Negative for leukocytes, nitrates, glucose, ketones, urobili, bili, and blood.  Positive protein    Assessment:       1. OAB (overactive bladder)    2. Urinary urgency    3. Urinary frequency    4. Urge incontinence    5. Urinary retention          Plan:     Gemtesa 75mg prescribed to pt today as trial to see if med improves pt's current LUTS.  Benefits, risks and side affects were thoroughly explained to pt today in office with all questions answered.  Discussed conservative measures to control urgency and frequency including avoiding bladder irritants, timed voiding, not postponing voiding, and bowel regimen (as distended bowel has extrinsic compressive effect on bladder. Discussed bladder irritants include coffe (even decaf), tea, alcohol, soda, spicy foods, acidic juices (orange, tomato), vinegar, and artificial sweeteners.    F/u 3 months with PVR    [facility]: Active    ELADIO Andrews

## 2024-03-07 DIAGNOSIS — K21.9 GASTROESOPHAGEAL REFLUX DISEASE, UNSPECIFIED WHETHER ESOPHAGITIS PRESENT: Primary | ICD-10-CM

## 2024-03-07 RX ORDER — FAMOTIDINE 40 MG/1
40 TABLET, FILM COATED ORAL NIGHTLY
Qty: 90 TABLET | Refills: 3 | Status: SHIPPED | OUTPATIENT
Start: 2024-03-07

## 2024-03-18 ENCOUNTER — LAB VISIT (OUTPATIENT)
Dept: LAB | Facility: HOSPITAL | Age: 44
End: 2024-03-18
Attending: INTERNAL MEDICINE
Payer: COMMERCIAL

## 2024-03-18 DIAGNOSIS — E04.2 MULTINODULAR GOITER: ICD-10-CM

## 2024-03-18 DIAGNOSIS — R94.6 ABNORMAL THYROID FUNCTION TEST: ICD-10-CM

## 2024-03-18 LAB
T4 FREE SERPL-MCNC: 1.01 NG/DL (ref 0.71–1.51)
TSH SERPL DL<=0.005 MIU/L-ACNC: 0.38 UIU/ML (ref 0.4–4)

## 2024-03-18 PROCEDURE — 36415 COLL VENOUS BLD VENIPUNCTURE: CPT | Mod: PO | Performed by: INTERNAL MEDICINE

## 2024-03-18 PROCEDURE — 84443 ASSAY THYROID STIM HORMONE: CPT | Performed by: INTERNAL MEDICINE

## 2024-03-18 PROCEDURE — 84439 ASSAY OF FREE THYROXINE: CPT | Performed by: INTERNAL MEDICINE

## 2024-03-20 ENCOUNTER — PATIENT MESSAGE (OUTPATIENT)
Dept: ENDOCRINOLOGY | Facility: CLINIC | Age: 44
End: 2024-03-20
Payer: COMMERCIAL

## 2024-03-20 ENCOUNTER — TELEPHONE (OUTPATIENT)
Dept: ENDOCRINOLOGY | Facility: CLINIC | Age: 44
End: 2024-03-20
Payer: COMMERCIAL

## 2024-03-20 DIAGNOSIS — R12 HEARTBURN: ICD-10-CM

## 2024-03-21 RX ORDER — DEXLANSOPRAZOLE 60 MG/1
60 CAPSULE, DELAYED RELEASE ORAL
Qty: 90 CAPSULE | Refills: 3 | Status: SHIPPED | OUTPATIENT
Start: 2024-03-21 | End: 2025-03-21

## 2024-03-25 ENCOUNTER — PATIENT MESSAGE (OUTPATIENT)
Dept: ENDOCRINOLOGY | Facility: CLINIC | Age: 44
End: 2024-03-25
Payer: COMMERCIAL

## 2024-03-25 ENCOUNTER — PATIENT MESSAGE (OUTPATIENT)
Dept: UROLOGY | Facility: CLINIC | Age: 44
End: 2024-03-25
Payer: COMMERCIAL

## 2024-03-25 DIAGNOSIS — E05.90 SUBCLINICAL HYPERTHYROIDISM: Primary | ICD-10-CM

## 2024-03-27 ENCOUNTER — OFFICE (OUTPATIENT)
Dept: URBAN - METROPOLITAN AREA CLINIC 11 | Facility: CLINIC | Age: 44
End: 2024-03-27

## 2024-03-27 VITALS
DIASTOLIC BLOOD PRESSURE: 65 MMHG | HEART RATE: 92 BPM | WEIGHT: 201 LBS | OXYGEN SATURATION: 99 % | SYSTOLIC BLOOD PRESSURE: 103 MMHG | HEIGHT: 66 IN

## 2024-03-27 DIAGNOSIS — R13.10 DYSPHAGIA, UNSPECIFIED: ICD-10-CM

## 2024-03-27 DIAGNOSIS — D50.9 IRON DEFICIENCY ANEMIA, UNSPECIFIED: ICD-10-CM

## 2024-03-27 DIAGNOSIS — R14.0 ABDOMINAL DISTENSION (GASEOUS): ICD-10-CM

## 2024-03-27 DIAGNOSIS — K59.00 CONSTIPATION, UNSPECIFIED: ICD-10-CM

## 2024-03-27 PROCEDURE — 99204 OFFICE O/P NEW MOD 45 MIN: CPT | Performed by: NURSE PRACTITIONER

## 2024-03-27 RX ORDER — SODIUM PICOSULFATE, MAGNESIUM OXIDE, AND ANHYDROUS CITRIC ACID 10; 3.5; 12 MG/160ML; G/160ML; G/160ML
LIQUID ORAL
Qty: 350 | Refills: 0 | Status: COMPLETED
Start: 2024-03-27 | End: 2024-05-07

## 2024-03-28 ENCOUNTER — LAB VISIT (OUTPATIENT)
Dept: LAB | Facility: HOSPITAL | Age: 44
End: 2024-03-28
Attending: PHYSICIAN ASSISTANT
Payer: COMMERCIAL

## 2024-03-28 DIAGNOSIS — E05.90 SUBCLINICAL HYPERTHYROIDISM: ICD-10-CM

## 2024-03-28 PROCEDURE — 84445 ASSAY OF TSI GLOBULIN: CPT | Performed by: PHYSICIAN ASSISTANT

## 2024-03-28 PROCEDURE — 83520 IMMUNOASSAY QUANT NOS NONAB: CPT | Performed by: PHYSICIAN ASSISTANT

## 2024-03-30 LAB — TSH RECEP AB SER-ACNC: <1.1 IU/L (ref 0–1.75)

## 2024-04-01 LAB — TSI SER-ACNC: <0.1 IU/L

## 2024-04-04 ENCOUNTER — INPATIENT HOSPITAL (OUTPATIENT)
Dept: URBAN - METROPOLITAN AREA HOSPITAL 95 | Facility: HOSPITAL | Age: 44
End: 2024-04-04

## 2024-04-04 DIAGNOSIS — D64.9 ANEMIA, UNSPECIFIED: ICD-10-CM

## 2024-04-04 DIAGNOSIS — R10.84 GENERALIZED ABDOMINAL PAIN: ICD-10-CM

## 2024-04-04 DIAGNOSIS — R11.2 NAUSEA WITH VOMITING, UNSPECIFIED: ICD-10-CM

## 2024-04-04 DIAGNOSIS — R93.3 ABNORMAL FINDINGS ON DIAGNOSTIC IMAGING OF OTHER PARTS OF DI: ICD-10-CM

## 2024-04-04 PROCEDURE — 99222 1ST HOSP IP/OBS MODERATE 55: CPT | Mod: SA | Performed by: NURSE PRACTITIONER

## 2024-04-05 ENCOUNTER — INPATIENT HOSPITAL (OUTPATIENT)
Dept: URBAN - METROPOLITAN AREA HOSPITAL 95 | Facility: HOSPITAL | Age: 44
End: 2024-04-05

## 2024-04-05 DIAGNOSIS — R11.2 NAUSEA WITH VOMITING, UNSPECIFIED: ICD-10-CM

## 2024-04-05 DIAGNOSIS — K31.89 OTHER DISEASES OF STOMACH AND DUODENUM: ICD-10-CM

## 2024-04-05 PROCEDURE — 43239 EGD BIOPSY SINGLE/MULTIPLE: CPT | Performed by: INTERNAL MEDICINE

## 2024-04-06 ENCOUNTER — INPATIENT HOSPITAL (OUTPATIENT)
Dept: URBAN - METROPOLITAN AREA HOSPITAL 95 | Facility: HOSPITAL | Age: 44
End: 2024-04-06

## 2024-04-06 DIAGNOSIS — R93.3 ABNORMAL FINDINGS ON DIAGNOSTIC IMAGING OF OTHER PARTS OF DI: ICD-10-CM

## 2024-04-06 DIAGNOSIS — D50.9 IRON DEFICIENCY ANEMIA, UNSPECIFIED: ICD-10-CM

## 2024-04-06 DIAGNOSIS — J30.9 CHRONIC ALLERGIC RHINITIS: ICD-10-CM

## 2024-04-06 DIAGNOSIS — K31.9 DISEASE OF STOMACH AND DUODENUM, UNSPECIFIED: ICD-10-CM

## 2024-04-06 PROCEDURE — 99232 SBSQ HOSP IP/OBS MODERATE 35: CPT | Performed by: INTERNAL MEDICINE

## 2024-04-08 RX ORDER — CETIRIZINE HYDROCHLORIDE 10 MG/1
10 TABLET ORAL
Qty: 90 TABLET | Refills: 3 | Status: SHIPPED | OUTPATIENT
Start: 2024-04-08

## 2024-04-30 ENCOUNTER — OFFICE VISIT (OUTPATIENT)
Dept: FAMILY MEDICINE | Facility: CLINIC | Age: 44
End: 2024-04-30
Payer: COMMERCIAL

## 2024-04-30 VITALS
DIASTOLIC BLOOD PRESSURE: 74 MMHG | TEMPERATURE: 98 F | HEIGHT: 68 IN | HEART RATE: 98 BPM | WEIGHT: 221 LBS | BODY MASS INDEX: 33.49 KG/M2 | SYSTOLIC BLOOD PRESSURE: 102 MMHG | RESPIRATION RATE: 20 BRPM | OXYGEN SATURATION: 96 %

## 2024-04-30 DIAGNOSIS — E11.9 TYPE 2 DIABETES MELLITUS WITHOUT COMPLICATION, WITHOUT LONG-TERM CURRENT USE OF INSULIN: Primary | ICD-10-CM

## 2024-04-30 DIAGNOSIS — D51.9 ANEMIA DUE TO VITAMIN B12 DEFICIENCY, UNSPECIFIED B12 DEFICIENCY TYPE: ICD-10-CM

## 2024-04-30 DIAGNOSIS — Z12.39 ENCOUNTER FOR SCREENING FOR MALIGNANT NEOPLASM OF BREAST, UNSPECIFIED SCREENING MODALITY: ICD-10-CM

## 2024-04-30 LAB
ALBUMIN/CREAT UR: 175 UG/MG (ref 0–30)
CREAT UR-MCNC: 240 MG/DL (ref 15–325)
MICROALBUMIN UR DL<=1MG/L-MCNC: 420 UG/ML

## 2024-04-30 PROCEDURE — 1159F MED LIST DOCD IN RCRD: CPT | Mod: CPTII,S$GLB,, | Performed by: NURSE PRACTITIONER

## 2024-04-30 PROCEDURE — 3078F DIAST BP <80 MM HG: CPT | Mod: CPTII,S$GLB,, | Performed by: NURSE PRACTITIONER

## 2024-04-30 PROCEDURE — 99214 OFFICE O/P EST MOD 30 MIN: CPT | Mod: S$GLB,,, | Performed by: NURSE PRACTITIONER

## 2024-04-30 PROCEDURE — 3066F NEPHROPATHY DOC TX: CPT | Mod: CPTII,S$GLB,, | Performed by: NURSE PRACTITIONER

## 2024-04-30 PROCEDURE — 3060F POS MICROALBUMINURIA REV: CPT | Mod: CPTII,S$GLB,, | Performed by: NURSE PRACTITIONER

## 2024-04-30 PROCEDURE — 82043 UR ALBUMIN QUANTITATIVE: CPT | Performed by: NURSE PRACTITIONER

## 2024-04-30 PROCEDURE — 3008F BODY MASS INDEX DOCD: CPT | Mod: CPTII,S$GLB,, | Performed by: NURSE PRACTITIONER

## 2024-04-30 PROCEDURE — 1160F RVW MEDS BY RX/DR IN RCRD: CPT | Mod: CPTII,S$GLB,, | Performed by: NURSE PRACTITIONER

## 2024-04-30 PROCEDURE — 3044F HG A1C LEVEL LT 7.0%: CPT | Mod: CPTII,S$GLB,, | Performed by: NURSE PRACTITIONER

## 2024-04-30 PROCEDURE — 3074F SYST BP LT 130 MM HG: CPT | Mod: CPTII,S$GLB,, | Performed by: NURSE PRACTITIONER

## 2024-04-30 PROCEDURE — 4010F ACE/ARB THERAPY RXD/TAKEN: CPT | Mod: CPTII,S$GLB,, | Performed by: NURSE PRACTITIONER

## 2024-04-30 RX ORDER — CYANOCOBALAMIN 1000 UG/ML
1000 INJECTION, SOLUTION INTRAMUSCULAR; SUBCUTANEOUS
Qty: 10 ML | Refills: 1 | Status: SHIPPED | OUTPATIENT
Start: 2024-04-30

## 2024-04-30 RX ORDER — DULOXETINE HYDROCHLORIDE 30 MG/1
CAPSULE, DELAYED RELEASE ORAL
COMMUNITY
Start: 2024-04-09

## 2024-04-30 NOTE — PROGRESS NOTES
"Subjective:       Patient ID: April D Rajat is a 43 y.o. female.    Chief Complaint: Follow-up (3 month )  Pt is here for a 3 month f/u   Pt with DM  Lab Results   Component Value Date    HGBA1C 5.7 (H) 01/30/2024       HPI  Review of Systems   Constitutional:  Negative for appetite change, chills and fever.   HENT:  Negative for ear pain and postnasal drip.    Eyes:  Negative for pain and itching.   Respiratory:  Negative for chest tightness and shortness of breath.    Gastrointestinal:  Negative for abdominal distention and abdominal pain.   Endocrine: Negative for polydipsia and polyuria.   Genitourinary:  Negative for difficulty urinating and flank pain.   Skin:  Negative for color change and pallor.   Neurological:  Negative for light-headedness and headaches.   Hematological:  Negative for adenopathy. Does not bruise/bleed easily.   Psychiatric/Behavioral:  Negative for agitation.        Past medical, surgical, family and social history reviewed.  Objective:     Vitals:    04/30/24 1316   BP: 102/74   Pulse: 98   Resp: 20   Temp: 97.7 °F (36.5 °C)   SpO2: 96%   Weight: 100.3 kg (221 lb 0.2 oz)   Height: 5' 8" (1.727 m)   PainSc:   4   PainLoc: Back     Body mass index is 33.6 kg/m².     Physical Exam  Constitutional:       Appearance: She is well-developed. She is obese.   HENT:      Head: Normocephalic and atraumatic.      Right Ear: External ear normal.      Left Ear: External ear normal.      Nose: Nose normal.   Eyes:      General: No scleral icterus.        Right eye: No discharge.         Left eye: No discharge.      Conjunctiva/sclera: Conjunctivae normal.   Neck:      Trachea: No tracheal deviation.   Cardiovascular:      Rate and Rhythm: Normal rate and regular rhythm.      Heart sounds: Normal heart sounds. No murmur heard.     No friction rub.   Pulmonary:      Effort: Pulmonary effort is normal. No respiratory distress.      Breath sounds: Normal breath sounds. No stridor. No wheezing or rales. "   Chest:      Chest wall: No tenderness.   Musculoskeletal:         General: Normal range of motion.      Cervical back: Normal range of motion and neck supple.   Lymphadenopathy:      Cervical: No cervical adenopathy.   Skin:     General: Skin is warm and dry.   Neurological:      Mental Status: She is alert and oriented to person, place, and time.         Assessment:       1. Type 2 diabetes mellitus without complication, without long-term current use of insulin    2. Encounter for screening for malignant neoplasm of breast, unspecified screening modality    3. Anemia due to vitamin B12 deficiency, unspecified B12 deficiency type        Plan:       April was seen today for follow-up.    Diagnoses and all orders for this visit:    Type 2 diabetes mellitus without complication, without long-term current use of insulin  Increase mounjaro to 7.5 mg for one month, then increase to 10 mg weekly pt has lost 14 lbs in last 3 months   -     Hemoglobin A1C; Future  -     Microalbumin/Creatinine Ratio, Urine  -     Lipid Panel; Future    Encounter for screening for malignant neoplasm of breast, unspecified screening modality  -     Mammo Digital Screening Bilat w/ Inder; Future    Anemia due to vitamin B12 deficiency, unspecified B12 deficiency type  -     cyanocobalamin 1,000 mcg/mL injection; Inject 1 mL (1,000 mcg total) into the muscle every 30 days.    Other orders    I spent 30 minutes on this encounter, time includes face-to-face, chart review, documentation, test review and orders.

## 2024-05-06 ENCOUNTER — HOSPITAL ENCOUNTER (OUTPATIENT)
Dept: RADIOLOGY | Facility: HOSPITAL | Age: 44
Discharge: HOME | End: 2024-05-06
Attending: PHYSICIAN ASSISTANT
Payer: COMMERCIAL

## 2024-05-06 DIAGNOSIS — K75.81 NASH (NONALCOHOLIC STEATOHEPATITIS): ICD-10-CM

## 2024-05-06 DIAGNOSIS — K74.00 LIVER FIBROSIS: ICD-10-CM

## 2024-05-06 PROCEDURE — 76705 ECHO EXAM OF ABDOMEN: CPT | Mod: 26,,, | Performed by: RADIOLOGY

## 2024-05-06 PROCEDURE — 76705 ECHO EXAM OF ABDOMEN: CPT | Mod: TC,PO

## 2024-05-07 ENCOUNTER — AMBULATORY SURGICAL CENTER (OUTPATIENT)
Dept: URBAN - METROPOLITAN AREA SURGERY 2 | Facility: SURGERY | Age: 44
End: 2024-05-07
Payer: COMMERCIAL

## 2024-05-07 VITALS
DIASTOLIC BLOOD PRESSURE: 74 MMHG | SYSTOLIC BLOOD PRESSURE: 133 MMHG | RESPIRATION RATE: 20 BRPM | DIASTOLIC BLOOD PRESSURE: 80 MMHG | RESPIRATION RATE: 17 BRPM | TEMPERATURE: 97.8 F | HEART RATE: 71 BPM | DIASTOLIC BLOOD PRESSURE: 74 MMHG | HEART RATE: 81 BPM | RESPIRATION RATE: 17 BRPM | HEART RATE: 75 BPM | OXYGEN SATURATION: 99 % | DIASTOLIC BLOOD PRESSURE: 90 MMHG | HEART RATE: 82 BPM | OXYGEN SATURATION: 98 % | SYSTOLIC BLOOD PRESSURE: 121 MMHG | RESPIRATION RATE: 20 BRPM | DIASTOLIC BLOOD PRESSURE: 80 MMHG | HEART RATE: 81 BPM | SYSTOLIC BLOOD PRESSURE: 122 MMHG | SYSTOLIC BLOOD PRESSURE: 122 MMHG | HEART RATE: 75 BPM | HEART RATE: 81 BPM | DIASTOLIC BLOOD PRESSURE: 72 MMHG | HEART RATE: 76 BPM | TEMPERATURE: 97.8 F | SYSTOLIC BLOOD PRESSURE: 118 MMHG | DIASTOLIC BLOOD PRESSURE: 72 MMHG | RESPIRATION RATE: 16 BRPM | SYSTOLIC BLOOD PRESSURE: 122 MMHG | DIASTOLIC BLOOD PRESSURE: 90 MMHG | SYSTOLIC BLOOD PRESSURE: 113 MMHG | OXYGEN SATURATION: 99 % | TEMPERATURE: 97.8 F | SYSTOLIC BLOOD PRESSURE: 121 MMHG | SYSTOLIC BLOOD PRESSURE: 133 MMHG | HEART RATE: 82 BPM | HEART RATE: 71 BPM | HEART RATE: 76 BPM | SYSTOLIC BLOOD PRESSURE: 133 MMHG | HEIGHT: 66 IN | HEART RATE: 75 BPM | HEART RATE: 76 BPM | OXYGEN SATURATION: 98 % | HEART RATE: 82 BPM | DIASTOLIC BLOOD PRESSURE: 79 MMHG | WEIGHT: 200 LBS | SYSTOLIC BLOOD PRESSURE: 118 MMHG | RESPIRATION RATE: 16 BRPM | SYSTOLIC BLOOD PRESSURE: 121 MMHG | DIASTOLIC BLOOD PRESSURE: 74 MMHG | WEIGHT: 200 LBS | OXYGEN SATURATION: 99 % | TEMPERATURE: 97.7 F | DIASTOLIC BLOOD PRESSURE: 90 MMHG | HEIGHT: 66 IN | SYSTOLIC BLOOD PRESSURE: 113 MMHG | TEMPERATURE: 97.7 F | DIASTOLIC BLOOD PRESSURE: 79 MMHG | DIASTOLIC BLOOD PRESSURE: 80 MMHG | DIASTOLIC BLOOD PRESSURE: 72 MMHG | SYSTOLIC BLOOD PRESSURE: 113 MMHG | WEIGHT: 200 LBS | RESPIRATION RATE: 20 BRPM | SYSTOLIC BLOOD PRESSURE: 118 MMHG | DIASTOLIC BLOOD PRESSURE: 79 MMHG | RESPIRATION RATE: 16 BRPM | TEMPERATURE: 97.7 F | OXYGEN SATURATION: 98 % | HEIGHT: 66 IN | HEART RATE: 71 BPM | RESPIRATION RATE: 17 BRPM

## 2024-05-07 DIAGNOSIS — D50.9 IRON DEFICIENCY ANEMIA, UNSPECIFIED: ICD-10-CM

## 2024-05-07 DIAGNOSIS — K64.0 FIRST DEGREE HEMORRHOIDS: ICD-10-CM

## 2024-05-07 PROBLEM — D53.9 UNEXPLAINED IRON DEFICIENCY ANEMIA: Status: ACTIVE | Noted: 2024-05-07

## 2024-05-07 PROCEDURE — 45378 DIAGNOSTIC COLONOSCOPY: CPT | Performed by: INTERNAL MEDICINE

## 2024-05-07 NOTE — SERVICEHPINOTES
Denice Bergman   is a   43   year old  female   here today for colonoscopy due to iron deficiency anemia. Recent EGD with gastric cancer. UGI rxs resolving with remving fill from lapband

## 2024-05-07 NOTE — SERVICENOTES
Alerts:
 DB procedure - CO2 insufflation used for Colonoscopy

Intra-Procedure Room:
 Procedure Room 1;
Anesthesia Equipment Checked

PACU Handoff:
Patient's name, arrival to PACU time, allergies and safety concerns identified
The procedure performed and proceduralist were communicated
Pertinent health and medication history was communicated. All airway management concerns and other information sharing was provided.
The type of anesthesia, EKG data, vital signs and monitoring trends were communicated
Confirmation with the PACU nurse all questions or concerns have been addressed


Pre-Procedure Room:
 12

## 2024-05-17 ENCOUNTER — TELEPHONE (OUTPATIENT)
Dept: GASTROENTEROLOGY | Facility: CLINIC | Age: 44
End: 2024-05-17
Payer: COMMERCIAL

## 2024-05-17 NOTE — TELEPHONE ENCOUNTER
Called and spoke with the patient, patient notified that her procedure needed to be rescheduled as Dr. Colunga is not able to perform her procedure next week, procedure rescheduled, patient verbalized understanding of this.

## 2024-05-27 ENCOUNTER — PATIENT MESSAGE (OUTPATIENT)
Dept: HEPATOLOGY | Facility: CLINIC | Age: 44
End: 2024-05-27

## 2024-05-27 ENCOUNTER — OFFICE VISIT (OUTPATIENT)
Dept: HEPATOLOGY | Facility: CLINIC | Age: 44
End: 2024-05-27
Payer: COMMERCIAL

## 2024-05-27 DIAGNOSIS — K74.00 LIVER FIBROSIS: ICD-10-CM

## 2024-05-27 DIAGNOSIS — I10 PRIMARY HYPERTENSION: ICD-10-CM

## 2024-05-27 DIAGNOSIS — E11.9 TYPE 2 DIABETES MELLITUS WITHOUT COMPLICATION, WITHOUT LONG-TERM CURRENT USE OF INSULIN: ICD-10-CM

## 2024-05-27 DIAGNOSIS — K75.81 NASH (NONALCOHOLIC STEATOHEPATITIS): Primary | ICD-10-CM

## 2024-05-27 DIAGNOSIS — E66.9 CLASS 1 OBESITY WITH BODY MASS INDEX (BMI) OF 33.0 TO 33.9 IN ADULT, UNSPECIFIED OBESITY TYPE, UNSPECIFIED WHETHER SERIOUS COMORBIDITY PRESENT: ICD-10-CM

## 2024-05-27 DIAGNOSIS — E78.5 HYPERLIPIDEMIA, UNSPECIFIED HYPERLIPIDEMIA TYPE: ICD-10-CM

## 2024-05-27 PROBLEM — E66.811 CLASS 1 OBESITY WITH BODY MASS INDEX (BMI) OF 33.0 TO 33.9 IN ADULT: Status: ACTIVE | Noted: 2021-06-23

## 2024-05-27 PROCEDURE — 1159F MED LIST DOCD IN RCRD: CPT | Mod: CPTII,95,, | Performed by: NURSE PRACTITIONER

## 2024-05-27 PROCEDURE — 99214 OFFICE O/P EST MOD 30 MIN: CPT | Mod: 95,,, | Performed by: NURSE PRACTITIONER

## 2024-05-27 PROCEDURE — 3066F NEPHROPATHY DOC TX: CPT | Mod: CPTII,95,, | Performed by: NURSE PRACTITIONER

## 2024-05-27 PROCEDURE — 1160F RVW MEDS BY RX/DR IN RCRD: CPT | Mod: CPTII,95,, | Performed by: NURSE PRACTITIONER

## 2024-05-27 PROCEDURE — 3044F HG A1C LEVEL LT 7.0%: CPT | Mod: CPTII,95,, | Performed by: NURSE PRACTITIONER

## 2024-05-27 PROCEDURE — 3060F POS MICROALBUMINURIA REV: CPT | Mod: CPTII,95,, | Performed by: NURSE PRACTITIONER

## 2024-05-27 PROCEDURE — 4010F ACE/ARB THERAPY RXD/TAKEN: CPT | Mod: CPTII,95,, | Performed by: NURSE PRACTITIONER

## 2024-05-27 RX ORDER — RESMETIROM 100 MG/1
100 TABLET, COATED ORAL DAILY
Qty: 30 TABLET | Refills: 11 | Status: ACTIVE | OUTPATIENT
Start: 2024-05-27 | End: 2024-06-11

## 2024-05-27 RX ORDER — TIRZEPATIDE 5 MG/.5ML
INJECTION, SOLUTION SUBCUTANEOUS
COMMUNITY
Start: 2024-05-07

## 2024-05-27 RX ORDER — PIMECROLIMUS 10 MG/G
CREAM TOPICAL
COMMUNITY
Start: 2024-05-24

## 2024-05-27 RX ORDER — SYRINGE WITH NEEDLE, 1 ML 25GX5/8"
SYRINGE, EMPTY DISPOSABLE MISCELLANEOUS
COMMUNITY
Start: 2024-05-04

## 2024-05-27 NOTE — PATIENT INSTRUCTIONS
The FDA has approved Rezdiffra to treat HERNANDEZ in patients with stage 2 and stage 3 fibrosis (scarring).    The most common side effects include nausea, diarrhea, itching, vomiting, abdominal pain, constipation, gallstones, cholecystitis    It is a weight based medication. You would start at 100 mg daily.    Medications that you cannot take with Rezdiffra: Gemfibrozil (ok to take Fenofibrate, just not Gemfibrozil)    Medications that need dose adjustments:  -- Plavix (can only use 60-80 mg of Rezdiffra)  -- statins  Max dose of Pravastatin/Atorvastatin = 40 mg daily  Max dose of Simvastatin/Rosuvastatin = 20 mg daily    Recommended lab monitoring after starting  -- labs 3 and 6 months after starting  -- if labs stable then, can do yearly    Avoid with GFR <30

## 2024-05-27 NOTE — PROGRESS NOTES
The patient location is: [place]  The chief complaint leading to consultation is: HERNANDEZ    Visit type: audiovisual    Face to Face time with patient: 15  30 minutes of total time spent on the encounter, which includes face to face time and non-face to face time preparing to see the patient (eg, review of tests), Obtaining and/or reviewing separately obtained history, Documenting clinical information in the electronic or other health record, Independently interpreting results (not separately reported) and communicating results to the patient/family/caregiver, or Care coordination (not separately reported).     Each patient to whom he or she provides medical services by telemedicine is:  (1) informed of the relationship between the physician and patient and the respective role of any other health care provider with respect to management of the patient; and (2) notified that he or she may decline to receive medical services by telemedicine and may withdraw from such care at any time.    Notes:       Hepatology Consultation: [facility]    ESTABLISHED PATIENT    Subjective      Ms. Earl is a 43 y.o. female who returns for continued evalation of elevated LFTs and hepatic steatosis. LFT review suggest mixed picture with mild alk phos elevations as well as ALT>AST; recently albumin a bit low. Plt WNL. Hepatomegaly + steatosis, without splenic enlargement on ultrasound 4/6/2021     RF for fatty liver include HTN, HLD, T2DM,   Weight gain in the past year or so, BMI 42  CCY 2007 8 back surgeries total -- she has a pain pump for this    We obtained a liver biopsy due to worsening elevated liver enzymes and MRE that was nondiagnostic due to patient's pan pump 5/14/2021.    Liver biopsy 6/16/2021  Final Pathologic Diagnosis Native liver (random biopsy):   - Macrovesicular steatosis 70%, microvesicular steatosis 15%, with   steatohepatitis (see CODI grading form below)   -  Steatohepatitis score 7, stage 2   - Negative for Ivett-Denk bodies   - Iron: No deposit (Grade 0/4)   - Trichrome: Stage 2/4 fibrosis   - Iron and trichrome stains with appropriate controls   CODI grade score 7/8   Fatty liver 3 (>67%)   Inflammation 2 (2 to 4 foci)   Balloon cells 2 (many)      She was thereafter considered for clinical trial but was not a candidate due to use of Ozempic and that she had lost significant amount of weight.     She lost weight at follow-up visit 12/2021 with improvement of liver enzymes. Switched to tradjenta with success. Unable to consult with nutritionist since.    Interval history 07/25/2022:  Miri Earl arrives today alone on video   She denies symptoms of hepatic decompensation including jaundice, abdominal distention or lower extremity swelling, hematemesis, melena, or periods of confusion suggestive of hepatic encephalopathy.  She continues tradjenta - working well and is currently at 255 lbs -- she has plateaued. Is no longer keeping a food diary.   Reflux has worsened , continued stomach pain - seeing GI  Fatigue is still there.    Interval history 07/25/2023:  Miri Earl arrives today alone on video  She is down to 239 lbs. Feeling good from that standpoint.   She did have her pain stimulator replaced last year  She continues to suffer with constipation and seeing gastro for this - now is going daily with Linzess.  Denies symptoms of hepatic decompensation including jaundice, ascites, cognitive problems to suggest hepatic encephalopathy, or GI bleeding.   She reports significant nighttime itching 2-3 weeks, on a daily antihistamine Zyrtec  Nerve block prior to liver labs - took benadryl prior     Interval history 08/10/2023:  Miri Earl arrives today alone. Since our last visit, doing fine. Itching has improved and uses benadryl occasionally.  She is potentially interested in GLP-1, next a1c and endocrine meeting is in September.    Denies symptoms of hepatic  decompensation including jaundice, abdominal distention or lower extremity swelling, hematemesis, melena, or periods of confusion suggestive of hepatic encephalopathy.    ETOH: rarely   Smoking: no   Illicit substances: no   Social: lives at home with young children   Prior liver biopsy: yes, 2001 @ [facility]   Prior liver disease: yes, fatty liver   FAMILY HISTORY: denies a history of liver cancer, cirrhosis, hepatitis; but mother w/ fatty liver.    Interval history 5/27/2024:  April D Rajat arrives today alone. She was last seen in clinic by Alice Sen PA-C in 8/2023.  Prior liver biopsy in 2021 showed HERNANDEZ with stage 2 fibrosis. Follow up Fibroscan in 8/2023 was suggestive of F3 (advanced) fibrosis.   She is on Mounjaro and has lost 21 lbs since last visit. Her LFT's have normalized with weight loss. B/P is well controlled. HgbA1c is also improved with weight loss (5.7% - 1/2024).  Most recent abdominal US showed:    US Abdomen Limited  Narrative: EXAMINATION:  US ABDOMEN LIMITED    CLINICAL HISTORY:  Nonalcoholic steatohepatitis (HERNANDEZ)    TECHNIQUE:  Limited ultrasound of the right upper quadrant of the abdomen (including pancreas, liver, gallbladder, common bile duct, and spleen) was performed.    COMPARISON:  None.    FINDINGS:  Visualized pancreas is unremarkable.  The IVC is normal caliber.  There has been a cholecystectomy.  The common bile duct is normal caliber at 5 mm.  The liver is normal in size at 16 cm.  It demonstrates increased parenchymal echogenicity in keeping with fatty infiltration.  The spleen is normal size at 9.5 cm.  Impression: Hepatic steatosis.    Cholecystectomy.    Electronically signed by: Keagan Triana MD  Date:    05/06/2024  Time:    09:25    She is well appearing and denies signs or symptoms of hepatic decompensation including jaundice, abdominal distention or lower extremity swelling, hematemesis, melena, or periods of confusion suggestive of hepatic  encephalopathy.    PMH April has a past medical history of Anxiety, Arthritis, Asthma, Breast cyst, Chronic back pain, Colon polyp, Depression, Diabetes mellitus, Elevated liver enzymes, Fibrocystic breast, Fibromyalgia, GERD (gastroesophageal reflux disease), History of Chiari malformation, History of colitis, History of gastritis, Hypertension, Hypokalemia, IBS (irritable bowel syndrome), Insomnia, MVP (mitral valve prolapse), HERNANDEZ (nonalcoholic steatohepatitis) (2021), Neuropathy, Postmenopausal HRT (hormone replacement therapy), Seasonal allergies, Sleep apnea with use of continuous positive airway pressure (CPAP), Spondylitis, and Thyroid nodule.   PSXH April has a past surgical history that includes Ganglion cyst excision (Right); Hysterectomy (10/2007  Manisha); Appendectomy (10/15/2009  Kristen);  section; Cholecystectomy; Back surgery; Lumbar discectomy; Lumbar fusion; Colonoscopy w/ polypectomy (10/31/2008  Keanu); Liver biopsy (10/20/2008); Pelvic laparoscopy; Spinal cord stimulator implant; Back surgery; Back surgery; pain pump; neurostimulator removal ; Oophorectomy; Myelography (N/A, 2019); Selective injection of anesthetic agent around lumbar spinal nerve root by transforaminal approach (Bilateral, 2019); Esophagogastroduodenoscopy (08/15/2014); Upper gastrointestinal endoscopy (10/31/2008  Keanu); Esophagogastroduodenoscopy (N/A, 10/21/2020); Colonoscopy (  [place]); Colonoscopy (08/15/2014); Colonoscopy (N/A, 10/21/2020); Esophagogastroduodenoscopy (N/A, 2022); and Neck surgery.    April's family history includes Diabetes in her father and mother; Hypertension in her father and mother.    April reports that she has been smoking cigarettes and vaping with nicotine. She started smoking about 8 months ago. She has a 17 pack-year smoking history. She has never used smokeless tobacco. She reports that she does not currently use alcohol. She reports current drug use.  Drugs: Oxycodone and Morphine.   ELVER Chery is allergic to doxycycline; iodinated contrast media; latex, natural rubber; shellfish containing products; morphine; and povidone-iodine.   KAILYN Chery has a current medication list which includes the following prescription(s): albuterol, atorvastatin, budesonide, cetirizine, cholecalciferol (vitamin d3), clobetasol 0.05%, cyanocobalamin, cymbalta, dexlansoprazole, epinephrine, famotidine, gabapentin, levalbuterol, linzess, lisinopril, metoprolol succinate, montelukast, naloxone, ondansetron, oxycodone, potassium chloride, promethazine, sodium chloride 0.9% 0.9 % SolP with bupivacaine 0.5% (5 mg/ml) 0.5 % (5 mg/mL) Soln 0.1 %, tirzepatide, and tirzepatide.     ROS:   As per hpi     Objective     SKIN/EYES: No obvious jaundice or yellowing of the eyes.   HENT: Normocephalic, without obvious abnormality.   NECK: No obvious masses.   RESPIRATORY: Normal respiratory effort.   GI: Obese abdomen.   EXT: No peripheral edema, per patient report.   PSYCH: Thought and speech pattern appropriate.     Lab Results   Component Value Date    ALT 30 05/06/2024    AST 21 05/06/2024    ALKPHOS 121 05/06/2024    BILITOT 0.4 05/06/2024    ALBUMIN 3.5 05/06/2024    INR 1.0 05/06/2024     05/06/2024     Lab Results   Component Value Date    AFP 2.0 05/06/2024     Prior serologic workup:   Lab Results   Component Value Date    SMOOTHMUSCAB <1:20 06/07/2021    AMAIFA Negative 10/14/2008    IGGSERUM 1004 06/07/2021    ANASCREEN None Detected 04/12/2024    FERRITIN 138 (H) 06/07/2021    HEPBSAG Negative 07/18/2022    HEPCAB Negative 07/18/2022    HEPAIGM Negative 09/21/2020     DIAGNOSTIC STUDIES:    6/16/2021  Native liver (random biopsy):   - Macrovesicular steatosis 70%, microvesicular steatosis 15%, with   steatohepatitis (see CODI grading form below)   - Steatohepatitis score 7, stage 2   - Negative for Ivett-Denk bodies   - Iron: No deposit (Grade 0/4)   - Trichrome: Stage 2/4 fibrosis    - Iron and trichrome stains with appropriate controls   CODI grade score 7/8   Fatty liver 3 (>67%)   Inflammation 2 (2 to 4 foci)   Balloon cells 2 (many)     US Abdomen Limited  EXAMINATION:  US ABDOMEN LIMITED 07/18/2022 at 09:39    INDICATION:  Clinical history is provided of hepatic steatosis, abnormal laboratory values.  Previous cholecystectomy.  No history of recent trauma or surgery is provided.    COMPARISON  CT abdomen report of 07/12/2021.  Abdominal ultrasound report of 04/06/2021.    FINDINGS  No free fluid collections are appreciated. The liver measures 19.8 cm and demonstrates heterogeneous, hyperechoic echogenicity.  No gross contour deformity is appreciated.  Portal venous flow is within normal.  The common bile duct measures 6 mm.  The pancreas is largely obscured. There is bowel gas, penetration artifact present.  No fluid-filled gallbladder is appreciated at the anatomic location corresponding to the clinical history provided.  No history of tenderness is provided. The right kidney measures 13.5 x 5.4 x 5.2 cm. No echogenic obstructive calculus or hydronephrosis is appreciated. The proximal aorta measures 1.9 cm, mid 1.5 cm and distal 1.2 cm with unremarkable adjacent IVC color Doppler flow demonstrated.  No other significant interval changes are appreciated.    IMPRESSION  1.  No free fluid collection is appreciated.  2. There is heterogeneous, hyperechoic echotexture of the liver overall similar.  This could be seen with steatosis, chronic hepatic changes.  3.  No echogenic obstructive calculus, biliary dilatation or hydronephrosis is appreciated.    Electronically signed by: Juan Humphrey MD  Date:    07/18/2022  Time:    10:59    FIBROSCAN 8/10/2023:    Findings  Median liver stiffness score:  10.9  CAP Reading: dB/m:  308     IQR/med %:  6  Interpretation  Fibrosis interpretation is based on medial liver stiffness - Kilopascal (kPa).     Fibrosis Stage:  F3  Steatosis interpretation is based  on controlled attenuation parameter - (dB/m).     Steatosis Grade:  S3    US Abdomen Limited  Narrative: EXAMINATION:  US ABDOMEN LIMITED    CLINICAL HISTORY:  Nonalcoholic steatohepatitis (HERNANDEZ)    TECHNIQUE:  Limited ultrasound of the right upper quadrant of the abdomen (including pancreas, liver, gallbladder, common bile duct, and spleen) was performed.    COMPARISON:  None.    FINDINGS:  Visualized pancreas is unremarkable.  The IVC is normal caliber.  There has been a cholecystectomy.  The common bile duct is normal caliber at 5 mm.  The liver is normal in size at 16 cm.  It demonstrates increased parenchymal echogenicity in keeping with fatty infiltration.  The spleen is normal size at 9.5 cm.  Impression: Hepatic steatosis.    Cholecystectomy.    Electronically signed by: Keagan Triana MD  Date:    05/06/2024  Time:    09:25    Assessment/Plan     43 y.o. White female with:    1. HERNANDEZ (nonalcoholic steatohepatitis)  resmetirom (REZDIFFRA) 100 mg Tab    Comprehensive metabolic panel      2. Liver fibrosis, F2, biopsy proven 6/2021  resmetirom (REZDIFFRA) 100 mg Tab    Comprehensive metabolic panel      3. Class 1 obesity with body mass index (BMI) of 33.0 to 33.9 in adult, unspecified obesity type, unspecified whether serious comorbidity present  resmetirom (REZDIFFRA) 100 mg Tab    Comprehensive metabolic panel      4. Type 2 diabetes mellitus without complication, without long-term current use of insulin  resmetirom (REZDIFFRA) 100 mg Tab    Comprehensive metabolic panel      5. Hyperlipidemia, unspecified hyperlipidemia type  resmetirom (REZDIFFRA) 100 mg Tab    Comprehensive metabolic panel      6. Primary hypertension  resmetirom (REZDIFFRA) 100 mg Tab    Comprehensive metabolic panel        - RX for Rezdiffra 100 mg PO daily for the treatment of HERNANDEZ with fibrosis sent to [facility].  - Repeat CMP 3 months after initiation of treatment.  - Recommend an Ultrasound of the liver annually, next due 5/2025.  -  Continue Mounjaro. Recommend additional weight loss goal of 20-25 lbs, through diet and exercise.  - Recommend good control of cholesterol, blood pressure, & blood sugar levels.  - Avoid alcohol and herbal supplements/alternative remedies.  - Return to clinic in 3 months after starting Rezdiffra, with labs drawn prior to the visit.     I spent 30 minutes on the day of this encounter preparing for, evaluating, treating, and managing this patient.     Thank you for allowing me to participate in the care of Miri Earl       Hepatology Nurse Practitioner  [facility] [place]

## 2024-06-04 ENCOUNTER — PATIENT MESSAGE (OUTPATIENT)
Dept: GASTROENTEROLOGY | Facility: CLINIC | Age: 44
End: 2024-06-04
Payer: COMMERCIAL

## 2024-06-04 ENCOUNTER — OFFICE VISIT (OUTPATIENT)
Dept: UROLOGY | Facility: CLINIC | Age: 44
End: 2024-06-04
Payer: COMMERCIAL

## 2024-06-04 VITALS — HEIGHT: 68 IN | WEIGHT: 222 LBS | BODY MASS INDEX: 33.65 KG/M2

## 2024-06-04 DIAGNOSIS — R39.15 URINARY URGENCY: ICD-10-CM

## 2024-06-04 DIAGNOSIS — N32.81 OAB (OVERACTIVE BLADDER): Primary | ICD-10-CM

## 2024-06-04 DIAGNOSIS — Z79.2 PROPHYLACTIC ANTIBIOTIC: ICD-10-CM

## 2024-06-04 LAB
BILIRUBIN, UA POC OHS: NEGATIVE
BLOOD, UA POC OHS: NEGATIVE
CLARITY, UA POC OHS: CLEAR
COLOR, UA POC OHS: YELLOW
GLUCOSE, UA POC OHS: NEGATIVE
KETONES, UA POC OHS: NEGATIVE
LEUKOCYTES, UA POC OHS: NEGATIVE
NITRITE, UA POC OHS: NEGATIVE
PH, UA POC OHS: 7
POC RESIDUAL URINE VOLUME: 0 ML (ref 0–100)
PROTEIN, UA POC OHS: 30
SPECIFIC GRAVITY, UA POC OHS: 1.01
UROBILINOGEN, UA POC OHS: 1

## 2024-06-04 PROCEDURE — 99213 OFFICE O/P EST LOW 20 MIN: CPT | Mod: S$GLB,,,

## 2024-06-04 PROCEDURE — 3066F NEPHROPATHY DOC TX: CPT | Mod: CPTII,S$GLB,,

## 2024-06-04 PROCEDURE — 3060F POS MICROALBUMINURIA REV: CPT | Mod: CPTII,S$GLB,,

## 2024-06-04 PROCEDURE — 3008F BODY MASS INDEX DOCD: CPT | Mod: CPTII,S$GLB,,

## 2024-06-04 PROCEDURE — 1159F MED LIST DOCD IN RCRD: CPT | Mod: CPTII,S$GLB,,

## 2024-06-04 PROCEDURE — 81003 URINALYSIS AUTO W/O SCOPE: CPT | Mod: QW,S$GLB,,

## 2024-06-04 PROCEDURE — 1160F RVW MEDS BY RX/DR IN RCRD: CPT | Mod: CPTII,S$GLB,,

## 2024-06-04 PROCEDURE — 51798 US URINE CAPACITY MEASURE: CPT | Mod: S$GLB,,,

## 2024-06-04 PROCEDURE — 99999 PR PBB SHADOW E&M-EST. PATIENT-LVL IV: CPT | Mod: PBBFAC,,,

## 2024-06-04 PROCEDURE — 3044F HG A1C LEVEL LT 7.0%: CPT | Mod: CPTII,S$GLB,,

## 2024-06-04 PROCEDURE — 4010F ACE/ARB THERAPY RXD/TAKEN: CPT | Mod: CPTII,S$GLB,,

## 2024-06-04 RX ORDER — NITROFURANTOIN MACROCRYSTALS 50 MG/1
100 CAPSULE ORAL NIGHTLY
Qty: 60 CAPSULE | Refills: 6 | Status: SHIPPED | OUTPATIENT
Start: 2024-06-04 | End: 2025-03-11

## 2024-06-04 RX ORDER — SOLIFENACIN SUCCINATE 5 MG/1
5 TABLET, FILM COATED ORAL DAILY
Qty: 30 TABLET | Refills: 11 | Status: SHIPPED | OUTPATIENT
Start: 2024-06-04 | End: 2025-06-04

## 2024-06-04 NOTE — PROGRESS NOTES
[facility] Note  Staff: Heike Sanz FNP-C    PCP: LESLIE Trammell    Chief Complaint: OAB Follow Up    Subjective:        HPI: April D Rajat is a 43 y.o. female presents today for OAB follow up. At her last office visit we prescribed gemtesa but this was denied by her insurance. She has failed oxybutynin and myrbetriq in the past. She has undergone cystoscopy in the past by Dr. Berry which was negative. She does suffer with significant constipation for which she takes linzess. We discussed this as a contributing factor to urinary urgency.     She denies dysuria, hematuria, fever, flank pain, abd pain, and difficulty urinating.     We discussed a trial of vesicare and following up with Dr. Kaur for possible further evaluation and treatment.     Questions asked pt during office visit today:  DTF: every 30 minutes- hour, NTF: 2 x night  Urgency:Yes , incontinence with urgency? Yes ;   DysuriaNo  Gross Hematuria No    History of Kidney Stones?:  Yes    Constipation issues?:  Yes, takes linzess    PVR by bladder scan performed by MA today:  0 mL    REVIEW OF SYSTEMS:  Review of Systems   Constitutional: Negative.  Negative for chills and fever.   HENT: Negative.     Eyes: Negative.    Respiratory: Negative.     Cardiovascular: Negative.    Gastrointestinal:  Positive for constipation. Negative for abdominal pain, diarrhea and vomiting.   Genitourinary:  Positive for frequency and urgency. Negative for dysuria, flank pain and hematuria.   Musculoskeletal: Negative.  Negative for back pain.   Skin: Negative.    Neurological: Negative.    Endo/Heme/Allergies: Negative.    Psychiatric/Behavioral: Negative.         PMHx:  Past Medical History:   Diagnosis Date    Anxiety     Arthritis     Asthma     Breast cyst     Chronic back pain     Colon polyp     Depression     Diabetes mellitus     Elevated liver enzymes     Fibrocystic breast     Fibromyalgia     GERD (gastroesophageal reflux disease)      History of Chiari malformation     History of colitis     History of gastritis     Hypertension     Hypokalemia     IBS (irritable bowel syndrome)     Insomnia     MVP (mitral valve prolapse)     HERNANDEZ (nonalcoholic steatohepatitis) 2021    Neuropathy     Postmenopausal HRT (hormone replacement therapy)     Seasonal allergies     Sleep apnea with use of continuous positive airway pressure (CPAP)     Spondylitis     Thyroid nodule        PSHx:  Past Surgical History:   Procedure Laterality Date    APPENDECTOMY  10/15/2009  [place]    BACK SURGERY      BACK SURGERY      xs 4    BACK SURGERY       SECTION      x 3    CHOLECYSTECTOMY      COLONOSCOPY    [place]    COLONOSCOPY  08/15/2014    Dr. Colunga: 1 colon polyp removed, hemorrhoids, Mild colonic spasm consistent with irritable bowel syndrome; repeat in 5 years for surveillance; biopsy: TUBULAR ADENOMA.    COLONOSCOPY N/A 10/21/2020    Procedure: COLONOSCOPY;  Surgeon: Earl Colunga Jr., MD; 2 colon polyps removed, hemorrhoids, Mild colonic spasm consistent with irritable bowel syndrome. repeat in 5 years for surveillance; biopsy: polyps- TUBULAR ADENOMA & hyperplastic polyp; random TI & colon biopsies WNL    COLONOSCOPY W/ POLYPECTOMY  10/31/2008  Keanu    2 mm polyp in the sigmoid colon, TUBULAR ADENOMATOUS POLYP.  Erythematous mucosa rectum, proctitis (prep  proctitis?). Irritable bowel syndrome.     ESOPHAGOGASTRODUODENOSCOPY  08/15/2014    Dr. Colunga: Reflux esophagitis, No endoscopic esophageal abnormality to explain patient's dysphagia. Esophagus dilated, 54FR, history/examination was suspicious for an esophageal spasm; gastritis; biopsy: stomach- unremarkable, bishop test negative    ESOPHAGOGASTRODUODENOSCOPY N/A 10/21/2020    Surgeon: Earl Colunga Jr., MD;  Location: [facility] END[facility]; Reflux esophagitis; Gastritis. Enlarged gastric folds; No endoscopic esophageal abnormality to explain patient's dysphagia. Esophagus dilated, 51FR; biopsy:  duodenum WNL; stomach- REACTIVE GASTRITIS, negative H pylori    ESOPHAGOGASTRODUODENOSCOPY N/A 05/04/2022    Procedure: EGD (ESOPHAGOGASTRODUODENOSCOPY);  Surgeon: Earl Colunga Jr., MD;  Location: [facility];  Service: Endoscopy;  Laterality: N/A; Slight antritis, esophageal dilation performed; biopsy: stomach- mild chronic inactive gastritis with focal intestinal metaplasia (complete type), negative for dysplasia; negative for h pylori    GANGLION CYST EXCISION Right     HYSTERECTOMY  10/2007  Manisha    LIVER BIOPSY  10/20/2008    Mild portal mixed leukocytic infiltrate,neutrophils and lymphocytes.  Bile ducts seen, negative for leukocytic infiltration.  Negative for steatohepatitis. Mild sinusoidal dilatation and congestion. Negative for granulomata nor malignancy.  Reticulum stain negative for hepaticfibrosis.  Negative iron stain.  PAS stain, negative for PAS-positive inclusions.    LUMBAR DISCECTOMY      LUMBAR FUSION      MYELOGRAPHY N/A 01/07/2019    Procedure: Myelogram lumbar and thoracic;  Surgeon: Hari Rich MD;  Location: [facility];  Service: Radiology;  Laterality: N/A;    NECK SURGERY      neurostimulator removal       OOPHORECTOMY      pain pump      PELVIC LAPAROSCOPY      SELECTIVE INJECTION OF ANESTHETIC AGENT AROUND LUMBAR SPINAL NERVE ROOT BY TRANSFORAMINAL APPROACH Bilateral 01/30/2019    Procedure: BLOCK, SPINAL NERVE ROOT, LUMBAR, SELECTIVE, TRANSFORAMINAL APPROACH; L3;  Surgeon: Tez Edouard MD;  Location: [facility];  Service: Pain Management;  Laterality: Bilateral;    SPINAL CORD STIMULATOR IMPLANT      UPPER GASTROINTESTINAL ENDOSCOPY  10/31/2008  Keanu    Normal esophagus. Lax LES, non-erosive esophageal reflux (NERD?) Erythematous gastropathy on greater curve.  PRABHU Test performed on 10/31/08 was Negative.        Fam Hx:   malignancies: No , gyn malignancies: No   kidney stones: No     Soc Hx:  , lives in [place]    Allergies:  Doxycycline; Iodinated contrast media;  Latex, natural rubber; Shellfish containing products; Morphine; and Povidone-iodine    Medications: reviewed     Objective:   There were no vitals filed for this visit.    Physical Exam  Constitutional:       Appearance: Normal appearance.   HENT:      Head: Normocephalic.      Mouth/Throat:      Mouth: Mucous membranes are moist.   Eyes:      Conjunctiva/sclera: Conjunctivae normal.   Pulmonary:      Effort: Pulmonary effort is normal.   Abdominal:      General: There is no distension.      Palpations: Abdomen is soft.      Tenderness: There is no abdominal tenderness.   Musculoskeletal:         General: Normal range of motion.      Cervical back: Normal range of motion.   Skin:     General: Skin is warm.   Neurological:      Mental Status: She is alert and oriented to person, place, and time.   Psychiatric:         Mood and Affect: Mood normal.         Behavior: Behavior normal.         LABS REVIEW:  UA today:   Color:Clear, Yellow  Spec. Grav.  1.015  PH  7.0  Negative for leukocytes, nitrates, glucose, ketones, urobili, bili, and blood.  Positive protein    Assessment:       1. OAB (overactive bladder)    2. Prophylactic antibiotic    3. Urinary urgency          Plan:      Discussed conservative measures to control urgency and frequency including avoiding bladder irritants, timed voiding, not postponing voiding, and bowel regimen (as distended bowel has extrinsic compressive effect on bladder. Discussed bladder irritants include coffe (even decaf), tea, alcohol, soda, spicy foods, acidic juices (orange, tomato), vinegar, and artificial sweeteners.  Vesicare 5mg prescribed to pt today as trial to see if med improves pt's current LUTS.  Benefits, risks and side affects were thoroughly explained to pt today in office with all questions answered.  Macrobid 50mg as needed after sexual activity prescribed    F/u 8 weeks with Dr. [facility]elocke [facility]: Active    ELADIO Andrews

## 2024-06-11 ENCOUNTER — TELEPHONE (OUTPATIENT)
Dept: HEPATOLOGY | Facility: CLINIC | Age: 44
End: 2024-06-11
Payer: COMMERCIAL

## 2024-06-11 DIAGNOSIS — E11.9 TYPE 2 DIABETES MELLITUS WITHOUT COMPLICATION, WITHOUT LONG-TERM CURRENT USE OF INSULIN: ICD-10-CM

## 2024-06-11 DIAGNOSIS — I10 ESSENTIAL HYPERTENSION: ICD-10-CM

## 2024-06-11 RX ORDER — LISINOPRIL 2.5 MG/1
2.5 TABLET ORAL
Qty: 90 TABLET | Refills: 3 | Status: SHIPPED | OUTPATIENT
Start: 2024-06-11

## 2024-06-11 NOTE — TELEPHONE ENCOUNTER
Called patient. Virtual visit has been scheduled for 1/2025.        Zelda Porras, Lacey Berg, PharmD; P Chirag Ndiaye Staff  Caller: Unspecified (Today, 11:47 AM)  That's unfortunate, but thanks for the update Jael Andrews - please call patient to schedule a f/u visit with me in 1/2025 (virtual visit okay). Hopefully by then, the patient's insurance formulary will allow for her to be prescribed Rezdiffra for the treatment of HERNANDEZ with fibrosis. Thank you!

## 2024-06-18 ENCOUNTER — HOSPITAL ENCOUNTER (OUTPATIENT)
Dept: RADIOLOGY | Facility: HOSPITAL | Age: 44
Discharge: HOME | End: 2024-06-18
Attending: NURSE PRACTITIONER
Payer: COMMERCIAL

## 2024-06-18 DIAGNOSIS — Z12.39 ENCOUNTER FOR SCREENING FOR MALIGNANT NEOPLASM OF BREAST, UNSPECIFIED SCREENING MODALITY: ICD-10-CM

## 2024-06-18 PROCEDURE — 77063 BREAST TOMOSYNTHESIS BI: CPT | Mod: 26,,, | Performed by: RADIOLOGY

## 2024-06-18 PROCEDURE — 77067 SCR MAMMO BI INCL CAD: CPT | Mod: 26,,, | Performed by: RADIOLOGY

## 2024-06-18 PROCEDURE — 77067 SCR MAMMO BI INCL CAD: CPT | Mod: TC,PO

## 2024-06-20 RX ORDER — POTASSIUM CHLORIDE 750 MG/1
TABLET, EXTENDED RELEASE ORAL
Qty: 180 TABLET | Refills: 3 | Status: SHIPPED | OUTPATIENT
Start: 2024-06-20

## 2024-06-28 RX ORDER — TIRZEPATIDE 7.5 MG/.5ML
INJECTION, SOLUTION SUBCUTANEOUS
Qty: 2 ML | Refills: 6 | Status: SHIPPED | OUTPATIENT
Start: 2024-06-28

## 2024-07-01 DIAGNOSIS — K59.00 SIMPLE CONSTIPATION: ICD-10-CM

## 2024-07-01 RX ORDER — LINACLOTIDE 145 UG/1
145 CAPSULE, GELATIN COATED ORAL
Qty: 30 CAPSULE | Refills: 11 | Status: ON HOLD | OUTPATIENT
Start: 2024-07-01 | End: 2024-07-02

## 2024-07-01 NOTE — H&P
History & Physical - Short Stay  Gastroenterology      SUBJECTIVE:     Procedure: Gastroscopy    Chief Complaint/Indication for Procedure: Dysphagia    History of Present Illness:  See recent GI OV note:  Office Visit  2/29/2024  [place] - Gastroenterology       Earl Colunga Jr., MD  Gastroenterology Dysphagia, unspecified type +2 more  Dx Gastroesophageal Reflux  Reason for Visit     Progress Notes    Earl Colunga Jr., MD at 2/29/2024  3:00 PM    Status: Signed   Expand All Collapse All     Subjective  Patient ID: April D Rajat is a 43 y.o. female.     Chief Complaint: Gastroesophageal Reflux     Ms. Earl is in for routine follow-up, of her reflux.  See her most recent GI office visit note below.  However, she reports she is having dysphagia again.  She finds it is worst with ground meat, bread, and rice.  But 1 positive report is that Gaviscon worked on the chest pain she was having.        See last GI OV 9/25/2023:     Ms. Earl returns for routine follow-up, of her history of reflux.  She reports she is doing pretty well at present in that regard.  Occasionally, she has the feeling that food is going to get stuck on the way down, but it has not actually happened.  Also, if she goes a long time without eating, her chest was start hurting.  But as soon as she then eats, it starts feeling better.  She continues to take the Dexilant 60 mg every morning, and Pepcid 40 mg at night.  As for her constipation, she was changed to Linzess 145 mcg, and is now having a bowel movement just about every day.     She also reports that she has been able to lose a little weight.  She reports that her weight is down to 241 (although here today on our scale, she weighed 245.) (and this is from a max weight of 290, 2 years ago.)     Note:  She is on Tradjenta, but has been on that for about 5 years.           See last Upper GI endoscopy 5/4/2022   Indications:           Pharyngeal phase dysphagia, Esophageal reflux,  Nausea   Comorbidities       Hypertension, Morbid obesity (BMI 39), Hyperlipidemia, Asthma   Providers:             Earl Colunga MD  Impression:            - Normal oropharynx.                          - Normal cricopharyngeus.                          - Normal esophagus.                          - Z-line regular, 39 cm from the incisors.                          - Patulous lower esophageal sphincter.                          - No endoscopic esophageal abnormality to explain patient's dysphagia. Esophagus dilated, 54 Fr.                          - Normal cardia.                          - Slight antritis. Biopsied.                          - Normal stomach otherwise.                          - Normal pylorus.                          - Normal examined duodenum.                          - Normal major papilla.   Recommendation:        - Discharge patient to home.                          - Follow an antireflux regimen.                          - Exercise and weight loss.                          - Continue present medications.                          - Use Dexilant (dexlansoprazole) 60 mg PO daily.                          - Use Pepcid (famotidine) 40 mg PO nightly.                        .   Earl Colunga MD   5/4/2022      STOMACH, PREPYLORIC, BIOPSY:   -  Benign antral mucosa showing mild chronic inactive gastritis with focal   intestinal metaplasia (complete type), negative for dysplasia.   -  No Helicobacter organisms identified by immunostain.                Review of Systems   Constitutional:  Negative for appetite change, fever and unexpected weight change.   HENT:  Positive for trouble swallowing (Especially:  Ground meat, bread, rice.). Negative for mouth sores and sore throat.    Eyes: Negative.    Respiratory: Negative.  Negative for cough and choking.    Cardiovascular: Negative.  Negative for chest pain and leg swelling.   Gastrointestinal:  Positive for reflux. Negative for abdominal  distention, abdominal pain, anal bleeding, blood in stool, constipation, diarrhea, nausea and vomiting.     Assessment and Plan  Dysphagia, unspecified type     Gastroesophageal reflux disease, unspecified whether esophagitis present     Class 2 obesity due to excess calories with body mass index (BMI) of 35.0 to 35.9 in adult, unspecified whether serious comorbidity present        Continue the meds same for now.  Schedule for EGD with dilation.  Further recs to follow.             PTA Medications   Medication Sig    albuterol (PROVENTIL/VENTOLIN HFA) 90 mcg/actuation inhaler INHALE 2 PUFFS BY MOUTH EVERY 6 HOURS AS NEEDED FOR WHEEZING    atorvastatin (LIPITOR) 20 MG tablet Take 1 tablet (20 mg total) by mouth every evening.    budesonide (PULMICORT) 0.5 mg/2 mL nebulizer solution USE 2 ML(0.5 MG) VIA NEBULIZER TWICE DAILY AS NEEDED    cetirizine (ZYRTEC) 10 MG tablet TAKE 1 TABLET BY MOUTH EVERY DAY    cholecalciferol, vitamin D3, (VITAMIN D3) 50 mcg (2,000 unit) Cap capsule     clobetasol 0.05% (TEMOVATE) 0.05 % Oint     cyanocobalamin 1,000 mcg/mL injection Inject 1 mL (1,000 mcg total) into the muscle every 30 days.    CYMBALTA 30 mg capsule     dexlansoprazole (DEXILANT) 60 mg capsule Take 1 capsule (60 mg total) by mouth before breakfast.    EPINEPHrine (EPIPEN) 0.3 mg/0.3 mL AtIn INJECT 0.3 ML( 0.3 MG TOTAL) IN THE MUSCLE AS NEEDED    famotidine (PEPCID) 40 MG tablet TAKE 1 TABLET(40 MG) BY MOUTH EVERY EVENING    gabapentin (NEURONTIN) 600 MG tablet TAKE 2 TABLETS(1200 MG) BY MOUTH THREE TIMES DAILY    levalbuterol (XOPENEX) 1.25 mg/3 mL nebulizer solution Take 3 mLs (1.25 mg total) by nebulization every 4 (four) hours as needed for Wheezing. Rescue    linaCLOtide (LINZESS) 145 mcg Cap capsule TAKE 1 CAPSULE(145 MCG) BY MOUTH BEFORE BREAKFAST    lisinopriL (PRINIVIL,ZESTRIL) 2.5 MG tablet TAKE 1 TABLET(2.5 MG) BY MOUTH EVERY DAY    metoprolol succinate (TOPROL-XL) 50 MG 24 hr tablet Take 0.5 tablets (25 mg  "total) by mouth every evening.    montelukast (SINGULAIR) 10 mg tablet Take 1 tablet (10 mg total) by mouth every evening.    naloxone (NARCAN) 4 mg/actuation Spry SMARTSIG:Both Nares    nitrofurantoin (MACRODANTIN) 50 MG capsule Take 2 capsules (100 mg total) by mouth every evening. for 280 doses    ondansetron (ZOFRAN) 8 MG tablet Take 1 tablet (8 mg total) by mouth every 8 (eight) hours.    oxyCODONE (ROXICODONE) 10 mg Tab immediate release tablet TAKE 1 TABLET BY MOUTH EVERY 6 HOURS FOR 3 DAYS    pimecrolimus (ELIDEL) 1 % cream Apply topically.    potassium chloride (KLOR-CON) 10 MEQ TbSR TAKE 2 TABLETS(20 MEQ) BY MOUTH EVERY DAY    promethazine (PHENERGAN) 25 MG tablet Take 25 mg by mouth every 8 (eight) hours as needed.    solifenacin (VESICARE) 5 MG tablet Take 1 tablet (5 mg total) by mouth once daily.    BD LUER-KATHE SYRINGE 3 mL 25 x 1 1/2 " Syrg USE TO INJECT B12 INJECTION AS DIRECTED    MOUNJARO 5 mg/0.5 mL PnIj SMARTSI Milligram(s) SUB-Q Once a Week (Patient not taking: Reported on 2024)    sodium chloride 0.9% 0.9 % SolP with bupivacaine 0.5% (5 mg/ml) 0.5 % (5 mg/mL) Soln 0.1 % by Epidural route continuous.    tirzepatide (MOUNJARO) 7.5 mg/0.5 mL PnIj ADMINISTER 7.5 MG UNDER THE SKIN EVERY 7 DAYS    tirzepatide 10 mg/0.5 mL PnIj Inject 10 mg into the skin every 7 days. (Patient not taking: Reported on 2024)       Review of patient's allergies indicates:   Allergen Reactions    Doxycycline Anaphylaxis and Other (See Comments)    Iodinated contrast media Anaphylaxis, Hives and Other (See Comments)    Latex, natural rubber Anaphylaxis, Other (See Comments) and Shortness Of Breath     Also rash and itching      Shellfish containing products Anaphylaxis    Morphine Other (See Comments)     Hallucinations only with oral use    Povidone-iodine Hives        Past Medical History:   Diagnosis Date    Anxiety     Arthritis     Asthma     Breast cyst     Chronic back pain     Colon polyp     " Depression     Diabetes mellitus     Elevated liver enzymes     Fibrocystic breast     Fibromyalgia     GERD (gastroesophageal reflux disease)     History of Chiari malformation     History of colitis     History of gastritis     Hypertension     Hypokalemia     IBS (irritable bowel syndrome)     Insomnia     MVP (mitral valve prolapse)     HERNANDEZ (nonalcoholic steatohepatitis) 2021    Neuropathy     Postmenopausal HRT (hormone replacement therapy)     Seasonal allergies     Sleep apnea with use of continuous positive airway pressure (CPAP)     Spondylitis     Thyroid nodule      Past Surgical History:   Procedure Laterality Date    APPENDECTOMY  10/15/2009  [facility]    BACK SURGERY      BACK SURGERY      xs 4    BACK SURGERY       SECTION      x 3    CHOLECYSTECTOMY      COLONOSCOPY    [facility] [facility]    COLONOSCOPY  08/15/2014    Dr. Colunga: 1 colon polyp removed, hemorrhoids, Mild colonic spasm consistent with irritable bowel syndrome; repeat in 5 years for surveillance; biopsy: TUBULAR ADENOMA.    COLONOSCOPY N/A 10/21/2020    Procedure: COLONOSCOPY;  Surgeon: Earl Colunga Jr., MD; 2 colon polyps removed, hemorrhoids, Mild colonic spasm consistent with irritable bowel syndrome. repeat in 5 years for surveillance; biopsy: polyps- TUBULAR ADENOMA & hyperplastic polyp; random TI & colon biopsies WNL    COLONOSCOPY W/ POLYPECTOMY  10/31/2008  [facility]    2 mm polyp in the sigmoid colon, TUBULAR ADENOMATOUS POLYP.  Erythematous mucosa rectum, proctitis (prep  proctitis?). Irritable bowel syndrome.     ESOPHAGOGASTRODUODENOSCOPY  08/15/2014    Dr. Colunga: Reflux esophagitis, No endoscopic esophageal abnormality to explain patient's dysphagia. Esophagus dilated, 54FR, history/examination was suspicious for an esophageal spasm; gastritis; biopsy: stomach- unremarkable, bishop test negative    ESOPHAGOGASTRODUODENOSCOPY N/A 10/21/2020    Surgeon: Earl Colunga Jr., MD;  Location[facility] ENDO; Reflux esophagitis;  Gastritis. Enlarged gastric folds; No endoscopic esophageal abnormality to explain patient's dysphagia. Esophagus dilated, 51FR; biopsy: duodenum WNL; stomach- REACTIVE GASTRITIS, negative H pylori    ESOPHAGOGASTRODUODENOSCOPY N/A 05/04/2022    Procedure: EGD (ESOPHAGOGASTRODUODENOSCOPY);  Surgeon: Earl Colunga Jr., MD;  Location[facility];  Service: Endoscopy;  Laterality: N/A; Slight antritis, esophageal dilation performed; biopsy: stomach- mild chronic inactive gastritis with focal intestinal metaplasia (complete type), negative for dysplasia; negative for h pylori    GANGLION CYST EXCISION Right     HYSTERECTOMY  10/2007  [facility]    LIVER BIOPSY  10/20/2008    Mild portal mixed leukocytic infiltrate,neutrophils and lymphocytes.  Bile ducts seen, negative for leukocytic infiltration.  Negative for steatohepatitis. Mild sinusoidal dilatation and congestion. Negative for granulomata nor malignancy.  Reticulum stain negative for hepaticfibrosis.  Negative iron stain.  PAS stain, negative for PAS-positive inclusions.    LUMBAR DISCECTOMY      LUMBAR FUSION      MYELOGRAPHY N/A 01/07/2019    Procedure: Myelogram lumbar and thoracic;  Surgeon: Hari Rich MD;  Location: [facility];  Service: Radiology;  Laterality: N/A;    NECK SURGERY      neurostimulator removal       OOPHORECTOMY      pain pump      PELVIC LAPAROSCOPY      SELECTIVE INJECTION OF ANESTHETIC AGENT AROUND LUMBAR SPINAL NERVE ROOT BY TRANSFORAMINAL APPROACH Bilateral 01/30/2019    Procedure: BLOCK, SPINAL NERVE ROOT, LUMBAR, SELECTIVE, TRANSFORAMINAL APPROACH; L3;  Surgeon: Tez Edouard MD;  Location: [facility];  Service: Pain Management;  Laterality: Bilateral;    SPINAL CORD STIMULATOR IMPLANT      UPPER GASTROINTESTINAL ENDOSCOPY  10/31/2008  [facility]    Normal esophagus. Lax LES, non-erosive esophageal reflux (NERD?) Erythematous gastropathy on greater curve.  PRABHU Test performed on 10/31/08 was Negative.      Family History   Problem  "Relation Name Age of Onset    Diabetes Mother      Hypertension Mother      Diabetes Father      Hypertension Father      Colon cancer Neg Hx      Crohn's disease Neg Hx      Ulcerative colitis Neg Hx      Stomach cancer Neg Hx      Esophageal cancer Neg Hx      Cirrhosis Neg Hx       Social History     Tobacco Use    Smoking status: Every Day     Current packs/day: 1.00     Average packs/day: 1 pack/day for 17.0 years (17.0 ttl pk-yrs)     Types: Cigarettes, Vaping with nicotine     Start date: 9/1/2023    Smokeless tobacco: Never   Substance Use Topics    Alcohol use: Not Currently    Drug use: Yes     Types: Oxycodone, Morphine     Comment: medical marijuana          OBJECTIVE:     Vital Signs (Most Recent)  Temp: 98.1 °F (36.7 °C) (07/02/24 0847)  Pulse: 97 (07/02/24 0905)  Resp: 16 (07/02/24 0905)  BP: 111/74 (07/02/24 0905)  SpO2: 95 % (07/02/24 0905)    Physical Exam:  : Ht: 5' 8" (172.7 cm)   Wt: 99.3 kg   BMI: 33.30 kg/m² .                                                       GENERAL:  Comfortable, in no acute distress.                                 HEENT EXAM:  Nonicteric.  No adenopathy.  Oropharynx is clear.               NECK:  Supple.                                                               LUNGS:  Clear.                                                               CARDIAC:  Regular rate and rhythm.  S1, S2.  No murmur.                      ABDOMEN:  Soft, positive bowel sounds, nontender.  No hepatosplenomegaly or masses.  No rebound or guarding.                                             EXTREMITIES:  No edema.     MENTAL STATUS:  Alert and oriented.    ASSESSMENT/PLAN:     Assessment: Dysphagia    Plan: Gastroscopy    Anesthesia Plan:   MAC / General Anaesthesia    ASA Grade: ASA 2 - Patient with mild systemic disease with no functional limitations    MALLAMPATI SCORE: II (hard and soft palate, upper portion of tonsils anduvula visible)    "

## 2024-07-02 ENCOUNTER — ANESTHESIA (OUTPATIENT)
Dept: ENDOSCOPY | Facility: HOSPITAL | Age: 44
End: 2024-07-02
Payer: COMMERCIAL

## 2024-07-02 ENCOUNTER — ANESTHESIA EVENT (OUTPATIENT)
Dept: ENDOSCOPY | Facility: HOSPITAL | Age: 44
End: 2024-07-02
Payer: COMMERCIAL

## 2024-07-02 ENCOUNTER — HOSPITAL ENCOUNTER (OUTPATIENT)
Facility: HOSPITAL | Age: 44
Discharge: HOME | End: 2024-07-02
Attending: INTERNAL MEDICINE | Admitting: NURSE PRACTITIONER
Payer: COMMERCIAL

## 2024-07-02 DIAGNOSIS — R13.10 DYSPHAGIA: ICD-10-CM

## 2024-07-02 DIAGNOSIS — K59.00 SIMPLE CONSTIPATION: ICD-10-CM

## 2024-07-02 LAB — GLUCOSE SERPL-MCNC: 90 MG/DL (ref 70–110)

## 2024-07-02 PROCEDURE — 43248 EGD GUIDE WIRE INSERTION: CPT | Mod: PO | Performed by: INTERNAL MEDICINE

## 2024-07-02 PROCEDURE — 88305 TISSUE EXAM BY PATHOLOGIST: CPT | Mod: PO | Performed by: PATHOLOGY

## 2024-07-02 PROCEDURE — 88342 IMHCHEM/IMCYTCHM 1ST ANTB: CPT | Mod: PO | Performed by: PATHOLOGY

## 2024-07-02 PROCEDURE — 63600175 PHARM REV CODE 636 W HCPCS: Mod: PO | Performed by: NURSE ANESTHETIST, CERTIFIED REGISTERED

## 2024-07-02 PROCEDURE — 43239 EGD BIOPSY SINGLE/MULTIPLE: CPT | Mod: 59,,, | Performed by: INTERNAL MEDICINE

## 2024-07-02 PROCEDURE — 27201012 HC FORCEPS, HOT/COLD, DISP: Mod: PO | Performed by: INTERNAL MEDICINE

## 2024-07-02 PROCEDURE — 37000009 HC ANESTHESIA EA ADD 15 MINS: Mod: PO | Performed by: INTERNAL MEDICINE

## 2024-07-02 PROCEDURE — 25000003 PHARM REV CODE 250: Mod: PO | Performed by: NURSE ANESTHETIST, CERTIFIED REGISTERED

## 2024-07-02 PROCEDURE — 63600175 PHARM REV CODE 636 W HCPCS: Mod: PO | Performed by: INTERNAL MEDICINE

## 2024-07-02 PROCEDURE — 37000008 HC ANESTHESIA 1ST 15 MINUTES: Mod: PO | Performed by: INTERNAL MEDICINE

## 2024-07-02 PROCEDURE — C1769 GUIDE WIRE: HCPCS | Mod: PO | Performed by: INTERNAL MEDICINE

## 2024-07-02 PROCEDURE — 43239 EGD BIOPSY SINGLE/MULTIPLE: CPT | Mod: 59,PO | Performed by: INTERNAL MEDICINE

## 2024-07-02 PROCEDURE — 43248 EGD GUIDE WIRE INSERTION: CPT | Mod: ,,, | Performed by: INTERNAL MEDICINE

## 2024-07-02 RX ORDER — SODIUM CHLORIDE, SODIUM LACTATE, POTASSIUM CHLORIDE, CALCIUM CHLORIDE 600; 310; 30; 20 MG/100ML; MG/100ML; MG/100ML; MG/100ML
INJECTION, SOLUTION INTRAVENOUS CONTINUOUS
Status: DISCONTINUED | OUTPATIENT
Start: 2024-07-02 | End: 2024-07-02 | Stop reason: HOSPADM

## 2024-07-02 RX ORDER — FENTANYL CITRATE 50 UG/ML
INJECTION, SOLUTION INTRAMUSCULAR; INTRAVENOUS
Status: DISCONTINUED | OUTPATIENT
Start: 2024-07-02 | End: 2024-07-02

## 2024-07-02 RX ORDER — PROPOFOL 10 MG/ML
VIAL (ML) INTRAVENOUS
Status: DISCONTINUED | OUTPATIENT
Start: 2024-07-02 | End: 2024-07-02

## 2024-07-02 RX ORDER — LIDOCAINE HYDROCHLORIDE 20 MG/ML
INJECTION INTRAVENOUS
Status: DISCONTINUED | OUTPATIENT
Start: 2024-07-02 | End: 2024-07-02

## 2024-07-02 RX ORDER — SODIUM CHLORIDE 0.9 % (FLUSH) 0.9 %
10 SYRINGE (ML) INJECTION
Status: DISCONTINUED | OUTPATIENT
Start: 2024-07-02 | End: 2024-07-02 | Stop reason: HOSPADM

## 2024-07-02 RX ADMIN — PROPOFOL 50 MG: 10 INJECTION, EMULSION INTRAVENOUS at 09:07

## 2024-07-02 RX ADMIN — FENTANYL CITRATE 50 MCG: 0.05 INJECTION, SOLUTION INTRAMUSCULAR; INTRAVENOUS at 09:07

## 2024-07-02 RX ADMIN — LIDOCAINE HYDROCHLORIDE 160 MG: 20 INJECTION INTRAVENOUS at 09:07

## 2024-07-02 RX ADMIN — SODIUM CHLORIDE, POTASSIUM CHLORIDE, SODIUM LACTATE AND CALCIUM CHLORIDE: 600; 310; 30; 20 INJECTION, SOLUTION INTRAVENOUS at 09:07

## 2024-07-02 RX ADMIN — PROPOFOL 30 MG: 10 INJECTION, EMULSION INTRAVENOUS at 09:07

## 2024-07-02 NOTE — ANESTHESIA PREPROCEDURE EVALUATION
07/02/2024  Miri Earl is a 43 y.o., female.      Pre-op Assessment    I have reviewed the Patient Summary Reports.     I have reviewed the Nursing Notes. I have reviewed the NPO Status.   I have reviewed the Medications.     Review of Systems  Anesthesia Hx:  No problems with previous Anesthesia                Social:  Smoker       Cardiovascular:     Hypertension                                  Hypertension         Pulmonary:    Asthma    Sleep Apnea   Asthma:    Obstructive Sleep Apnea (REUBEN).           Hepatic/GI:     GERD Liver Disease, Hepatitis IBS  HERNANDEZ   Gerd       Liver Disease, Hepatitis        Musculoskeletal:         Spine Disorders: lumbar and thoracic            Neurological:    Neuromuscular Disease,                                 Neuromuscular Disease   Endocrine:  Diabetes    Diabetes                      Psych:  Psychiatric History anxiety depression                Physical Exam  General: Well nourished    Airway:  Mallampati: II   Mouth Opening: Normal  TM Distance: Normal  Neck ROM: Normal ROM        Anesthesia Plan  Type of Anesthesia, risks & benefits discussed:    Anesthesia Type: Gen Natural Airway  Intra-op Monitoring Plan: Standard ASA Monitors  Induction:  IV  Informed Consent: Informed consent signed with the Patient and all parties understand the risks and agree with anesthesia plan.  All questions answered.   ASA Score: 3    Ready For Surgery From Anesthesia Perspective.     .

## 2024-07-02 NOTE — DISCHARGE INSTRUCTIONS
Recovery After Procedural Sedation (Adult)   You have been given medicine by vein to make you sleep during your surgery. This may have included both a pain medicine and sleeping medicine. Most of the effects have worn off. But you may still have some drowsiness for the next 6 to 8 hours.  Home care  Follow these guidelines when you get home:  For the next 8 hours, you should be watched by a responsible adult. This person should make sure your condition is not getting worse.  Don't drink any alcohol for the next 24 hours.  Don't drive, operate dangerous machinery, or make important business or personal decisions during the next 24 hours.  To prevent injury or falls, use caution when standing and walking for at least 24 hours after your procedure.  Note: Your healthcare provider may tell you not to take any medicine by mouth for pain or sleep in the next 4 hours. These medicines may react with the medicines you were given in the hospital. This could cause a much stronger response than usual.  Follow-up care  Follow up with your healthcare provider if you are not alert and back to your usual level of activity within 12 hours.  When to seek medical advice  Call your healthcare provider right away if any of these occur:  Drowsiness gets worse  Weakness or dizziness gets worse  Repeated vomiting  You can't be awakened  Fever  New rash  bookjam last reviewed this educational content on 9/1/2019  © 0869-0926 [employer]. [address] [place] [address], [place] [address]. All rights reserved. This information is not intended as a substitute for professional medical care. Always follow your healthcare professional's instructions         Tips to Control Acid Reflux    To control acid reflux, youll need to make some basic diet and lifestyle changes. The simple steps outlined below may be all youll need to ease discomfort.  Watch what you eat  Avoid fatty foods and spicy foods.  Eat fewer acidic foods, such as citrus  and tomato-based foods. These can increase symptoms.  Limit drinking alcohol, caffeine, and fizzy beverages. All increase acid reflux.  Try limiting chocolate, peppermint, and spearmint. These can worsen acid reflux in some people.  Watch when you eat  Avoid lying down for 3 hours after eating.  Do not snack before going to bed.  Raise your head  Raising your head and upper body by 4 to 6 inches helps limit reflux when youre lying down. Put blocks under the head of your bed frame to raise it.  Other changes  Lose weight, if you need to  Dont exercise near bedtime  Avoid tight-fitting clothes  Limit aspirin and ibuprofen  Stop smoking   Date Last Reviewed: 7/1/2016  © 3969-1426 [employer]. [address] [place] [address], [place] [address]. All rights reserved. This information is not intended as a substitute for professional medical care. Always follow your healthcare professional's instructions.         High-Fiber Diet  Fiber is in fruits, vegetables, cereals, and grains. Fiber passes through your body undigested. A high-fiber diet helps food move through your intestinal tract. The added bulk is helpful in preventing constipation. In people with diverticulosis, fiber helps clean out the pouches along the colon wall. It also prevents new pouches from forming. A high-fiber diet reduces the risk of colon cancer. It also lowers blood cholesterol and prevents high blood sugar in people with diabetes.    The fiber-rich foods listed below should be part of your diet. If you are not used to high-fiber foods, start with 1 or 2 foods from this list. Every 3 to 4 days add a new one to your diet. Do this until you are eating 4 high-fiber foods per day. This should give you 20 to 35 grams of fiber a day. It is also important to drink a lot of water when you are on this diet. You should have 6 to 8 glasses of water a day. Water makes the fiber swell and increases the benefit.  Foods high in dietary fiber  The following  foods are high in dietary fiber:  Breads. Breads made with 100% whole-wheat flour; marielena, wheat, or rye crackers; whole-grain tortillas, bran muffins.  Cereals. Whole-grain and bran cereals with bran (shredded wheat, wheat flakes, raisin bran, corn bran); oatmeal, rolled oats, granola, and brown rice.  Fruits. Fresh fruits and their edible skins (pears, prunes, raisins, berries, apples, and apricots); bananas, citrus fruit, mangoes, pineapple; and prune juice.  Nuts. Any nuts and seeds.  Vegetables. Best served raw or lightly cooked. All types, especially: green peas, celery, eggplant, potatoes, spinach, broccoli, [place] sprouts, winter squash, carrots, cauliflower, soybeans, lentils, and fresh and dried beans of all kinds.  Other. Popcorn, any spices.  Date Last Reviewed: 8/1/2016  © 2778-1407 [employer]. [address] [place] [address]. All rights reserved. This information is not intended as a substitute for professional medical care. Always follow your healthcare professional's instructions.

## 2024-07-02 NOTE — ANESTHESIA POSTPROCEDURE EVALUATION
Anesthesia Post Evaluation    Patient: April D Earl    Procedure(s) Performed: Procedure(s) (LRB):  EGD (ESOPHAGOGASTRODUODENOSCOPY) (N/A)    Final Anesthesia Type: general      Patient location during evaluation: PACU  Patient participation: Yes- Able to Participate  Level of consciousness: awake and alert  Post-procedure vital signs: reviewed and stable  Pain management: adequate  Airway patency: patent    PONV status at discharge: No PONV  Anesthetic complications: no      Cardiovascular status: blood pressure returned to baseline  Respiratory status: unassisted  Hydration status: euvolemic  Follow-up not needed.              Vitals Value Taken Time   /73 07/02/24 1023   Temp  07/02/24 1529   Pulse 90 07/02/24 1023   Resp 16 07/02/24 1023   SpO2 98 % 07/02/24 1023         Event Time   Out of Recovery 10:38:18         Pain/Martha Score: Pain Rating Post Med Admin: 0 (7/2/2024 10:22 AM)  Martha Score: 10 (7/2/2024 10:22 AM)

## 2024-07-02 NOTE — BRIEF OP NOTE
Discharge Note  Short Stay      SUMMARY     Admit Date: 7/2/2024    Attending Physician: Earl Colunga Jr., MD     Discharge Physician: Earl Colunga Jr., MD    Discharge Date: 7/2/2024 10:19 AM    Final Diagnosis: Dysphagia, unspecified type [R13.10]    Impression:            - Normal oropharynx.                          - Normal larynx.                          - Normal cricopharyngeus.                          - Normal esophagus. Biopsied.                          - Z-line regular, 39 cm from the incisors.                          - No endoscopic esophageal abnormality to explain                          patient's dysphagia. Esophagus dilated, 54 Fr.                          - Normal cardia.                          - Fundal Gastritis. Biopsied.                          - Minimal pre-pyloric antritis. Biopsied.                          - Normal stomach otherwise.                          - Normal pylorus.                          - A single duodenal polyp. Resected and retrieved.                          - Normal examined duodenum.                          - Normal major papilla.   Recommendation:        - Discharge patient to home.                          - Observe patient's clinical course following                          today's procedure with therapeutic intervention.                          - Await pathology results.                          - Continue present medications.                          - Use Dexilant (dexlansoprazole) 60 mg PO daily.                          - Use Pepcid (famotidine) 40 mg PO daily.                          - Call the [facility] clinic in 2 weeks for reports (if                          you haven't heard from us sooner) [phone].                          - Repeat upper endoscopy PRN for retreatment.   Earl Colunga MD   7/2/2024       Disposition: HOME OR SELF CARE    Patient Instructions:   Current Discharge Medication List        CONTINUE these medications which  have CHANGED    Details   linaCLOtide (LINZESS) 290 mcg Cap capsule Take 1 capsule (290 mcg total) by mouth before breakfast.  Qty: 30 capsule, Refills: 11    Associated Diagnoses: Simple constipation           CONTINUE these medications which have NOT CHANGED    Details   albuterol (PROVENTIL/VENTOLIN HFA) 90 mcg/actuation inhaler INHALE 2 PUFFS BY MOUTH EVERY 6 HOURS AS NEEDED FOR WHEEZING  Qty: 8.5 g, Refills: 1      atorvastatin (LIPITOR) 20 MG tablet Take 1 tablet (20 mg total) by mouth every evening.  Qty: 90 tablet, Refills: 1    Associated Diagnoses: Mixed hyperlipidemia      budesonide (PULMICORT) 0.5 mg/2 mL nebulizer solution USE 2 ML(0.5 MG) VIA NEBULIZER TWICE DAILY AS NEEDED  Qty: 60 mL, Refills: 1    Associated Diagnoses: Mild intermittent asthma without complication      cetirizine (ZYRTEC) 10 MG tablet TAKE 1 TABLET BY MOUTH EVERY DAY  Qty: 90 tablet, Refills: 3    Associated Diagnoses: Chronic allergic rhinitis      cholecalciferol, vitamin D3, (VITAMIN D3) 50 mcg (2,000 unit) Cap capsule       clobetasol 0.05% (TEMOVATE) 0.05 % Oint       cyanocobalamin 1,000 mcg/mL injection Inject 1 mL (1,000 mcg total) into the muscle every 30 days.  Qty: 10 mL, Refills: 1    Comments: Please provide 100 needle with syringes  Associated Diagnoses: Anemia due to vitamin B12 deficiency, unspecified B12 deficiency type      CYMBALTA 30 mg capsule       dexlansoprazole (DEXILANT) 60 mg capsule Take 1 capsule (60 mg total) by mouth before breakfast.  Qty: 90 capsule, Refills: 3    Associated Diagnoses: Heartburn      EPINEPHrine (EPIPEN) 0.3 mg/0.3 mL AtIn INJECT 0.3 ML( 0.3 MG TOTAL) IN THE MUSCLE AS NEEDED  Qty: 2 each, Refills: 2      famotidine (PEPCID) 40 MG tablet TAKE 1 TABLET(40 MG) BY MOUTH EVERY EVENING  Qty: 90 tablet, Refills: 3    Associated Diagnoses: Gastroesophageal reflux disease, unspecified whether esophagitis present      gabapentin (NEURONTIN) 600 MG tablet TAKE 2 TABLETS(1200 MG) BY MOUTH THREE  "TIMES DAILY  Qty: 540 tablet, Refills: 1      levalbuterol (XOPENEX) 1.25 mg/3 mL nebulizer solution Take 3 mLs (1.25 mg total) by nebulization every 4 (four) hours as needed for Wheezing. Rescue  Qty: 30 mL, Refills: 1      lisinopriL (PRINIVIL,ZESTRIL) 2.5 MG tablet TAKE 1 TABLET(2.5 MG) BY MOUTH EVERY DAY  Qty: 90 tablet, Refills: 3    Comments: .  Associated Diagnoses: Essential hypertension; Type 2 diabetes mellitus without complication, without long-term current use of insulin      metoprolol succinate (TOPROL-XL) 50 MG 24 hr tablet Take 0.5 tablets (25 mg total) by mouth every evening.    Comments: Dose change for low BP  Associated Diagnoses: Essential hypertension      montelukast (SINGULAIR) 10 mg tablet Take 1 tablet (10 mg total) by mouth every evening.  Qty: 90 tablet, Refills: 3    Associated Diagnoses: Mild intermittent asthma without complication      naloxone (NARCAN) 4 mg/actuation Spry SMARTSIG:Both Nares      nitrofurantoin (MACRODANTIN) 50 MG capsule Take 2 capsules (100 mg total) by mouth every evening. for 280 doses  Qty: 60 capsule, Refills: 6    Associated Diagnoses: Prophylactic antibiotic      ondansetron (ZOFRAN) 8 MG tablet Take 1 tablet (8 mg total) by mouth every 8 (eight) hours.  Qty: 6 tablet, Refills: 1    Associated Diagnoses: Colitis; Irritable bowel syndrome, unspecified type      oxyCODONE (ROXICODONE) 10 mg Tab immediate release tablet TAKE 1 TABLET BY MOUTH EVERY 6 HOURS FOR 3 DAYS      pimecrolimus (ELIDEL) 1 % cream Apply topically.      potassium chloride (KLOR-CON) 10 MEQ TbSR TAKE 2 TABLETS(20 MEQ) BY MOUTH EVERY DAY  Qty: 180 tablet, Refills: 3      promethazine (PHENERGAN) 25 MG tablet Take 25 mg by mouth every 8 (eight) hours as needed.      solifenacin (VESICARE) 5 MG tablet Take 1 tablet (5 mg total) by mouth once daily.  Qty: 30 tablet, Refills: 11    Associated Diagnoses: OAB (overactive bladder); Urinary urgency      BD LUER-KATHE SYRINGE 3 mL 25 x 1 1/2 " Syrg USE " TO INJECT B12 INJECTION AS DIRECTED      MOUNJARO 5 mg/0.5 mL PnIj SMARTSI Milligram(s) SUB-Q Once a Week      sodium chloride 0.9% 0.9 % SolP with bupivacaine 0.5% (5 mg/ml) 0.5 % (5 mg/mL) Soln 0.1 % by Epidural route continuous.      tirzepatide (MOUNJARO) 7.5 mg/0.5 mL PnIj ADMINISTER 7.5 MG UNDER THE SKIN EVERY 7 DAYS  Qty: 2 mL, Refills: 6      tirzepatide 10 mg/0.5 mL PnIj Inject 10 mg into the skin every 7 days.  Qty: 4 Pen, Refills: 3             Discharge Procedure Orders (must include Diet, Follow-up, Activity)    Follow Up:  Follow up with PCP as per your routine.  Please follow an anti reflux diet and a high fiber diet.  Activity as tolerated.    No driving day of procedure.

## 2024-07-02 NOTE — TRANSFER OF CARE
"Anesthesia Transfer of Care Note    Patient: April D Rajat    Procedure(s) Performed: Procedure(s) (LRB):  EGD (ESOPHAGOGASTRODUODENOSCOPY) (N/A)    Patient location: PACU    Anesthesia Type: general    Transport from OR: Transported from OR on room air with adequate spontaneous ventilation    Post pain: adequate analgesia    Post assessment: no apparent anesthetic complications and tolerated procedure well    Post vital signs: stable    Level of consciousness: responds to stimulation    Nausea/Vomiting: no nausea/vomiting    Complications: none    Transfer of care protocol was followed    Last vitals: Visit Vitals  /62   Pulse 97   Temp 36.7 °C (98.1 °F) (Skin)   Resp 16   Ht 5' 8" (1.727 m)   Wt 99.3 kg (219 lb)   SpO2 95%   Breastfeeding No   BMI 33.30 kg/m²     "

## 2024-07-02 NOTE — PROVATION PATIENT INSTRUCTIONS
Discharge Summary/Instructions after an Endoscopic Procedure  Patient Name: Miri Earl  Patient MRN: [id number]  Patient YOB: 1980 Tuesday, July 2, 2024  Earl Colunga MD  Dear patient,  As a result of recent federal legislation (The Federal Cures Act), you may   receive lab or pathology results from your procedure in your [facility]   account before your physician is able to contact you. Your physician or   their representative will relay the results to you with their   recommendations at their soonest availability.  Thank you,  RESTRICTIONS:  During your procedure today, you received medications for sedation.  These   medications may affect your judgment, balance and coordination.  Therefore,   for 24 hours, you have the following restrictions:   - DO NOT drive a car, operate machinery, make legal/financial decisions,   sign important papers or drink alcohol.    ACTIVITY:  Today: no heavy lifting, straining or running due to procedural   sedation/anesthesia.  The following day: return to full activity including work.  DIET:  Eat and drink normally unless instructed otherwise.     TREATMENT FOR COMMON SIDE EFFECTS:  - Mild abdominal pain, nausea, belching, bloating or excessive gas:  rest,   eat lightly and use a heating pad.  - Sore Throat: treat with throat lozenges and/or gargle with warm salt   water.  - Because air was used during the procedure, expelling large amounts of air   from your rectum or belching is normal.  - If a bowel prep was taken, you may not have a bowel movement for 1-3 days.    This is normal.  SYMPTOMS TO WATCH FOR AND REPORT TO YOUR PHYSICIAN:  1. Abdominal pain or bloating, other than gas cramps.  2. Chest pain.  3. Back pain.  4. Signs of infection such as: chills or fever occurring within 24 hours   after the procedure.  5. Rectal bleeding, which would show as bright red, maroon, or black stools.   (A tablespoon of blood from the rectum is not serious, especially  if   hemorrhoids are present.)  6. Vomiting.  7. Weakness or dizziness.  GO DIRECTLY TO THE NEAREST EMERGENCY ROOM IF YOU HAVE ANY OF THE FOLLOWING:      Difficulty breathing              Chills and/or fever over 101 F   Persistent vomiting and/or vomiting blood   Severe abdominal pain   Severe chest pain   Black, tarry stools   Bleeding- more than one tablespoon   Any other symptom or condition that you feel may need urgent attention  Your doctor recommends these additional instructions:  If any biopsies were taken, your doctors clinic will contact you in 1 to 2   weeks with any results.  You are being discharged to home.   Your clinical course following today's procedure with therapeutic   intervention will be observed.   We are waiting for your pathology results.   Continue your present medications.   Take Dexilant (dexlansoprazole) 60 mg by mouth once a day.   Take Pepcid (famotidine) 40 mg by mouth once a day.   Your physician has recommended a repeat upper endoscopy as needed for   retreatment.  For questions, problems or results please call your physician - Earl Colunga MD at Work:  [phone].  EMERGENCY PHONE NUMBER: [phone], LAB RESULTS: [phone]  IF A COMPLICATION OR EMERGENCY SITUATION ARISES AND YOU ARE UNABLE TO REACH   YOUR PHYSICIAN - GO DIRECTLY TO THE EMERGENCY ROOM.  ___________________________________________  Nurse Signature  ___________________________________________  Patient/Designated Responsible Party Signature  Earl Colunga MD  7/2/2024 10:17:24 AM  This report has been verified and signed electronically.  Dear patient,  As a result of recent federal legislation (The Federal Cures Act), you may   receive lab or pathology results from your procedure in your [facility]   account before your physician is able to contact you. Your physician or   their representative will relay the results to you with their   recommendations at their soonest availability.  Thank  you.  PROVATION

## 2024-07-03 VITALS
DIASTOLIC BLOOD PRESSURE: 73 MMHG | HEART RATE: 90 BPM | OXYGEN SATURATION: 98 % | TEMPERATURE: 98 F | RESPIRATION RATE: 16 BRPM | WEIGHT: 219 LBS | HEIGHT: 68 IN | SYSTOLIC BLOOD PRESSURE: 101 MMHG | BODY MASS INDEX: 33.19 KG/M2

## 2024-07-07 DIAGNOSIS — J30.9 CHRONIC ALLERGIC RHINITIS: ICD-10-CM

## 2024-07-08 LAB
FINAL PATHOLOGIC DIAGNOSIS: NORMAL
GROSS: NORMAL
Lab: NORMAL

## 2024-07-09 RX ORDER — CETIRIZINE HYDROCHLORIDE 10 MG/1
10 TABLET ORAL DAILY
Qty: 90 TABLET | Refills: 3 | Status: SHIPPED | OUTPATIENT
Start: 2024-07-09

## 2024-07-23 ENCOUNTER — PATIENT MESSAGE (OUTPATIENT)
Dept: GASTROENTEROLOGY | Facility: CLINIC | Age: 44
End: 2024-07-23
Payer: COMMERCIAL

## 2024-07-26 ENCOUNTER — LAB VISIT (OUTPATIENT)
Dept: LAB | Facility: HOSPITAL | Age: 44
End: 2024-07-26
Attending: NURSE PRACTITIONER
Payer: COMMERCIAL

## 2024-07-26 DIAGNOSIS — E11.9 TYPE 2 DIABETES MELLITUS WITHOUT COMPLICATION, WITHOUT LONG-TERM CURRENT USE OF INSULIN: ICD-10-CM

## 2024-07-26 LAB
CHOLEST SERPL-MCNC: 127 MG/DL (ref 120–199)
CHOLEST/HDLC SERPL: 3.2 {RATIO} (ref 2–5)
ESTIMATED AVG GLUCOSE: 108 MG/DL (ref 68–131)
HBA1C MFR BLD: 5.4 % (ref 4–5.6)
HDLC SERPL-MCNC: 40 MG/DL (ref 40–75)
HDLC SERPL: 31.5 % (ref 20–50)
LDLC SERPL CALC-MCNC: 71 MG/DL (ref 63–159)
NONHDLC SERPL-MCNC: 87 MG/DL
TRIGL SERPL-MCNC: 80 MG/DL (ref 30–150)

## 2024-07-26 PROCEDURE — 80061 LIPID PANEL: CPT | Performed by: NURSE PRACTITIONER

## 2024-07-26 PROCEDURE — 36415 COLL VENOUS BLD VENIPUNCTURE: CPT | Mod: PO | Performed by: NURSE PRACTITIONER

## 2024-07-26 PROCEDURE — 83036 HEMOGLOBIN GLYCOSYLATED A1C: CPT | Performed by: NURSE PRACTITIONER

## 2024-08-05 ENCOUNTER — TELEPHONE (OUTPATIENT)
Dept: GASTROENTEROLOGY | Facility: CLINIC | Age: 44
End: 2024-08-05
Payer: COMMERCIAL

## 2024-08-07 ENCOUNTER — OFFICE VISIT (OUTPATIENT)
Dept: FAMILY MEDICINE | Facility: CLINIC | Age: 44
End: 2024-08-07
Payer: COMMERCIAL

## 2024-08-07 VITALS
RESPIRATION RATE: 16 BRPM | OXYGEN SATURATION: 98 % | SYSTOLIC BLOOD PRESSURE: 98 MMHG | WEIGHT: 212.44 LBS | HEART RATE: 93 BPM | HEIGHT: 68 IN | DIASTOLIC BLOOD PRESSURE: 74 MMHG | BODY MASS INDEX: 32.2 KG/M2 | TEMPERATURE: 98 F

## 2024-08-07 DIAGNOSIS — I10 ESSENTIAL HYPERTENSION: ICD-10-CM

## 2024-08-07 DIAGNOSIS — E11.9 TYPE 2 DIABETES MELLITUS WITHOUT COMPLICATION, WITHOUT LONG-TERM CURRENT USE OF INSULIN: Primary | ICD-10-CM

## 2024-08-07 PROCEDURE — 4010F ACE/ARB THERAPY RXD/TAKEN: CPT | Mod: CPTII,,, | Performed by: NURSE PRACTITIONER

## 2024-08-07 PROCEDURE — 1159F MED LIST DOCD IN RCRD: CPT | Mod: CPTII,,, | Performed by: NURSE PRACTITIONER

## 2024-08-07 PROCEDURE — 99214 OFFICE O/P EST MOD 30 MIN: CPT | Mod: ,,, | Performed by: NURSE PRACTITIONER

## 2024-08-07 PROCEDURE — 3074F SYST BP LT 130 MM HG: CPT | Mod: CPTII,,, | Performed by: NURSE PRACTITIONER

## 2024-08-07 PROCEDURE — 3044F HG A1C LEVEL LT 7.0%: CPT | Mod: CPTII,,, | Performed by: NURSE PRACTITIONER

## 2024-08-07 PROCEDURE — 3078F DIAST BP <80 MM HG: CPT | Mod: CPTII,,, | Performed by: NURSE PRACTITIONER

## 2024-08-07 PROCEDURE — 3060F POS MICROALBUMINURIA REV: CPT | Mod: CPTII,,, | Performed by: NURSE PRACTITIONER

## 2024-08-07 PROCEDURE — 3066F NEPHROPATHY DOC TX: CPT | Mod: CPTII,,, | Performed by: NURSE PRACTITIONER

## 2024-08-07 PROCEDURE — 3008F BODY MASS INDEX DOCD: CPT | Mod: CPTII,,, | Performed by: NURSE PRACTITIONER

## 2024-08-07 RX ORDER — DULOXETIN HYDROCHLORIDE 60 MG/1
60 CAPSULE, DELAYED RELEASE ORAL
COMMUNITY

## 2024-08-07 RX ORDER — METOPROLOL SUCCINATE 25 MG/1
12.5 TABLET, EXTENDED RELEASE ORAL DAILY
Qty: 45 TABLET | Refills: 3 | Status: SHIPPED | OUTPATIENT
Start: 2024-08-07 | End: 2025-08-07

## 2024-08-07 RX ORDER — PYRIDOXINE HCL (VITAMIN B6) 100 MG
TABLET ORAL
COMMUNITY
Start: 2024-06-01

## 2024-08-07 RX ORDER — METOPROLOL SUCCINATE 25 MG/1
25 TABLET, EXTENDED RELEASE ORAL DAILY
Qty: 90 TABLET | Refills: 3 | Status: SHIPPED | OUTPATIENT
Start: 2024-08-07 | End: 2024-08-07

## 2024-08-07 NOTE — PROGRESS NOTES
"Subjective:       Patient ID: April D Rajat is a 44 y.o. female.    Chief Complaint: Follow-up (3 month )  Pt is here for a 3 month f/u . Pt with DM  Lab Results   Component Value Date    HGBA1C 5.4 07/26/2024       Pt lost 9 lbs in last 3 months   Currently on mounjaro 7.5 mg weekly    HPI  Review of Systems   Constitutional:  Negative for activity change and appetite change.   HENT:  Positive for sore throat. Negative for congestion, postnasal drip, rhinorrhea and sinus pressure.    Eyes:  Negative for pain and redness.   Respiratory:  Negative for choking and chest tightness.    Gastrointestinal:  Negative for abdominal distention, abdominal pain, blood in stool, constipation, diarrhea, nausea and vomiting.   Endocrine: Negative for polydipsia and polyphagia.   Genitourinary:  Negative for dysuria and hematuria.   Musculoskeletal:  Negative for arthralgias and myalgias.   Skin:  Negative for color change and rash.   Neurological:  Negative for dizziness and headaches.   Psychiatric/Behavioral:  Negative for agitation and behavioral problems.        Past medical, surgical, family and social history reviewed.  Objective:     Vitals:    08/07/24 1450   BP: 98/74   Pulse: 93   Resp: 16   Temp: 98 °F (36.7 °C)   SpO2: 98%   Weight: 96.3 kg (212 lb 6.6 oz)   Height: 5' 8" (1.727 m)   PainSc:   4   PainLoc: Back     Body mass index is 32.3 kg/m².     Physical Exam  Constitutional:       Appearance: She is well-developed.   HENT:      Head: Normocephalic and atraumatic.      Right Ear: External ear normal.      Left Ear: External ear normal.      Nose: Nose normal.      Mouth/Throat:      Pharynx: Posterior oropharyngeal erythema present.   Eyes:      General: No scleral icterus.        Right eye: No discharge.         Left eye: No discharge.      Conjunctiva/sclera: Conjunctivae normal.   Neck:      Trachea: No tracheal deviation.   Cardiovascular:      Rate and Rhythm: Normal rate and regular rhythm.      Heart sounds: " Normal heart sounds. No murmur heard.     No friction rub.   Pulmonary:      Effort: Pulmonary effort is normal. No respiratory distress.      Breath sounds: Normal breath sounds. No stridor. No wheezing or rales.   Chest:      Chest wall: No tenderness.   Musculoskeletal:         General: Normal range of motion.      Cervical back: Normal range of motion and neck supple.   Lymphadenopathy:      Cervical: No cervical adenopathy.   Skin:     General: Skin is warm and dry.   Neurological:      Mental Status: She is alert and oriented to person, place, and time.       Assessment:       1. Type 2 diabetes mellitus without complication, without long-term current use of insulin        Plan:       April was seen today for follow-up.    Diagnoses and all orders for this visit:    Type 2 diabetes mellitus without complication, without long-term current use of insulin  Controlled  Continue mounjaro    Essential hypertension    BP running low  Decrease toprol  -     metoprolol succinate (TOPROL-XL) 25 MG 24 hr tablet; Take 0.5 tablets (12.5 mg total) by mouth once daily.    I spent 30 minutes on this encounter, time includes face-to-face, chart review, documentation, test review and orders.

## 2024-08-08 RX ORDER — ALBUTEROL SULFATE 90 UG/1
INHALANT RESPIRATORY (INHALATION)
Qty: 8.5 G | Refills: 3 | Status: SHIPPED | OUTPATIENT
Start: 2024-08-08

## 2024-09-18 ENCOUNTER — HOSPITAL ENCOUNTER (OUTPATIENT)
Dept: RADIOLOGY | Facility: HOSPITAL | Age: 44
Discharge: HOME | End: 2024-09-18
Attending: INTERNAL MEDICINE
Payer: COMMERCIAL

## 2024-09-18 DIAGNOSIS — R94.6 ABNORMAL THYROID FUNCTION TEST: ICD-10-CM

## 2024-09-18 DIAGNOSIS — E04.2 MULTINODULAR GOITER: ICD-10-CM

## 2024-09-18 PROCEDURE — 76536 US EXAM OF HEAD AND NECK: CPT | Mod: 26,,, | Performed by: RADIOLOGY

## 2024-09-18 PROCEDURE — 76536 US EXAM OF HEAD AND NECK: CPT | Mod: TC,PO

## 2024-09-27 ENCOUNTER — OFFICE VISIT (OUTPATIENT)
Dept: ENDOCRINOLOGY | Facility: CLINIC | Age: 44
End: 2024-09-27
Payer: COMMERCIAL

## 2024-09-27 DIAGNOSIS — E05.90 SUBCLINICAL HYPERTHYROIDISM: Primary | ICD-10-CM

## 2024-09-27 DIAGNOSIS — E04.2 MULTINODULAR GOITER: ICD-10-CM

## 2024-09-27 NOTE — PROGRESS NOTES
The patient location is: [place]    Visit type: audiovisual    Face to Face time with patient: 8  11 minutes of total time spent on the encounter, which includes face to face time and non-face to face time preparing to see the patient (eg, review of tests), Obtaining and/or reviewing separately obtained history, Documenting clinical information in the electronic or other health record, Independently interpreting results (not separately reported) and communicating results to the patient/family/caregiver, or Care coordination (not separately reported).         Each patient to whom he or she provides medical services by telemedicine is:  (1) informed of the relationship between the physician and patient and the respective role of any other health care provider with respect to management of the patient; and (2) notified that he or she may decline to receive medical services by telemedicine and may withdraw from such care at any time.    Notes:     CHIEF COMPLAINT: Multinodular Goiter   44 y.o. old being seen as a f/u. Saw PA in [place]. In 2019 was found to have thyroid nodule. No XRT to head/neck. No History of FNA. Has had palpitations which is chronic. No diarrhea. Has chronic constipation- on linzess. No difficulty swallowing. No biotin. Supplements- B6, B 12, Vit D.       PAST MEDICAL HISTORY/PAST SURGICAL HISTORY:  Reviewed in [facility]    SOCIAL HISTORY: Reviewed in [facility]    FAMILY HISTORY:  Mother and daughters with Hypothyroidism.     MEDICATIONS/ALLERGIES: The patient's MedCard has been updated and reviewed.              PE:        LABS/Radiology   Latest Reference Range & Units 09/18/24 09:30   TSH 0.400 - 4.000 uIU/mL 0.208 (L)   Free T4 0.71 - 1.51 ng/dL 1.00   (L): Data is abnormally low  US SOFT TISSUE HEAD NECK     CLINICAL HISTORY:  Nontoxic multinodular goiter     TECHNIQUE:  Ultrasound of the thyroid and cervical lymph nodes was performed.     COMPARISON:  Thyroid ultrasound-09/12/2023     FINDINGS:  The right  thyroid lobe measures 5.4 x 2.0 x 2.1 cm.  The left thyroid lobe measures 5.5 x 1.9 x 2.0 cm.  The total thyroid weight is 22.7 g.  There is a diffusely heterogeneous thyroid parenchymal echotexture present.  There is a 7 x 7 x 8 mm ill-defined, wider than tall, isoechoic primarily solid nodule observed posteriorly in the right thyroid midpole, stable when compared to the previous study.  There is a 12 x 9 x 11 mm smooth, circumscribed, wider than tall, hypoechoic primarily solid nodule in the lower pole of the right thyroid lobe (TI-RADS 4), similar when compared to the prior study.  There is an 11 x 11 x 8 mm unchanged smooth, circumscribed, wider than tall, hypoechoic solid nodule present posteriorly in the left thyroid midpole (TI-RADS 4)..  There is a 6 x 5 x 5 mm unchanged smooth, circumscribed, wider than tall, slightly hypoechoic solid nodule observed in the upper pole of the left thyroid lobe (TI-RADS 4).  There are a few smaller solid nodules in the lower pole of the left thyroid lobe which measure up to 5 mm.  No new thyroid nodules which meet the criteria for FNA/core biopsy.     No pathologically enlarged or morphologically abnormal lymph nodes in the left or right neck adjacent to the thyroid fossa.     Impression:     Multinodular goiter.  No interval detrimental change when compared to the prior study.  No new thyroid nodules which meet the criteria for FNA/core biopsy.  There is a 12 mm TI-RADS 4 nodule observed in the lower pole of the right thyroid lobe and an 11 mm TI-RADS 4 nodule observed in the left thyroid midpole.  Consider a follow-up study in 12 months to ensure stability.      ASSESSMENT/PLAN:  1. Multinodular Goiter- no XRT.  Ultrasound shows no significant change from before.  No significant obstructive symptoms after she had EGD.  Can repeat ultrasound 1 year.    2. Abnormal TFTs-suppressed TSH.  TSH does tend to fluctuate.  She does have chronic palpitations.  Unsure if related to  thyroid, since has the same palpitations when her thyroid levels are normal.  Repeat TSH in 3 months    FOLLOWUP  3 months TSH  F/U 1 yr with TSH and Thyroid Ultrasound.

## 2024-10-07 ENCOUNTER — TELEPHONE (OUTPATIENT)
Dept: FAMILY MEDICINE | Facility: CLINIC | Age: 44
End: 2024-10-07
Payer: COMMERCIAL

## 2024-11-08 RX ORDER — ALBUTEROL SULFATE 90 UG/1
INHALANT RESPIRATORY (INHALATION)
Qty: 8.5 G | Refills: 3 | Status: SHIPPED | OUTPATIENT
Start: 2024-11-08

## 2024-11-12 DIAGNOSIS — Z01.419 WELL WOMAN EXAM: Primary | ICD-10-CM

## 2024-11-14 ENCOUNTER — OFFICE VISIT (OUTPATIENT)
Dept: OBSTETRICS AND GYNECOLOGY | Facility: CLINIC | Age: 44
End: 2024-11-14
Payer: COMMERCIAL

## 2024-11-14 VITALS
HEIGHT: 68 IN | BODY MASS INDEX: 30.37 KG/M2 | SYSTOLIC BLOOD PRESSURE: 112 MMHG | WEIGHT: 200.38 LBS | DIASTOLIC BLOOD PRESSURE: 74 MMHG

## 2024-11-14 DIAGNOSIS — N94.10 DYSPAREUNIA IN FEMALE: Primary | ICD-10-CM

## 2024-11-14 DIAGNOSIS — N95.2 VAGINAL ATROPHY: ICD-10-CM

## 2024-11-14 DIAGNOSIS — Z90.721 S/P HYSTERECTOMY WITH OOPHORECTOMY: ICD-10-CM

## 2024-11-14 DIAGNOSIS — G62.9 NEUROPATHY: ICD-10-CM

## 2024-11-14 DIAGNOSIS — E11.9 TYPE 2 DIABETES MELLITUS WITHOUT COMPLICATION, WITHOUT LONG-TERM CURRENT USE OF INSULIN: ICD-10-CM

## 2024-11-14 DIAGNOSIS — Z79.890 HORMONE REPLACEMENT THERAPY (HRT): ICD-10-CM

## 2024-11-14 DIAGNOSIS — K75.81 NASH (NONALCOHOLIC STEATOHEPATITIS): ICD-10-CM

## 2024-11-14 DIAGNOSIS — F52.0 HYPOACTIVE SEXUAL DESIRE DISORDER: ICD-10-CM

## 2024-11-14 DIAGNOSIS — Z90.710 S/P HYSTERECTOMY WITH OOPHORECTOMY: ICD-10-CM

## 2024-11-14 PROCEDURE — 4010F ACE/ARB THERAPY RXD/TAKEN: CPT | Mod: CPTII,S$GLB,, | Performed by: OBSTETRICS & GYNECOLOGY

## 2024-11-14 PROCEDURE — 3060F POS MICROALBUMINURIA REV: CPT | Mod: CPTII,S$GLB,, | Performed by: OBSTETRICS & GYNECOLOGY

## 2024-11-14 PROCEDURE — 3066F NEPHROPATHY DOC TX: CPT | Mod: CPTII,S$GLB,, | Performed by: OBSTETRICS & GYNECOLOGY

## 2024-11-14 PROCEDURE — 1159F MED LIST DOCD IN RCRD: CPT | Mod: CPTII,S$GLB,, | Performed by: OBSTETRICS & GYNECOLOGY

## 2024-11-14 PROCEDURE — 3044F HG A1C LEVEL LT 7.0%: CPT | Mod: CPTII,S$GLB,, | Performed by: OBSTETRICS & GYNECOLOGY

## 2024-11-14 PROCEDURE — 99203 OFFICE O/P NEW LOW 30 MIN: CPT | Mod: S$GLB,,, | Performed by: OBSTETRICS & GYNECOLOGY

## 2024-11-14 PROCEDURE — 3008F BODY MASS INDEX DOCD: CPT | Mod: CPTII,S$GLB,, | Performed by: OBSTETRICS & GYNECOLOGY

## 2024-11-14 PROCEDURE — 99999 PR PBB SHADOW E&M-EST. PATIENT-LVL III: CPT | Mod: PBBFAC,,, | Performed by: OBSTETRICS & GYNECOLOGY

## 2024-11-14 PROCEDURE — 3078F DIAST BP <80 MM HG: CPT | Mod: CPTII,S$GLB,, | Performed by: OBSTETRICS & GYNECOLOGY

## 2024-11-14 PROCEDURE — 3074F SYST BP LT 130 MM HG: CPT | Mod: CPTII,S$GLB,, | Performed by: OBSTETRICS & GYNECOLOGY

## 2024-11-14 RX ORDER — ESTRADIOL 0.05 MG/D
1 FILM, EXTENDED RELEASE TRANSDERMAL
Qty: 24 PATCH | Refills: 3 | Status: SHIPPED | OUTPATIENT
Start: 2024-11-14 | End: 2025-11-14

## 2024-11-14 NOTE — PROGRESS NOTES
Chief Complaint   Patient presents with    \[facility] [place] [facility]\"" Care    discuss hormones       History of Present Illness: Miri Earl is a 44 y.o. female that presents today 2024 for   Chief Complaint   Patient presents with    Establish Care    discuss hormones     She reports low libido.  She reports mild hot flashes.  She reports sex is not comfortable and painful at times.     Past Medical History:   Diagnosis Date    Anxiety     Arthritis     Asthma     Breast cyst     Chronic back pain     Colon polyp     Depression     Diabetes mellitus     Elevated liver enzymes     Fibrocystic breast     Fibromyalgia     GERD (gastroesophageal reflux disease)     History of Chiari malformation     History of colitis     History of gastritis     Hypertension     Hypokalemia     IBS (irritable bowel syndrome)     Insomnia     MVP (mitral valve prolapse)     HERNANDEZ (nonalcoholic steatohepatitis) 2021    Neuropathy     Postmenopausal HRT (hormone replacement therapy)     Seasonal allergies     Sleep apnea with use of continuous positive airway pressure (CPAP)     Spondylitis     Thyroid nodule        Past Surgical History:   Procedure Laterality Date    APPENDECTOMY  10/15/2009  [facility]    BACK SURGERY      BACK SURGERY      xs 4    BACK SURGERY       SECTION      x 3    CHOLECYSTECTOMY      COLONOSCOPY    [facility]    COLONOSCOPY  08/15/2014    Dr. Colunga: 1 colon polyp removed, hemorrhoids, Mild colonic spasm consistent with irritable bowel syndrome; repeat in 5 years for surveillance; biopsy: TUBULAR ADENOMA.    COLONOSCOPY N/A 10/21/2020    Procedure: COLONOSCOPY;  Surgeon: Earl Colunga Jr., MD; 2 colon polyps removed, hemorrhoids, Mild colonic spasm consistent with irritable bowel syndrome. repeat in 5 years for surveillance; biopsy: polyps- TUBULAR ADENOMA & hyperplastic polyp; random TI & colon biopsies WNL    COLONOSCOPY W/ POLYPECTOMY  10/31/2008  [facility]    2 mm polyp in the sigmoid colon, TUBULAR  ADENOMATOUS POLYP.  Erythematous mucosa rectum, proctitis (prep  proctitis?). Irritable bowel syndrome.     ESOPHAGOGASTRODUODENOSCOPY  08/15/2014    Dr. Colunga: Reflux esophagitis, No endoscopic esophageal abnormality to explain patient's dysphagia. Esophagus dilated, 54FR, history/examination was suspicious for an esophageal spasm; gastritis; biopsy: stomach- unremarkable, bishop test negative    ESOPHAGOGASTRODUODENOSCOPY N/A 10/21/2020    Surgeon: Earl Colunga Jr., MD;  Location: [facility]; Reflux esophagitis; Gastritis. Enlarged gastric folds; No endoscopic esophageal abnormality to explain patient's dysphagia. Esophagus dilated, 51FR; biopsy: duodenum WNL; stomach- REACTIVE GASTRITIS, negative H pylori    ESOPHAGOGASTRODUODENOSCOPY N/A 05/04/2022    Procedure: EGD (ESOPHAGOGASTRODUODENOSCOPY);  Surgeon: Earl Colunga Jr., MD;  Location: [facility];  Service: Endoscopy;  Laterality: N/A; Slight antritis, esophageal dilation performed; biopsy: stomach- mild chronic inactive gastritis with focal intestinal metaplasia (complete type), negative for dysplasia; negative for h pylori    ESOPHAGOGASTRODUODENOSCOPY N/A 7/2/2024    Procedure: EGD (ESOPHAGOGASTRODUODENOSCOPY);  Surgeon: Earl Colunga Jr., MD;  Location: [facility];  Service: Endoscopy;  Laterality: N/A;    GANGLION CYST EXCISION Right     HYSTERECTOMY  10/2007  [facility]    LIVER BIOPSY  10/20/2008    Mild portal mixed leukocytic infiltrate,neutrophils and lymphocytes.  Bile ducts seen, negative for leukocytic infiltration.  Negative for steatohepatitis. Mild sinusoidal dilatation and congestion. Negative for granulomata nor malignancy.  Reticulum stain negative for hepaticfibrosis.  Negative iron stain.  PAS stain, negative for PAS-positive inclusions.    LUMBAR DISCECTOMY      LUMBAR FUSION      MYELOGRAPHY N/A 01/07/2019    Procedure: Myelogram lumbar and thoracic;  Surgeon: Hari Rich MD;  [facility];  Service: Radiology;  Laterality:  N/A;    NECK SURGERY      neurostimulator removal       OOPHORECTOMY      pain pump      PELVIC LAPAROSCOPY      SELECTIVE INJECTION OF ANESTHETIC AGENT AROUND LUMBAR SPINAL NERVE ROOT BY TRANSFORAMINAL APPROACH Bilateral 01/30/2019    Procedure: BLOCK, SPINAL NERVE ROOT, LUMBAR, SELECTIVE, TRANSFORAMINAL APPROACH; L3;  Surgeon: Tez Edouard MD;  Location: [facility];  Service: Pain Management;  Laterality: Bilateral;    SPINAL CORD STIMULATOR IMPLANT      UPPER GASTROINTESTINAL ENDOSCOPY  10/31/2008  [facility]    Normal esophagus. Lax LES, non-erosive esophageal reflux (NERD?) Erythematous gastropathy on greater curve.  PRABHU Test performed on 10/31/08 was Negative.        Outpatient Medications Prior to Visit   Medication Sig Dispense Refill    albuterol (PROVENTIL/VENTOLIN HFA) 90 mcg/actuation inhaler INHALE 2 PUFFS BY MOUTH EVERY 6 HOURS AS NEEDED FOR WHEEZING 8.5 g 3    atorvastatin (LIPITOR) 20 MG tablet Take 1 tablet (20 mg total) by mouth every evening. 90 tablet 1    budesonide (PULMICORT) 0.5 mg/2 mL nebulizer solution USE 2 ML(0.5 MG) VIA NEBULIZER TWICE DAILY AS NEEDED 60 mL 1    cetirizine (ZYRTEC) 10 MG tablet Take 1 tablet (10 mg total) by mouth once daily. 90 tablet 3    cholecalciferol, vitamin D3, (VITAMIN D3) 50 mcg (2,000 unit) Cap capsule       cyanocobalamin 1,000 mcg/mL injection Inject 1 mL (1,000 mcg total) into the muscle every 30 days. 10 mL 1    CYMBALTA 30 mg capsule       dexlansoprazole (DEXILANT) 60 mg capsule Take 1 capsule (60 mg total) by mouth before breakfast. 90 capsule 3    DULoxetine (CYMBALTA) 60 MG capsule Take 60 mg by mouth.      dupilumab (DUPIXENT PEN SUBQ)       EPINEPHrine (EPIPEN) 0.3 mg/0.3 mL AtIn INJECT 0.3 ML( 0.3 MG TOTAL) IN THE MUSCLE AS NEEDED 2 each 2    famotidine (PEPCID) 40 MG tablet TAKE 1 TABLET(40 MG) BY MOUTH EVERY EVENING 90 tablet 3    gabapentin (NEURONTIN) 600 MG tablet TAKE 2 TABLETS(1200 MG) BY MOUTH THREE TIMES DAILY 540 tablet 1    levalbuterol  "(XOPENEX) 1.25 mg/3 mL nebulizer solution Take 3 mLs (1.25 mg total) by nebulization every 4 (four) hours as needed for Wheezing. Rescue 30 mL 1    linaCLOtide (LINZESS) 290 mcg Cap capsule Take 1 capsule (290 mcg total) by mouth before breakfast. 30 capsule 11    lisinopriL (PRINIVIL,ZESTRIL) 2.5 MG tablet TAKE 1 TABLET(2.5 MG) BY MOUTH EVERY DAY 90 tablet 3    metoprolol succinate (TOPROL-XL) 25 MG 24 hr tablet Take 0.5 tablets (12.5 mg total) by mouth once daily. 45 tablet 3    montelukast (SINGULAIR) 10 mg tablet Take 1 tablet (10 mg total) by mouth every evening. 90 tablet 3    naloxone (NARCAN) 4 mg/actuation Spry SMARTSIG:Both Nares      nitrofurantoin (MACRODANTIN) 50 MG capsule Take 2 capsules (100 mg total) by mouth every evening. for 280 doses 60 capsule 6    ondansetron (ZOFRAN) 8 MG tablet Take 1 tablet (8 mg total) by mouth every 8 (eight) hours. 6 tablet 1    potassium chloride (KLOR-CON) 10 MEQ TbSR TAKE 2 TABLETS(20 MEQ) BY MOUTH EVERY  tablet 3    pyridoxine, vitamin B6, (VITAMIN B-6) 100 MG Tab       sodium chloride 0.9% 0.9 % SolP with bupivacaine 0.5% (5 mg/ml) 0.5 % (5 mg/mL) Soln 0.1 % by Epidural route continuous.      solifenacin (VESICARE) 5 MG tablet Take 1 tablet (5 mg total) by mouth once daily. 30 tablet 11    tirzepatide (MOUNJARO) 7.5 mg/0.5 mL PnIj ADMINISTER 7.5 MG UNDER THE SKIN EVERY 7 DAYS 2 mL 6    BD LUER-KATHE SYRINGE 3 mL 25 x 1 1/2 " Syrg USE TO INJECT B12 INJECTION AS DIRECTED      morphine sulfate (MORPHINE, BULK, MISC) by Misc.(Non-Drug; Combo Route) route.      MOUNJARO 5 mg/0.5 mL PnIj 7.5 mg once a week. 5 mg if 7.5 is out of stock or not covered by insurance.      oxyCODONE (ROXICODONE) 10 mg Tab immediate release tablet TAKE 1 TABLET BY MOUTH EVERY 6 HOURS FOR 3 DAYS (Patient not taking: Reported on 11/14/2024)      promethazine (PHENERGAN) 25 MG tablet Take 25 mg by mouth every 8 (eight) hours as needed.       No facility-administered medications prior to " "visit.       Review of patient's allergies indicates:   Allergen Reactions    Doxycycline Anaphylaxis and Other (See Comments)    Iodinated contrast media Anaphylaxis, Hives and Other (See Comments)    Latex, natural rubber Anaphylaxis, Other (See Comments) and Shortness Of Breath     Also rash and itching      Shellfish containing products Anaphylaxis    Morphine Other (See Comments)     Hallucinations only with oral use    Povidone-iodine Hives       Family History   Problem Relation Name Age of Onset    Diabetes Father      Hypertension Father      Diabetes Mother      Hypertension Mother      Colon cancer Neg Hx      Crohn's disease Neg Hx      Ulcerative colitis Neg Hx      Stomach cancer Neg Hx      Esophageal cancer Neg Hx      Cirrhosis Neg Hx      Breast cancer Neg Hx         Social History     Tobacco Use    Smoking status: Every Day     Current packs/day: 1.00     Average packs/day: 1 pack/day for 17.0 years (17.0 ttl pk-yrs)     Types: Cigarettes, Vaping with nicotine     Start date: 2023    Smokeless tobacco: Never   Substance Use Topics    Alcohol use: Not Currently    Drug use: Yes     Types: Oxycodone, Morphine     Comment: medical marijuana        OB History    Para Term  AB Living   4 4 4         SAB IAB Ectopic Multiple Live Births                  # Outcome Date GA Lbr Smith/2nd Weight Sex Type Anes PTL Lv   4 Term            3 Term            2 Term            1 Term                  /74   Ht 5' 8" (1.727 m)   Wt 90.9 kg (200 lb 6.4 oz)     PE:   APPEARANCE: Well nourished, well developed, in no acute distress.  SKIN: Normal skin turgor, no lesions.  NECK: Neck symmetric without masses or thyromegaly.  NODES: No inguinal, cervical, axillary or femoral lymph node enlargement.  CHEST: Lungs clear to auscultation.  CARDIOVASCULAR: Normal S1, S2. No rubs, murmurs or gallops.  ABDOMEN: Soft. No tenderness or masses. No hepatosplenomegaly. No hernias.  PELVIC: Normal external " female genitalia without lesions. Normal hair distribution. Adequate perineal body, normal urethral meatus. Normal palpable bladder and urethra. Vagina dry and smooth  EXTREMITIES: NO clubbing cyanosis or edema        ASSESSMENT/PLAN:  Dyspareunia in female  -     Ambulatory referral/consult to Obstetrics / Gynecology  -     estradiol 0.05 mg/24 hr td ptsw (VIVELLE-DOT) 0.05 mg/24 hr; Place 1 patch onto the skin twice a week.  Dispense: 24 patch; Refill: 3    S/P hysterectomy with oophorectomy    Hormone replacement therapy (HRT)  Comments:  [id number]    Type 2 diabetes mellitus without complication, without long-term current use of insulin    Hypoactive sexual desire disorder  -     estradiol 0.05 mg/24 hr td ptsw (VIVELLE-DOT) 0.05 mg/24 hr; Place 1 patch onto the skin twice a week.  Dispense: 24 patch; Refill: 3    Neuropathy    Vaginal atrophy  -     estradiol 0.05 mg/24 hr td ptsw (VIVELLE-DOT) 0.05 mg/24 hr; Place 1 patch onto the skin twice a week.  Dispense: 24 patch; Refill: 3    HERNANDEZ (nonalcoholic steatohepatitis)      RTC 4 months.     30 minutes spent today spent preparing reviewing previous external notes, reviewing previous results, performing medical examination, ordering tests or medications, counseling and documenting.

## 2024-12-01 RX ORDER — EPINEPHRINE 0.3 MG/.3ML
INJECTION SUBCUTANEOUS
Qty: 2 EACH | Refills: 2 | OUTPATIENT
Start: 2024-12-01

## 2024-12-02 RX ORDER — EPINEPHRINE 0.3 MG/.3ML
INJECTION SUBCUTANEOUS
Qty: 2 EACH | Refills: 2 | Status: SHIPPED | OUTPATIENT
Start: 2024-12-02

## 2024-12-05 ENCOUNTER — OFFICE VISIT (OUTPATIENT)
Dept: FAMILY MEDICINE | Facility: CLINIC | Age: 44
End: 2024-12-05
Payer: COMMERCIAL

## 2024-12-05 VITALS
WEIGHT: 199.75 LBS | HEART RATE: 98 BPM | OXYGEN SATURATION: 97 % | RESPIRATION RATE: 18 BRPM | HEIGHT: 68 IN | SYSTOLIC BLOOD PRESSURE: 90 MMHG | TEMPERATURE: 98 F | BODY MASS INDEX: 30.27 KG/M2 | DIASTOLIC BLOOD PRESSURE: 60 MMHG

## 2024-12-05 DIAGNOSIS — J30.9 CHRONIC ALLERGIC RHINITIS: ICD-10-CM

## 2024-12-05 DIAGNOSIS — J45.20 MILD INTERMITTENT ASTHMA WITHOUT COMPLICATION: ICD-10-CM

## 2024-12-05 DIAGNOSIS — E11.9 TYPE 2 DIABETES MELLITUS WITHOUT COMPLICATION, WITHOUT LONG-TERM CURRENT USE OF INSULIN: Primary | ICD-10-CM

## 2024-12-05 DIAGNOSIS — Z00.00 LABORATORY EXAM ORDERED AS PART OF ROUTINE GENERAL MEDICAL EXAMINATION: ICD-10-CM

## 2024-12-05 DIAGNOSIS — I10 ESSENTIAL HYPERTENSION: ICD-10-CM

## 2024-12-05 DIAGNOSIS — Z84.0 FAMILY HISTORY OF LUPUS ERYTHEMATOSUS: ICD-10-CM

## 2024-12-05 RX ORDER — MONTELUKAST SODIUM 10 MG/1
10 TABLET ORAL NIGHTLY
Qty: 90 TABLET | Refills: 3 | Status: SHIPPED | OUTPATIENT
Start: 2024-12-05

## 2024-12-05 RX ORDER — METOPROLOL SUCCINATE 25 MG/1
12.5 TABLET, EXTENDED RELEASE ORAL DAILY
Qty: 45 TABLET | Refills: 3 | Status: SHIPPED | OUTPATIENT
Start: 2024-12-05 | End: 2025-12-05

## 2024-12-05 RX ORDER — AZELASTINE 1 MG/ML
1 SPRAY, METERED NASAL 2 TIMES DAILY
Qty: 30 ML | Refills: 5 | Status: SHIPPED | OUTPATIENT
Start: 2024-12-05 | End: 2025-12-05

## 2024-12-05 RX ORDER — LISINOPRIL 2.5 MG/1
2.5 TABLET ORAL DAILY
Qty: 90 TABLET | Refills: 3 | Status: SHIPPED | OUTPATIENT
Start: 2024-12-05

## 2024-12-05 NOTE — PROGRESS NOTES
Patient ID: Miri Earl is a 44 y.o. female.     History of Present Illness    CHIEF COMPLAINT:  Patient presents today for follow-up.    WEIGHT MANAGEMENT:  She reports significant weight loss, currently at 199 lbs, down from 212 lbs. This is her lowest weight since her first pregnancy 24 years ago.    DIABETES:  She is currently taking 7.5 mg of diabetes medication. She reports increased hunger and rising blood sugars, indicating a need for medication adjustment. Her last A1C was 5.4.    ALLERGY SYMPTOMS:  She experiences fluid in her ears causing intermittent hearing loss, particularly when lying down, which resolves after a few minutes of being upright. She also reports nasal drip, especially bothersome during bowel movements, and a sore throat every morning attributed to postnasal drip. She previously used Singulair, Alvesco, and Symbicort effectively for allergy symptoms but discontinued due to life circumstances. She tried Flonase in the past but experienced headaches as a side effect.    FAMILY MEDICAL HISTORY:  Her daughter was recently diagnosed with lupus, which she was informed is hereditary. She expresses concern about potentially having lupus herself and inquires about testing.     ROS:  General: -fever, -chills, -fatigue, -weight gain, +weight loss  Eyes: -vision changes, -redness, -discharge  ENT: -ear pain, -nasal congestion, +sore throat, +hearing loss  Cardiovascular: -chest pain, -palpitations, -lower extremity edema  Respiratory: -cough, -shortness of breath  Gastrointestinal: -abdominal pain, -nausea, -vomiting, -diarrhea, -constipation, -blood in stool  Genitourinary: -dysuria, -hematuria, -frequency  Musculoskeletal: -joint pain, -muscle pain  Skin: -rash, -lesion  Neurological: +headache, -dizziness, -numbness, -tingling  Psychiatric: -anxiety, -depression, -sleep difficulty         Physical Exam    Vitals:    12/05/24 1101   BP: 90/60   Pulse: 98   Resp: 18   Temp: 97.9 °F (36.6 °C)  "  TempSrc: Oral   SpO2: 97%   Weight: 90.6 kg (199 lb 11.8 oz)   Height: 5' 8" (1.727 m)   PainSc:   5     Body mass index is 30.37 kg/m².  Physical Exam    General: No acute distress. Well-developed. Well-nourished.  Eyes:. Sclerae anicteric.  HENT: Normocephalic. Atraumatic. Nares patent. Moist oral mucosa.  Ears: Fluid in ears. Bilateral EACs clear.  Cardiovascular: Regular rate. Regular rhythm. No murmurs. No rubs. No gallops. Normal S1, S2.  Respiratory: Normal respiratory effort. Clear to auscultation bilaterally. No rales. No rhonchi. No wheezing.  Abdomen: Soft. Non-tender. Non-distended. Normoactive bowel sounds.  Musculoskeletal: No  obvious deformity.  Extremities: No lower extremity edema.  Neurological: Alert & oriented x3. No slurred speech. Normal gait.  Psychiatric: Normal mood. Normal affect. Good insight. Good judgment.  Skin: Warm. Dry. No rash.                  Assessment & Plan    IMPRESSION:  - Increased Mounjaro from 7.5 mg to 10 mg due to patient's hunger and continued weight loss progress/DM  - Considered lupus workup for patient and son due to family history and symptoms  - Assessed ear symptoms, likely due to fluid behind ear, possibly related to allergies  - Evaluated allergy management options, considering patient's history with various medications    DM  - Increased Mounjaro from 7.5 mg to 10 mg.  -     tirzepatide 10 mg/0.5 mL PnIj; Inject 10 mg into the skin every 7 days.  -     Hemoglobin A1C; Future  -     lisinopriL (PRINIVIL,ZESTRIL) 2.5 MG tablet; Take 1 tablet (2.5 mg total) by mouth once daily.    ALLERGIC RHINITIS:  -     azelastine (ASTELIN) 137 mcg (0.1 %) nasal spray; 1 spray (137 mcg total) by Nasal route 2 (two) times daily.  - Continued Singulair for allergy management.  - Started astelin nasal spray for allergy symptoms.    LUPUS:  - Ordered lupus workup labs.    Essential hypertension  -     metoprolol succinate (TOPROL-XL) 25 MG 24 hr tablet; Take 0.5 tablets (12.5 mg " total) by mouth once daily.    FOLLOW-UP:  - Follow up in 3 months.             This note was generated with the assistance of ambient listening technology. Verbal consent was obtained by the patient and accompanying visitor(s) for the recording of patient appointment to facilitate this note. I attest to having reviewed and edited the generated note for accuracy, though some syntax or spelling errors may persist. Please contact the author of this note for any clarification.    I spent 30 minutes on this encounter, time includes face-to-face, chart review, documentation, test review and orders.

## 2024-12-13 ENCOUNTER — OFFICE VISIT (OUTPATIENT)
Dept: GASTROENTEROLOGY | Facility: CLINIC | Age: 44
End: 2024-12-13
Payer: COMMERCIAL

## 2024-12-13 VITALS — BODY MASS INDEX: 30.64 KG/M2 | HEIGHT: 68 IN | WEIGHT: 202.19 LBS

## 2024-12-13 DIAGNOSIS — Z87.19 HISTORY OF GASTRITIS: ICD-10-CM

## 2024-12-13 DIAGNOSIS — Z87.19 HISTORY OF HEMORRHOIDS: ICD-10-CM

## 2024-12-13 DIAGNOSIS — K59.03 CONSTIPATION DUE TO OPIOID THERAPY: ICD-10-CM

## 2024-12-13 DIAGNOSIS — K59.09 CHRONIC CONSTIPATION: Primary | ICD-10-CM

## 2024-12-13 DIAGNOSIS — T40.2X5A CONSTIPATION DUE TO OPIOID THERAPY: ICD-10-CM

## 2024-12-13 DIAGNOSIS — Z87.19 HISTORY OF GASTROESOPHAGEAL REFLUX (GERD): ICD-10-CM

## 2024-12-13 DIAGNOSIS — K62.89 RECTAL PAIN: ICD-10-CM

## 2024-12-13 DIAGNOSIS — K92.1 HEMATOCHEZIA: ICD-10-CM

## 2024-12-13 DIAGNOSIS — R11.0 NAUSEA: ICD-10-CM

## 2024-12-13 PROCEDURE — 99999 PR PBB SHADOW E&M-EST. PATIENT-LVL III: CPT | Mod: PBBFAC,,, | Performed by: NURSE PRACTITIONER

## 2024-12-13 PROCEDURE — 3060F POS MICROALBUMINURIA REV: CPT | Mod: CPTII,S$GLB,, | Performed by: NURSE PRACTITIONER

## 2024-12-13 PROCEDURE — 4010F ACE/ARB THERAPY RXD/TAKEN: CPT | Mod: CPTII,S$GLB,, | Performed by: NURSE PRACTITIONER

## 2024-12-13 PROCEDURE — 3008F BODY MASS INDEX DOCD: CPT | Mod: CPTII,S$GLB,, | Performed by: NURSE PRACTITIONER

## 2024-12-13 PROCEDURE — 99214 OFFICE O/P EST MOD 30 MIN: CPT | Mod: S$GLB,,, | Performed by: NURSE PRACTITIONER

## 2024-12-13 PROCEDURE — 3044F HG A1C LEVEL LT 7.0%: CPT | Mod: CPTII,S$GLB,, | Performed by: NURSE PRACTITIONER

## 2024-12-13 PROCEDURE — 3066F NEPHROPATHY DOC TX: CPT | Mod: CPTII,S$GLB,, | Performed by: NURSE PRACTITIONER

## 2024-12-13 RX ORDER — LUBIPROSTONE 24 UG/1
24 CAPSULE ORAL 2 TIMES DAILY WITH MEALS
Qty: 60 CAPSULE | Refills: 3 | Status: SHIPPED | OUTPATIENT
Start: 2024-12-13

## 2024-12-13 NOTE — PROGRESS NOTES
Subjective:       Patient ID: Miri Earl is a 44 y.o. female Body mass index is 30.74 kg/m².    Chief Complaint: Follow-up    Established patient of Dr. Colunga, hepatology,JONNY POWELL and BLAYNE Jo NP, & myself.    GI Problem  The primary symptoms include weight loss (trying to lose weight with dieting & mounjaro since 10/2023), nausea (occasional; zofran prn or phenergan PRN) and hematochezia (occasional when she is constipated; small amount of bright red blood on tissue & in bowl; denies bleeding in between bowel movements). Primary symptoms do not include fever, fatigue, abdominal pain, vomiting, diarrhea, melena, hematemesis, jaundice or dysuria.   The illness is also significant for constipation (chronic, bowel movements are once daily currently with straining; taking linzess 290 mcg once daily) and tenesmus. The illness does not include chills, dysphagia (has resolved since taking dexilant; egd with dilation helped in the past) or odynophagia. Associated symptoms comments: PAST TREATMENT: Movantik too expensive, Miralax in combo with linzess- no relief, enemas- no relief; symproic became ineffective. Significant associated medical issues include GERD (controlled with taking dexilant 60 mg once daily & pepcid 40 mg once daily; PAST TREATMENT: zantac stopped in 4/2020 due to FDA recall, nexium helped first time she tried it but not effective the last time tried, prilosec, rolaids, pepto), irritable bowel syndrome (PAST TREATMENT: colestid) and hemorrhoids (had banding done with Dr. Belcher in 11/2023). Associated medical issues do not include inflammatory bowel disease.     Review of Systems   Constitutional:  Positive for weight loss (trying to lose weight with dieting & mounjaro since 10/2023). Negative for appetite change, chills, fatigue and fever.        Has pain pump in place with morphine; also taking Toradol PO PRN- last took over a month ago   HENT:  Negative for sore throat and trouble swallowing.     Respiratory:  Negative for cough, choking and shortness of breath.    Cardiovascular:  Negative for chest pain.   Gastrointestinal:  Positive for anal bleeding, constipation (chronic, bowel movements are once daily currently with straining; taking linzess 290 mcg once daily), hematochezia (occasional when she is constipated; small amount of bright red blood on tissue & in bowl; denies bleeding in between bowel movements), nausea (occasional; zofran prn or phenergan PRN) and rectal pain (occasional; relates to external hemorrhoid). Negative for abdominal pain, diarrhea, dysphagia (has resolved since taking dexilant; egd with dilation helped in the past), hematemesis, jaundice, melena and vomiting.   Genitourinary:  Negative for dysuria.   Neurological:  Negative for weakness.       No LMP recorded. Patient has had a hysterectomy.  Past Medical History:   Diagnosis Date    Anxiety     Arthritis     Asthma     Breast cyst     Chronic back pain     Colon polyp     Depression     Diabetes mellitus     Elevated liver enzymes     Fibrocystic breast     Fibromyalgia     GERD (gastroesophageal reflux disease)     History of Chiari malformation     History of colitis     History of gastritis     Hypertension     Hypokalemia     IBS (irritable bowel syndrome)     Insomnia     MVP (mitral valve prolapse)     HERNANDEZ (nonalcoholic steatohepatitis) 2021    Neuropathy     Postmenopausal HRT (hormone replacement therapy)     Seasonal allergies     Sleep apnea with use of continuous positive airway pressure (CPAP)     Spondylitis     Thyroid nodule      Past Surgical History:   Procedure Laterality Date    APPENDECTOMY  10/15/2009  [facility]    BACK SURGERY      BACK SURGERY      xs 4    BACK SURGERY       SECTION      x 3    CHOLECYSTECTOMY      COLONOSCOPY    [place]    COLONOSCOPY  08/15/2014    Dr. Colunga: 1 colon polyp removed, hemorrhoids, Mild colonic spasm consistent with irritable bowel syndrome; repeat in 5  years for surveillance; biopsy: TUBULAR ADENOMA.    COLONOSCOPY N/A 10/21/2020    Procedure: COLONOSCOPY;  Surgeon: Earl Colunga Jr., MD; 2 colon polyps removed, hemorrhoids, Mild colonic spasm consistent with irritable bowel syndrome. repeat in 5 years for surveillance; biopsy: polyps- TUBULAR ADENOMA & hyperplastic polyp; random TI & colon biopsies WNL    COLONOSCOPY W/ POLYPECTOMY  10/31/2008  [facility]    2 mm polyp in the sigmoid colon, TUBULAR ADENOMATOUS POLYP.  Erythematous mucosa rectum, proctitis (prep  proctitis?). Irritable bowel syndrome.     ESOPHAGOGASTRODUODENOSCOPY  08/15/2014    Dr. Colunga: Reflux esophagitis, No endoscopic esophageal abnormality to explain patient's dysphagia. Esophagus dilated, 54FR, history/examination was suspicious for an esophageal spasm; gastritis; biopsy: stomach- unremarkable, bishop test negative    ESOPHAGOGASTRODUODENOSCOPY N/A 10/21/2020    Surgeon: Earl Colunga Jr., MD;  Location: [facility]; Reflux esophagitis; Gastritis. Enlarged gastric folds; No endoscopic esophageal abnormality to explain patient's dysphagia. Esophagus dilated, 51FR; biopsy: duodenum WNL; stomach- REACTIVE GASTRITIS, negative H pylori    ESOPHAGOGASTRODUODENOSCOPY N/A 05/04/2022    Procedure: EGD (ESOPHAGOGASTRODUODENOSCOPY);  Surgeon: Earl Colunga Jr., MD;  Location: [facility];  Service: Endoscopy;  Laterality: N/A; Slight antritis, esophageal dilation performed; biopsy: stomach- mild chronic inactive gastritis with focal intestinal metaplasia (complete type), negative for dysplasia; negative for h pylori    ESOPHAGOGASTRODUODENOSCOPY N/A 7/2/2024    Procedure: EGD (ESOPHAGOGASTRODUODENOSCOPY);  Surgeon: Earl Colunga Jr., MD;  Location: [facility];  Service: Endoscopy;  Laterality: N/A;    GANGLION CYST EXCISION Right     HYSTERECTOMY  10/2007  [place]    LIVER BIOPSY  10/20/2008    Mild portal mixed leukocytic infiltrate,neutrophils and lymphocytes.  Bile ducts seen, negative for  leukocytic infiltration.  Negative for steatohepatitis. Mild sinusoidal dilatation and congestion. Negative for granulomata nor malignancy.  Reticulum stain negative for hepaticfibrosis.  Negative iron stain.  PAS stain, negative for PAS-positive inclusions.    LUMBAR DISCECTOMY      LUMBAR FUSION      MYELOGRAPHY N/A 01/07/2019    Procedure: Myelogram lumbar and thoracic;  Surgeon: Hari Rich MD;  Location: [facility];  Service: Radiology;  Laterality: N/A;    NECK SURGERY      neurostimulator removal       OOPHORECTOMY      pain pump      PELVIC LAPAROSCOPY      SELECTIVE INJECTION OF ANESTHETIC AGENT AROUND LUMBAR SPINAL NERVE ROOT BY TRANSFORAMINAL APPROACH Bilateral 01/30/2019    Procedure: BLOCK, SPINAL NERVE ROOT, LUMBAR, SELECTIVE, TRANSFORAMINAL APPROACH; L3;  Surgeon: Tez Edouard MD;  Location: [facility];  Service: Pain Management;  Laterality: Bilateral;    SPINAL CORD STIMULATOR IMPLANT      UPPER GASTROINTESTINAL ENDOSCOPY  10/31/2008  [facility]    Normal esophagus. Lax LES, non-erosive esophageal reflux (NERD?) Erythematous gastropathy on greater curve.  PRABHU Test performed on 10/31/08 was Negative.      Family History   Problem Relation Name Age of Onset    Diabetes Father      Hypertension Father      Diabetes Mother      Hypertension Mother      Colon cancer Neg Hx      Crohn's disease Neg Hx      Ulcerative colitis Neg Hx      Stomach cancer Neg Hx      Esophageal cancer Neg Hx      Cirrhosis Neg Hx      Breast cancer Neg Hx       Social History     Tobacco Use    Smoking status: Every Day     Types: Cigarettes, Vaping with nicotine     Start date: 9/1/2023    Smokeless tobacco: Never    Tobacco comments:     2 cigarettes a day   Substance Use Topics    Alcohol use: Not Currently    Drug use: Yes     Types: Morphine     Wt Readings from Last 10 Encounters:   12/13/24 91.7 kg (202 lb 2.6 oz)   12/05/24 90.6 kg (199 lb 11.8 oz)   11/14/24 90.9 kg (200 lb 6.4 oz)   10/08/24 95.3 kg (210 lb)    08/07/24 96.3 kg (212 lb 6.6 oz)   06/25/24 99.3 kg (219 lb)   06/04/24 100.7 kg (222 lb 0.1 oz)   04/30/24 100.3 kg (221 lb 0.2 oz)   03/04/24 106.6 kg (235 lb 0.2 oz)   02/29/24 106.6 kg (235 lb 0.2 oz)     Lab Results   Component Value Date    WBC 6.19 05/06/2024    HGB 13.1 05/06/2024    HCT 40.7 05/06/2024    MCV 93 05/06/2024     05/06/2024     CMP  Sodium   Date Value Ref Range Status   05/06/2024 140 136 - 145 mmol/L Final   10/15/2015 143 137 - 145 MMOL/L Final     Potassium   Date Value Ref Range Status   05/06/2024 4.4 3.5 - 5.1 mmol/L Final   10/15/2015 4.6 3.5 - 5.1 MMOL/L Final     Chloride   Date Value Ref Range Status   05/06/2024 107 95 - 110 mmol/L Final   10/15/2015 109 (H) 98 - 107 MMOL/L Final     CO2   Date Value Ref Range Status   05/06/2024 27 23 - 29 mmol/L Final     Glucose   Date Value Ref Range Status   05/06/2024 87 70 - 110 mg/dL Final     BUN   Date Value Ref Range Status   05/06/2024 5 (L) 6 - 20 mg/dL Final     Creatinine   Date Value Ref Range Status   05/06/2024 0.7 0.5 - 1.4 mg/dL Final   10/15/2015 0.84 0.52 - 1.04 MG/DL Final     Calcium   Date Value Ref Range Status   05/06/2024 9.6 8.7 - 10.5 mg/dL Final     Total Protein   Date Value Ref Range Status   05/06/2024 6.8 6.0 - 8.4 g/dL Final     Albumin   Date Value Ref Range Status   05/06/2024 3.5 3.5 - 5.2 g/dL Final   10/15/2015 4.0 3.5 - 5.0 G/DL Final     Total Bilirubin   Date Value Ref Range Status   05/06/2024 0.4 0.1 - 1.0 mg/dL Final     Comment:     For infants and newborns, interpretation of results should be based  on gestational age, weight and in agreement with clinical  observations.    Premature Infant recommended reference ranges:  Up to 24 hours.............<8.0 mg/dL  Up to 48 hours............<12.0 mg/dL  3-5 days..................<15.0 mg/dL  6-29 days.................<15.0 mg/dL       Alkaline Phosphatase   Date Value Ref Range Status   05/06/2024 121 55 - 135 U/L Final     AST ([place])    Date Value Ref Range Status   01/19/2016 26 14 - 36 U/L Final     AST   Date Value Ref Range Status   05/06/2024 21 10 - 40 U/L Final     ALT   Date Value Ref Range Status   05/06/2024 30 10 - 44 U/L Final     Anion Gap   Date Value Ref Range Status   05/06/2024 6 (L) 8 - 16 mmol/L Final     eGFR if    Date Value Ref Range Status   06/01/2022 >60.0 >60 mL/min/1.73 m^2 Final     eGFR if non    Date Value Ref Range Status   06/01/2022 >60.0 >60 mL/min/1.73 m^2 Final     Comment:     Calculation used to obtain the estimated glomerular filtration  rate (eGFR) is the CKD-EPI equation.        Lab Results   Component Value Date    HEPAIGM Negative 09/21/2020    HEPBIGM Negative 09/21/2020    HEPCAB Negative 07/18/2022     Lab Results   Component Value Date    LIPASE 40 01/23/2017     Lab Results   Component Value Date    TSH 0.208 (L) 09/18/2024     10/21/2020 & 9/21/2020 stool studies reviewed (acidic stools)  10/11/2022 stool studies reviewed    Reviewed prior medical records including radiology report of 3/24/2024 CT abdomen pelvis without contrast in care everywhere; 5/6/2024 limited abdominal ultrasound; 7/14/2022 gastric emptying study; 12/27/2021 visit note with JONNY POWELL with hepatology; 11/16/2023 visit note with Dr. Belcher; & endoscopy history (see surgical history).    Objective:      Physical Exam  Vitals and nursing note reviewed.   Constitutional:       General: She is not in acute distress.     Appearance: Normal appearance. She is well-developed. She is not diaphoretic.   HENT:      Mouth/Throat:      Lips: Pink. No lesions.      Mouth: Mucous membranes are moist. No oral lesions.      Tongue: No lesions.      Pharynx: Oropharynx is clear. No pharyngeal swelling or posterior oropharyngeal erythema.   Eyes:      General: No scleral icterus.     Conjunctiva/sclera: Conjunctivae normal.   Pulmonary:      Effort: Pulmonary effort is normal. No respiratory distress.       Breath sounds: Normal breath sounds. No wheezing.   Abdominal:      General: Bowel sounds are normal. There is no distension or abdominal bruit.      Palpations: Abdomen is soft. Abdomen is not rigid. There is no mass.      Tenderness: There is no abdominal tenderness. There is no guarding or rebound. Negative signs include 's sign and McBurney's sign.      Comments: Deferred rectal examination.   Skin:     General: Skin is warm and dry.      Coloration: Skin is not jaundiced or pale.      Findings: No erythema or rash.   Neurological:      Mental Status: She is alert and oriented to person, place, and time.   Psychiatric:         Speech: Speech normal.         Behavior: Behavior normal.         Thought Content: Thought content normal.         Judgment: Judgment normal.         Assessment:       1. Chronic constipation    2. Constipation due to opioid therapy    3. History of hemorrhoids    4. Hematochezia    5. Rectal pain    6. Nausea    7. History of gastroesophageal reflux (GERD)    8. History of gastritis          Plan:       Chronic constipation & Constipation due to opioid therapy  - DISCONTINUE LINZESS DUE TO ALTERNATE THERAPY  -  START   lubiprostone (AMITIZA) 24 MCG Cap; Take 1 capsule (24 mcg total) by mouth 2 (two) times daily with meals.  Dispense: 60 capsule; Refill: 3  - Recommend daily exercise as tolerated, adequate water intake (six 8-oz glasses of water daily), and high fiber diet. OTC fiber supplements are recommended if diet does not reach daily fiber goal (20-30 grams daily), such as Metamucil, Citrucel, or FiberCon (take as directed, separate from other oral medications by >2 hours).  - discussed with patient that a side effect of narcotic pain medications & mounjaro are constipation, advised patient to talk to provider who manages pain medication, patient verbalized understanding    History of hemorrhoids & Rectal pain  -     Ambulatory referral/consult to Colorectal Surgery ([place]  [facility]); Future; Expected date: 12/22/2024  - avoid constipation and straining with bowel movements; try using an OTC stool softener as directed and increase fiber in diet (20-30 grams daily)/OTC fiber supplement such as metamucil (take as directed)  - recommend SITZ baths    Hematochezia  -     Ambulatory referral/consult to Colorectal Surgery ([place]); Future; Expected date: 12/22/2024  - recommend follow-up with Dr. Belcher for evaluation of hemorrhoids; if bleeding persists/does not improve, follow-up to schedule lower endoscopy  - discussed about different etiologies that can cause rectal bleeding, such as but not limited to diverticulosis, polyps, colon mass, colon inflammation or infection, anal fissure or hemorrhoids.   - You may resume normal activity as long as you feel well.  - Avoid/minimize NSAIDs such as ibuprofen (Advil, Motrin) and naproxen (Aleve and Naprosyn).  - Avoid alcohol.    Nausea  - CONTINUE ZOFRAN PRN AS DIRECTED FOR NAUSEA OR PHENERGAN PRN AS DIRECTED FOR NAUSEA (advised not to take together)  - discussed with patient that a side effect of narcotic pain medications and mounjaro are constipation & nausea, advised patient to talk to provider who manages pain medication, patient verbalized understanding    History of gastroesophageal reflux (GERD) & History of gastritis  -  CONTINUE   dexlansoprazole (DEXILANT) 60 mg capsule; Take 1 capsule (60 mg total) by mouth before breakfast.  - CONTINUE PEPCID 40 MG NIGHTLY AS DIRECTED  - avoid/minimize use of NSAIDs- since they can cause GI upset, bleeding and/or ulcers. If NSAID must be taken, recommend take with food.    Follow up in about 1 month (around 1/13/2025), or if symptoms worsen or fail to improve.    If no improvement in symptoms or symptoms worsen, call/follow-up at clinic or go to ER.        38 minutes of total time spent on the encounter, which includes face to face time and non-face to face time preparing to see the patient (e.g.,  review of tests), Obtaining and/or reviewing separately obtained history, Documenting clinical information in the electronic or other health record, Independently interpreting results (not separately reported) and communicating results to the patient/family/caregiver, or Care coordination (not separately reported).

## 2024-12-17 DIAGNOSIS — R12 HEARTBURN: ICD-10-CM

## 2024-12-22 RX ORDER — DEXLANSOPRAZOLE 60 MG/1
CAPSULE, DELAYED RELEASE ORAL
Qty: 90 CAPSULE | Refills: 3 | Status: SHIPPED | OUTPATIENT
Start: 2024-12-22

## 2024-12-23 ENCOUNTER — PATIENT MESSAGE (OUTPATIENT)
Dept: HEPATOLOGY | Facility: CLINIC | Age: 44
End: 2024-12-23
Payer: COMMERCIAL

## 2024-12-27 ENCOUNTER — PATIENT MESSAGE (OUTPATIENT)
Dept: GASTROENTEROLOGY | Facility: CLINIC | Age: 44
End: 2024-12-27
Payer: COMMERCIAL

## 2024-12-27 DIAGNOSIS — K59.09 CHRONIC CONSTIPATION: Primary | ICD-10-CM

## 2025-01-03 ENCOUNTER — HOSPITAL ENCOUNTER (OUTPATIENT)
Dept: RADIOLOGY | Facility: HOSPITAL | Age: 45
Discharge: HOME | End: 2025-01-03
Attending: PHYSICIAN ASSISTANT
Payer: COMMERCIAL

## 2025-01-03 DIAGNOSIS — K74.00 LIVER FIBROSIS: ICD-10-CM

## 2025-01-03 DIAGNOSIS — K75.81 NASH (NONALCOHOLIC STEATOHEPATITIS): ICD-10-CM

## 2025-01-03 PROCEDURE — 76705 ECHO EXAM OF ABDOMEN: CPT | Mod: TC,PO

## 2025-01-06 ENCOUNTER — OFFICE VISIT (OUTPATIENT)
Dept: HEPATOLOGY | Facility: CLINIC | Age: 45
End: 2025-01-06
Payer: COMMERCIAL

## 2025-01-06 DIAGNOSIS — E66.811 CLASS 1 OBESITY WITH BODY MASS INDEX (BMI) OF 30.0 TO 30.9 IN ADULT, UNSPECIFIED OBESITY TYPE, UNSPECIFIED WHETHER SERIOUS COMORBIDITY PRESENT: ICD-10-CM

## 2025-01-06 DIAGNOSIS — I10 PRIMARY HYPERTENSION: ICD-10-CM

## 2025-01-06 DIAGNOSIS — K75.81 METABOLIC DYSFUNCTION-ASSOCIATED STEATOHEPATITIS (MASH): Primary | ICD-10-CM

## 2025-01-06 DIAGNOSIS — K74.00 LIVER FIBROSIS: ICD-10-CM

## 2025-01-06 DIAGNOSIS — E78.5 HYPERLIPIDEMIA, UNSPECIFIED HYPERLIPIDEMIA TYPE: ICD-10-CM

## 2025-01-06 RX ORDER — RESMETIROM 80 MG/1
80 TABLET, COATED ORAL DAILY
Qty: 30 TABLET | Refills: 11 | Status: ACTIVE | OUTPATIENT
Start: 2025-01-06

## 2025-01-06 RX ORDER — RESMETIROM 80 MG/1
80 TABLET, COATED ORAL DAILY
Qty: 30 TABLET | Refills: 11 | Status: SHIPPED | OUTPATIENT
Start: 2025-01-06 | End: 2025-01-06

## 2025-01-06 NOTE — PROGRESS NOTES
The patient location is: [place]  The chief complaint leading to consultation is: [facility]    Visit type: audiovisual    30 minutes of total time spent on the encounter, which includes face to face time and non-face to face time preparing to see the patient (eg, review of tests), Obtaining and/or reviewing separately obtained history, Documenting clinical information in the electronic or other health record, Independently interpreting results (not separately reported) and communicating results to the patient/family/caregiver, or Care coordination (not separately reported).     Each patient to whom he or she provides medical services by telemedicine is:  (1) informed of the relationship between the physician and patient and the respective role of any other health care provider with respect to management of the patient; and (2) notified that he or she may decline to receive medical services by telemedicine and may withdraw from such care at any time.    Notes:       Hepatology Consultation: [facility]    ESTABLISHED PATIENT    Subjective      Ms. Earl is a 44 y.o. female who returns for continued evalation of elevated LFTs and hepatic steatosis. LFT review suggest mixed picture with mild alk phos elevations as well as ALT>AST; recently albumin a bit low. Plt WNL. Hepatomegaly + steatosis, without splenic enlargement on ultrasound 4/6/2021     RF for fatty liver include HTN, HLD, T2DM,   Weight gain in the past year or so, BMI 42  CCY 2007 8 back surgeries total -- she has a pain pump for this    We obtained a liver biopsy due to worsening elevated liver enzymes and MRE that was nondiagnostic due to patient's pan pump 5/14/2021.    Liver biopsy 6/16/2021  Final Pathologic Diagnosis Native liver (random biopsy):   - Macrovesicular steatosis 70%, microvesicular steatosis 15%, with   steatohepatitis (see CODI grading form below)   - Steatohepatitis score 7, stage 2   - Negative for  Ivett-Denk bodies   - Iron: No deposit (Grade 0/4)   - Trichrome: Stage 2/4 fibrosis   - Iron and trichrome stains with appropriate controls   CODI grade score 7/8   Fatty liver 3 (>67%)   Inflammation 2 (2 to 4 foci)   Balloon cells 2 (many)      She was thereafter considered for clinical trial but was not a candidate due to use of Ozempic and that she had lost significant amount of weight.     She lost weight at follow-up visit 12/2021 with improvement of liver enzymes. Switched to tradjenta with success. Unable to consult with nutritionist since.    Interval history 07/25/2022:  Miri Earl arrives today alone on video   She denies symptoms of hepatic decompensation including jaundice, abdominal distention or lower extremity swelling, hematemesis, melena, or periods of confusion suggestive of hepatic encephalopathy.  She continues tradjenta - working well and is currently at 255 lbs -- she has plateaued. Is no longer keeping a food diary.   Reflux has worsened , continued stomach pain - seeing GI  Fatigue is still there.    Interval history 07/25/2023:  Miri Earl arrives today alone on video  She is down to 239 lbs. Feeling good from that standpoint.   She did have her pain stimulator replaced last year  She continues to suffer with constipation and seeing gastro for this - now is going daily with Linzess.  Denies symptoms of hepatic decompensation including jaundice, ascites, cognitive problems to suggest hepatic encephalopathy, or GI bleeding.   She reports significant nighttime itching 2-3 weeks, on a daily antihistamine Zyrtec  Nerve block prior to liver labs - took benadryl prior     Interval history 08/10/2023:  Miri Earl arrives today alone. Since our last visit, doing fine. Itching has improved and uses benadryl occasionally.  She is potentially interested in GLP-1, next a1c and endocrine meeting is in September.    Denies symptoms of hepatic decompensation including jaundice, abdominal  distention or lower extremity swelling, hematemesis, melena, or periods of confusion suggestive of hepatic encephalopathy.    ETOH: rarely   Smoking: no   Illicit substances: no   Social: lives at home with young children   Prior liver biopsy: yes, 2001 @ [facility]   Prior liver disease: yes, fatty liver   FAMILY HISTORY: denies a history of liver cancer, cirrhosis, hepatitis; but mother w/ fatty liver.    Interval history 5/27/2024:  Miri Earl arrives today alone. She was last seen in clinic by Alice Sen PA-C in 8/2023.  Prior liver biopsy in 2021 showed HERNANDEZ with stage 2 fibrosis. Follow up Fibroscan in 8/2023 was suggestive of F3 (advanced) fibrosis.   She is on Mounjaro and has lost 21 lbs since last visit. Her LFT's have normalized with weight loss. B/P is well controlled. HgbA1c is also improved with weight loss (5.7% - 1/2024).    She is well appearing and denies signs or symptoms of hepatic decompensation including jaundice, abdominal distention or lower extremity swelling, hematemesis, melena, or periods of confusion suggestive of hepatic encephalopathy.    Interval history 1/6/2025:  Miri Earl arrives today alone. She was last seen in clinic by myself in 5/2024.   Prior liver biopsy in 2021 showed HERNANDEZ with stage 2 fibrosis. Follow up Fibroscan in 8/2023 was suggestive of F3 (advanced) fibrosis.   She remains on Mounjaro and has lost 40 lbs over the past year. Her LFT's had previously normalized with weight loss.   Her metabolic risk factors are all well controlled. Of note, TSH is mildly abnormal at (0.208). She is followed by Endocrinology for multinodular goiter.    Most recent ultrasound of the abdomen this month showed:    US Abdomen Limited  Narrative: EXAMINATION:  US ABDOMEN LIMITED    CLINICAL HISTORY:  .    Nonalcoholic steatohepatitis (HERNANDEZ)    TECHNIQUE:  Limited ultrasound of the right upper quadrant of the abdomen including pancreas, liver, gallbladder, common bile duct  was performed.    COMPARISON:  Ultrasound 2024    FINDINGS:  Liver: Normal in size, measuring 16.9 cm. Diffusely hyperechoic parenchyma consistent with steatosis.  HRI measures 1.6.  No focal hepatic lesions.    Gallbladder: The gallbladder is surgically absent.    Biliary system: The common duct is not dilated, measuring 4 mm.  No intrahepatic ductal dilatation.    Spleen: Normal in size with a homogeneous echotexture, measuring 10.4 x 5.3 cm.    Pancreas: The visualized portions of pancreas appear normal.    Miscellaneous: No ascites.  Impression: 1. Hepatic steatosis.  2. Cholecystectomy.    Electronically signed by: Arash Fleming  Date:    2025  Time:    08:48    At last visit, she was prescribed Rezdiffra, but insurance denied the medication. The plan was to re-initiate prior authorization this year.     She is well appearing and has no signs or symptoms of hepatic decompensation including jaundice, abdominal distention or lower extremity swelling, hematemesis, melena, or periods of confusion suggestive of hepatic encephalopathy.    PMH April has a past medical history of Anxiety, Arthritis, Asthma, Breast cyst, Chronic back pain, Colon polyp, Depression, Diabetes mellitus, Elevated liver enzymes, Fibrocystic breast, Fibromyalgia, GERD (gastroesophageal reflux disease), History of Chiari malformation, History of colitis, History of gastritis, Hypertension, Hypokalemia, IBS (irritable bowel syndrome), Insomnia, MVP (mitral valve prolapse), HERNANDEZ (nonalcoholic steatohepatitis) (2021), Neuropathy, Postmenopausal HRT (hormone replacement therapy), Seasonal allergies, Sleep apnea with use of continuous positive airway pressure (CPAP), Spondylitis, and Thyroid nodule.   PSXH April has a past surgical history that includes Ganglion cyst excision (Right); Hysterectomy (10/2007  Manisha); Appendectomy (10/15/2009  Kristen);  section; Cholecystectomy; Back surgery; Lumbar discectomy; Lumbar  fusion; Colonoscopy w/ polypectomy (10/31/2008  Keanu); Liver biopsy (10/20/2008); Pelvic laparoscopy; Spinal cord stimulator implant; Back surgery; Back surgery; pain pump; neurostimulator removal ; Oophorectomy; Myelography (N/A, 01/07/2019); Selective injection of anesthetic agent around lumbar spinal nerve root by transforaminal approach (Bilateral, 01/30/2019); Esophagogastroduodenoscopy (08/15/2014); Upper gastrointestinal endoscopy (10/31/2008  Keanu); Esophagogastroduodenoscopy (N/A, 10/21/2020); Colonoscopy (2006  [place]); Colonoscopy (08/15/2014); Colonoscopy (N/A, 10/21/2020); Esophagogastroduodenoscopy (N/A, 05/04/2022); Neck surgery; and Esophagogastroduodenoscopy (N/A, 7/2/2024).   LEONA Chery's family history includes Diabetes in her father and mother; Hypertension in her father and mother.    April reports that she has been smoking cigarettes and vaping with nicotine. She started smoking about 16 months ago. She has never used smokeless tobacco. She reports that she does not currently use alcohol. She reports current drug use. Drug: Morphine.   ELVER Chery is allergic to doxycycline; iodinated contrast media; latex, natural rubber; shellfish containing products; morphine; and povidone-iodine.   MED April has a current medication list which includes the following prescription(s): albuterol, atorvastatin, azelastine, bd luer-kristen syringe, budesonide, cetirizine, cholecalciferol (vitamin d3), cyanocobalamin, dexlansoprazole, duloxetine, dupilumab, epinephrine, estradiol 0.05 mg/24 hr td ptsw, famotidine, gabapentin, levalbuterol, linaclotide, lisinopril, metoprolol succinate, montelukast, morphine sulfate, naloxone, nitrofurantoin, ondansetron, oxycodone, potassium chloride, promethazine, pyridoxine (vitamin b6), sodium chloride 0.9% 0.9 % SolP with bupivacaine 0.5% (5 mg/ml) 0.5 % (5 mg/mL) Soln 0.1 %, solifenacin, and tirzepatide.     ROS:   As per hpi     Objective     SKIN/EYES: No obvious jaundice or  yellowing of the eyes.   HENT: Normocephalic, without obvious abnormality.   NECK: No obvious masses.   RESPIRATORY: Normal respiratory effort.   GI: Obese abdomen.   EXT: No peripheral edema, per patient report.   PSYCH: Thought and speech pattern appropriate.     Lab Results   Component Value Date    ALT 30 05/06/2024    AST 21 05/06/2024    ALKPHOS 121 05/06/2024    BILITOT 0.4 05/06/2024    ALBUMIN 3.5 05/06/2024    INR 0.9 01/03/2025     01/03/2025     Lab Results   Component Value Date    AFP 2.1 01/03/2025     Prior serologic workup:   Lab Results   Component Value Date    SMOOTHMUSCAB <1:20 06/07/2021    AMAIFA Negative 10/14/2008    IGGSERUM 1004 06/07/2021    ANASCREEN None Detected 04/12/2024    FERRITIN 138 (H) 06/07/2021    HEPBSAG Negative 07/18/2022    HEPCAB Negative 07/18/2022    HEPAIGM Negative 09/21/2020     DIAGNOSTIC STUDIES:    6/16/2021  Native liver (random biopsy):   - Macrovesicular steatosis 70%, microvesicular steatosis 15%, with   steatohepatitis (see CODI grading form below)   - Steatohepatitis score 7, stage 2   - Negative for Ivett-Denk bodies   - Iron: No deposit (Grade 0/4)   - Trichrome: Stage 2/4 fibrosis   - Iron and trichrome stains with appropriate controls   CODI grade score 7/8   Fatty liver 3 (>67%)   Inflammation 2 (2 to 4 foci)   Balloon cells 2 (many)     US Abdomen Limited  EXAMINATION:  US ABDOMEN LIMITED 07/18/2022 at 09:39    INDICATION:  Clinical history is provided of hepatic steatosis, abnormal laboratory values.  Previous cholecystectomy.  No history of recent trauma or surgery is provided.    COMPARISON  CT abdomen report of 07/12/2021.  Abdominal ultrasound report of 04/06/2021.    FINDINGS  No free fluid collections are appreciated. The liver measures 19.8 cm and demonstrates heterogeneous, hyperechoic echogenicity.  No gross contour deformity is appreciated.  Portal venous flow is within normal.  The common bile duct measures 6 mm.  The pancreas is  largely obscured. There is bowel gas, penetration artifact present.  No fluid-filled gallbladder is appreciated at the anatomic location corresponding to the clinical history provided.  No history of tenderness is provided. The right kidney measures 13.5 x 5.4 x 5.2 cm. No echogenic obstructive calculus or hydronephrosis is appreciated. The proximal aorta measures 1.9 cm, mid 1.5 cm and distal 1.2 cm with unremarkable adjacent IVC color Doppler flow demonstrated.  No other significant interval changes are appreciated.    IMPRESSION  1.  No free fluid collection is appreciated.  2. There is heterogeneous, hyperechoic echotexture of the liver overall similar.  This could be seen with steatosis, chronic hepatic changes.  3.  No echogenic obstructive calculus, biliary dilatation or hydronephrosis is appreciated.    Electronically signed by: Juan Humphrey MD  Date:    07/18/2022  Time:    10:59    FIBROSCAN 8/10/2023:    Findings  Median liver stiffness score:  10.9  CAP Reading: dB/m:  308     IQR/med %:  6  Interpretation  Fibrosis interpretation is based on medial liver stiffness - Kilopascal (kPa).     Fibrosis Stage:  F3  Steatosis interpretation is based on controlled attenuation parameter - (dB/m).     Steatosis Grade:  S3    US Abdomen Limited  Narrative: EXAMINATION:  US ABDOMEN LIMITED    CLINICAL HISTORY:  .    Nonalcoholic steatohepatitis (HERNANDEZ)    TECHNIQUE:  Limited ultrasound of the right upper quadrant of the abdomen including pancreas, liver, gallbladder, common bile duct was performed.    COMPARISON:  Ultrasound 05/06/2024    FINDINGS:  Liver: Normal in size, measuring 16.9 cm. Diffusely hyperechoic parenchyma consistent with steatosis.  HRI measures 1.6.  No focal hepatic lesions.    Gallbladder: The gallbladder is surgically absent.    Biliary system: The common duct is not dilated, measuring 4 mm.  No intrahepatic ductal dilatation.    Spleen: Normal in size with a homogeneous echotexture, measuring  10.4 x 5.3 cm.    Pancreas: The visualized portions of pancreas appear normal.    Miscellaneous: No ascites.  Impression: 1. Hepatic steatosis.  2. Cholecystectomy.    Electronically signed by: Arash Fleming  Date:    01/03/2025  Time:    08:48    Assessment/Plan     44 y.o. White female with:    1. Metabolic dysfunction-associated steatohepatitis (MASH)  resmetirom (REZDIFFRA) 80 mg Tab    DISCONTINUED: resmetirom (REZDIFFRA) 80 mg Tab      2. Liver fibrosis, F2, biopsy proven 6/2021  resmetirom (REZDIFFRA) 80 mg Tab    DISCONTINUED: resmetirom (REZDIFFRA) 80 mg Tab      3. Class 1 obesity with body mass index (BMI) of 30.0 to 30.9 in adult, unspecified obesity type, unspecified whether serious comorbidity present        4. Hyperlipidemia, unspecified hyperlipidemia type        5. Primary hypertension          - RX for Rezdiffra 80 mg PO daily for the treatment of HERNANDEZ with fibrosis sent to [facility] to begin PA process.  - Repeat CMP 3 months after initiation of treatment.  - Recommend an Ultrasound of the liver annually, next due 1/2026.  - Continue Mounjaro, as prescribed.   - Recommend additional weight loss goal of 15-20 lbs, through diet and exercise.  - Recommend good control of cholesterol, blood pressure, & blood sugar levels.  - Avoid alcohol and herbal supplements/alternative remedies.  - Return to clinic 3-6 months after starting Rezdiffra, with labs drawn prior to the visit.     I spent 30 minutes on the day of this encounter preparing for, evaluating, treating, and managing this patient.     Thank you for allowing me to participate in the care of Miri Earl       Hepatology Nurse Practitioner  [facility]

## 2025-01-15 ENCOUNTER — PATIENT MESSAGE (OUTPATIENT)
Dept: HEPATOLOGY | Facility: CLINIC | Age: 45
End: 2025-01-15
Payer: COMMERCIAL

## 2025-01-23 ENCOUNTER — TELEPHONE (OUTPATIENT)
Dept: HEPATOLOGY | Facility: CLINIC | Age: 45
End: 2025-01-23
Payer: COMMERCIAL

## 2025-01-23 NOTE — TELEPHONE ENCOUNTER
"----- Message from Louis sent at 1/21/2025  4:24 PM CST -----  Regarding: call back  Consult/Advisory:        Name Of Caller: Haley with [facility]     Contact Preference?:[phone] opt#2     What is the nature of the call?: Calling to speak w/ someone in regards to needing an update on the pt's paperwork for PAP on her RX for Rezdiffra requesting call back     Additional Notes:  "Thank you for all that you do for our patients"  "

## 2025-01-25 DIAGNOSIS — J45.20 MILD INTERMITTENT ASTHMA WITHOUT COMPLICATION: ICD-10-CM

## 2025-01-25 RX ORDER — MONTELUKAST SODIUM 10 MG/1
10 TABLET ORAL NIGHTLY
Qty: 90 TABLET | Refills: 3 | Status: SHIPPED | OUTPATIENT
Start: 2025-01-25

## 2025-02-17 RX ORDER — ALBUTEROL SULFATE 90 UG/1
2 INHALANT RESPIRATORY (INHALATION) EVERY 6 HOURS PRN
Qty: 8.5 G | Refills: 3 | Status: SHIPPED | OUTPATIENT
Start: 2025-02-17

## 2025-02-25 DIAGNOSIS — R39.15 URINARY URGENCY: ICD-10-CM

## 2025-02-25 DIAGNOSIS — N32.81 OAB (OVERACTIVE BLADDER): ICD-10-CM

## 2025-02-25 RX ORDER — SOLIFENACIN SUCCINATE 5 MG/1
5 TABLET, FILM COATED ORAL
Qty: 30 TABLET | Refills: 11 | Status: SHIPPED | OUTPATIENT
Start: 2025-02-25

## 2025-03-01 DIAGNOSIS — K21.9 GASTROESOPHAGEAL REFLUX DISEASE, UNSPECIFIED WHETHER ESOPHAGITIS PRESENT: ICD-10-CM

## 2025-03-04 RX ORDER — FAMOTIDINE 40 MG/1
40 TABLET, FILM COATED ORAL NIGHTLY
Qty: 90 TABLET | Refills: 3 | Status: SHIPPED | OUTPATIENT
Start: 2025-03-04

## 2025-03-05 ENCOUNTER — LAB VISIT (OUTPATIENT)
Dept: LAB | Facility: HOSPITAL | Age: 45
End: 2025-03-05
Attending: NURSE PRACTITIONER
Payer: COMMERCIAL

## 2025-03-05 DIAGNOSIS — E11.9 TYPE 2 DIABETES MELLITUS WITHOUT COMPLICATION, WITHOUT LONG-TERM CURRENT USE OF INSULIN: ICD-10-CM

## 2025-03-05 DIAGNOSIS — Z84.0 FAMILY HISTORY OF LUPUS ERYTHEMATOSUS: ICD-10-CM

## 2025-03-05 DIAGNOSIS — Z00.00 LABORATORY EXAM ORDERED AS PART OF ROUTINE GENERAL MEDICAL EXAMINATION: ICD-10-CM

## 2025-03-05 LAB
ALBUMIN SERPL BCP-MCNC: 3.4 G/DL (ref 3.5–5.2)
ALP SERPL-CCNC: 156 U/L (ref 40–150)
ALT SERPL W/O P-5'-P-CCNC: 50 U/L (ref 10–44)
ANION GAP SERPL CALC-SCNC: 6 MMOL/L (ref 8–16)
AST SERPL-CCNC: 42 U/L (ref 10–40)
BASOPHILS # BLD AUTO: 0.04 K/UL (ref 0–0.2)
BASOPHILS NFR BLD: 0.7 % (ref 0–1.9)
BILIRUB SERPL-MCNC: 0.5 MG/DL (ref 0.1–1)
BUN SERPL-MCNC: 5 MG/DL (ref 6–20)
CALCIUM SERPL-MCNC: 9.2 MG/DL (ref 8.7–10.5)
CHLORIDE SERPL-SCNC: 104 MMOL/L (ref 95–110)
CHOLEST SERPL-MCNC: 117 MG/DL (ref 120–199)
CHOLEST/HDLC SERPL: 3.3 {RATIO} (ref 2–5)
CO2 SERPL-SCNC: 26 MMOL/L (ref 23–29)
CREAT SERPL-MCNC: 0.7 MG/DL (ref 0.5–1.4)
DIFFERENTIAL METHOD BLD: NORMAL
EOSINOPHIL # BLD AUTO: 0.1 K/UL (ref 0–0.5)
EOSINOPHIL NFR BLD: 2 % (ref 0–8)
ERYTHROCYTE [DISTWIDTH] IN BLOOD BY AUTOMATED COUNT: 12.2 % (ref 11.5–14.5)
EST. GFR  (NO RACE VARIABLE): >60 ML/MIN/1.73 M^2
ESTIMATED AVG GLUCOSE: 103 MG/DL (ref 68–131)
GLUCOSE SERPL-MCNC: 87 MG/DL (ref 70–110)
HBA1C MFR BLD: 5.2 % (ref 4–5.6)
HCT VFR BLD AUTO: 40.5 % (ref 37–48.5)
HDLC SERPL-MCNC: 35 MG/DL (ref 40–75)
HDLC SERPL: 29.9 % (ref 20–50)
HGB BLD-MCNC: 13 G/DL (ref 12–16)
IMM GRANULOCYTES # BLD AUTO: 0.01 K/UL (ref 0–0.04)
IMM GRANULOCYTES NFR BLD AUTO: 0.2 % (ref 0–0.5)
LDLC SERPL CALC-MCNC: 70 MG/DL (ref 63–159)
LYMPHOCYTES # BLD AUTO: 1.7 K/UL (ref 1–4.8)
LYMPHOCYTES NFR BLD: 27.3 % (ref 18–48)
MCH RBC QN AUTO: 30 PG (ref 27–31)
MCHC RBC AUTO-ENTMCNC: 32.1 G/DL (ref 32–36)
MCV RBC AUTO: 93 FL (ref 82–98)
MONOCYTES # BLD AUTO: 0.4 K/UL (ref 0.3–1)
MONOCYTES NFR BLD: 5.7 % (ref 4–15)
NEUTROPHILS # BLD AUTO: 4 K/UL (ref 1.8–7.7)
NEUTROPHILS NFR BLD: 64.1 % (ref 38–73)
NONHDLC SERPL-MCNC: 82 MG/DL
NRBC BLD-RTO: 0 /100 WBC
PLATELET # BLD AUTO: 341 K/UL (ref 150–450)
PMV BLD AUTO: 10 FL (ref 9.2–12.9)
POTASSIUM SERPL-SCNC: 5.4 MMOL/L (ref 3.5–5.1)
PROT SERPL-MCNC: 7 G/DL (ref 6–8.4)
RBC # BLD AUTO: 4.34 M/UL (ref 4–5.4)
SODIUM SERPL-SCNC: 136 MMOL/L (ref 136–145)
TRIGL SERPL-MCNC: 60 MG/DL (ref 30–150)
TSH SERPL DL<=0.005 MIU/L-ACNC: 0.45 UIU/ML (ref 0.4–4)
WBC # BLD AUTO: 6.15 K/UL (ref 3.9–12.7)

## 2025-03-05 PROCEDURE — 84443 ASSAY THYROID STIM HORMONE: CPT | Performed by: NURSE PRACTITIONER

## 2025-03-05 PROCEDURE — 86235 NUCLEAR ANTIGEN ANTIBODY: CPT | Mod: 59 | Performed by: NURSE PRACTITIONER

## 2025-03-05 PROCEDURE — 86039 ANTINUCLEAR ANTIBODIES (ANA): CPT | Performed by: NURSE PRACTITIONER

## 2025-03-05 PROCEDURE — 36415 COLL VENOUS BLD VENIPUNCTURE: CPT | Mod: PO | Performed by: NURSE PRACTITIONER

## 2025-03-05 PROCEDURE — 80053 COMPREHEN METABOLIC PANEL: CPT | Performed by: NURSE PRACTITIONER

## 2025-03-05 PROCEDURE — 85025 COMPLETE CBC W/AUTO DIFF WBC: CPT | Performed by: NURSE PRACTITIONER

## 2025-03-05 PROCEDURE — 83036 HEMOGLOBIN GLYCOSYLATED A1C: CPT | Performed by: NURSE PRACTITIONER

## 2025-03-05 PROCEDURE — 86038 ANTINUCLEAR ANTIBODIES: CPT | Performed by: NURSE PRACTITIONER

## 2025-03-05 PROCEDURE — 80061 LIPID PANEL: CPT | Performed by: NURSE PRACTITIONER

## 2025-03-06 ENCOUNTER — RESULTS FOLLOW-UP (OUTPATIENT)
Dept: FAMILY MEDICINE | Facility: CLINIC | Age: 45
End: 2025-03-06

## 2025-03-06 LAB
ANA PATTERN 1: NORMAL
ANA SER QL IF: POSITIVE
ANA TITR SER IF: NORMAL {TITER}

## 2025-03-07 LAB
ANTI SM ANTIBODY: 0.07 RATIO (ref 0–0.99)
ANTI SM/RNP ANTIBODY: 0.06 RATIO (ref 0–0.99)
ANTI-SM INTERPRETATION: NEGATIVE
ANTI-SM/RNP INTERPRETATION: NEGATIVE
ANTI-SSA ANTIBODY: 0.06 RATIO (ref 0–0.99)
ANTI-SSA INTERPRETATION: NEGATIVE
ANTI-SSB ANTIBODY: 0.07 RATIO (ref 0–0.99)
ANTI-SSB INTERPRETATION: NEGATIVE
DSDNA AB SER-ACNC: NORMAL [IU]/ML

## 2025-03-16 NOTE — PROGRESS NOTES
I have released your blood work.  We will review this in detail at our upcoming visit.  Have a great day.    Nohemi Trammell [facility]  [address]  [place] [address]  [phone]   (phone)  [phone] (fax)

## 2025-04-02 ENCOUNTER — PATIENT MESSAGE (OUTPATIENT)
Dept: FAMILY MEDICINE | Facility: CLINIC | Age: 45
End: 2025-04-02

## 2025-04-02 ENCOUNTER — OFFICE VISIT (OUTPATIENT)
Dept: FAMILY MEDICINE | Facility: CLINIC | Age: 45
End: 2025-04-02
Payer: COMMERCIAL

## 2025-04-02 VITALS
SYSTOLIC BLOOD PRESSURE: 110 MMHG | WEIGHT: 196.81 LBS | DIASTOLIC BLOOD PRESSURE: 70 MMHG | HEIGHT: 68 IN | HEART RATE: 90 BPM | BODY MASS INDEX: 29.83 KG/M2 | RESPIRATION RATE: 16 BRPM | OXYGEN SATURATION: 99 %

## 2025-04-02 DIAGNOSIS — I10 ESSENTIAL HYPERTENSION: ICD-10-CM

## 2025-04-02 DIAGNOSIS — J45.20 MILD INTERMITTENT ASTHMA WITHOUT COMPLICATION: ICD-10-CM

## 2025-04-02 DIAGNOSIS — E78.2 MIXED HYPERLIPIDEMIA: ICD-10-CM

## 2025-04-02 DIAGNOSIS — R12 HEARTBURN: ICD-10-CM

## 2025-04-02 DIAGNOSIS — D51.9 ANEMIA DUE TO VITAMIN B12 DEFICIENCY, UNSPECIFIED B12 DEFICIENCY TYPE: ICD-10-CM

## 2025-04-02 DIAGNOSIS — N32.81 OAB (OVERACTIVE BLADDER): ICD-10-CM

## 2025-04-02 DIAGNOSIS — F52.0 HYPOACTIVE SEXUAL DESIRE DISORDER: ICD-10-CM

## 2025-04-02 DIAGNOSIS — N95.2 VAGINAL ATROPHY: ICD-10-CM

## 2025-04-02 DIAGNOSIS — E87.5 HYPERKALEMIA: ICD-10-CM

## 2025-04-02 DIAGNOSIS — R79.89 ELEVATED LFTS: ICD-10-CM

## 2025-04-02 DIAGNOSIS — G47.33 OSA (OBSTRUCTIVE SLEEP APNEA): Primary | ICD-10-CM

## 2025-04-02 DIAGNOSIS — E11.9 TYPE 2 DIABETES MELLITUS WITHOUT COMPLICATION, WITHOUT LONG-TERM CURRENT USE OF INSULIN: ICD-10-CM

## 2025-04-02 DIAGNOSIS — N94.10 DYSPAREUNIA IN FEMALE: ICD-10-CM

## 2025-04-02 DIAGNOSIS — Z79.899 ENCOUNTER FOR LONG-TERM CURRENT USE OF MEDICATION: ICD-10-CM

## 2025-04-02 DIAGNOSIS — R39.15 URINARY URGENCY: ICD-10-CM

## 2025-04-02 DIAGNOSIS — K21.9 GASTROESOPHAGEAL REFLUX DISEASE, UNSPECIFIED WHETHER ESOPHAGITIS PRESENT: ICD-10-CM

## 2025-04-02 DIAGNOSIS — K59.09 CHRONIC CONSTIPATION: ICD-10-CM

## 2025-04-02 PROCEDURE — 3074F SYST BP LT 130 MM HG: CPT | Mod: CPTII,S$GLB,, | Performed by: NURSE PRACTITIONER

## 2025-04-02 PROCEDURE — 3008F BODY MASS INDEX DOCD: CPT | Mod: CPTII,S$GLB,, | Performed by: NURSE PRACTITIONER

## 2025-04-02 PROCEDURE — 3078F DIAST BP <80 MM HG: CPT | Mod: CPTII,S$GLB,, | Performed by: NURSE PRACTITIONER

## 2025-04-02 PROCEDURE — 3044F HG A1C LEVEL LT 7.0%: CPT | Mod: CPTII,S$GLB,, | Performed by: NURSE PRACTITIONER

## 2025-04-02 PROCEDURE — 1160F RVW MEDS BY RX/DR IN RCRD: CPT | Mod: CPTII,S$GLB,, | Performed by: NURSE PRACTITIONER

## 2025-04-02 PROCEDURE — G2211 COMPLEX E/M VISIT ADD ON: HCPCS | Mod: S$GLB,,, | Performed by: NURSE PRACTITIONER

## 2025-04-02 PROCEDURE — 99215 OFFICE O/P EST HI 40 MIN: CPT | Mod: S$GLB,,, | Performed by: NURSE PRACTITIONER

## 2025-04-02 PROCEDURE — 4010F ACE/ARB THERAPY RXD/TAKEN: CPT | Mod: CPTII,S$GLB,, | Performed by: NURSE PRACTITIONER

## 2025-04-02 PROCEDURE — 1159F MED LIST DOCD IN RCRD: CPT | Mod: CPTII,S$GLB,, | Performed by: NURSE PRACTITIONER

## 2025-04-02 RX ORDER — DEXLANSOPRAZOLE 60 MG/1
60 CAPSULE, DELAYED RELEASE ORAL DAILY
Qty: 90 CAPSULE | Refills: 3 | Status: SHIPPED | OUTPATIENT
Start: 2025-04-02

## 2025-04-02 RX ORDER — FLUCONAZOLE 150 MG/1
150 TABLET ORAL ONCE
Qty: 2 TABLET | Refills: 0 | Status: SHIPPED | OUTPATIENT
Start: 2025-04-02 | End: 2025-04-02

## 2025-04-02 RX ORDER — MONTELUKAST SODIUM 10 MG/1
10 TABLET ORAL NIGHTLY
Qty: 90 TABLET | Refills: 3 | Status: SHIPPED | OUTPATIENT
Start: 2025-04-02

## 2025-04-02 RX ORDER — ATORVASTATIN CALCIUM 20 MG/1
20 TABLET, FILM COATED ORAL NIGHTLY
Qty: 90 TABLET | Refills: 3 | Status: SHIPPED | OUTPATIENT
Start: 2025-04-02

## 2025-04-02 RX ORDER — METOPROLOL SUCCINATE 25 MG/1
12.5 TABLET, EXTENDED RELEASE ORAL DAILY
Qty: 45 TABLET | Refills: 3 | Status: SHIPPED | OUTPATIENT
Start: 2025-04-02 | End: 2026-04-02

## 2025-04-02 RX ORDER — ESTRADIOL 0.05 MG/D
1 FILM, EXTENDED RELEASE TRANSDERMAL
Qty: 24 PATCH | Refills: 3 | Status: SHIPPED | OUTPATIENT
Start: 2025-04-03 | End: 2026-04-03

## 2025-04-02 RX ORDER — POTASSIUM CHLORIDE 750 MG/1
20 TABLET, EXTENDED RELEASE ORAL DAILY
Qty: 180 TABLET | Refills: 3 | Status: SHIPPED | OUTPATIENT
Start: 2025-04-02

## 2025-04-02 RX ORDER — FAMOTIDINE 40 MG/1
40 TABLET, FILM COATED ORAL NIGHTLY
Qty: 90 TABLET | Refills: 3 | Status: SHIPPED | OUTPATIENT
Start: 2025-04-02

## 2025-04-02 RX ORDER — DULOXETIN HYDROCHLORIDE 60 MG/1
120 CAPSULE, DELAYED RELEASE ORAL DAILY
Qty: 180 CAPSULE | Refills: 3 | Status: SHIPPED | OUTPATIENT
Start: 2025-04-02

## 2025-04-02 RX ORDER — SOLIFENACIN SUCCINATE 5 MG/1
5 TABLET, FILM COATED ORAL DAILY
Qty: 90 TABLET | Refills: 3 | Status: SHIPPED | OUTPATIENT
Start: 2025-04-02

## 2025-04-02 RX ORDER — CYANOCOBALAMIN 1000 UG/ML
1000 INJECTION, SOLUTION INTRAMUSCULAR; SUBCUTANEOUS
Qty: 10 ML | Refills: 1 | Status: SHIPPED | OUTPATIENT
Start: 2025-04-02

## 2025-04-03 ENCOUNTER — TELEPHONE (OUTPATIENT)
Dept: FAMILY MEDICINE | Facility: CLINIC | Age: 45
End: 2025-04-03
Payer: COMMERCIAL

## 2025-04-03 NOTE — PROGRESS NOTES
CHIEF COMPLAINT:      Patient presents today for follow up of multiple medical conditions.      SLEEP APNEA:  She uses CPAP but reports facial discomfort with mask despite downsizing. She is unable to use nasal pillows due to anatomical nasal structure preventing proper fit. She is interested in exploring newer CPAP options or alternative mask types.    CHRONIC PAIN:    She has had an intrathecal pain pump for 8 years with recent dosage increases over the past 3-4 months without improvement in pain relief. She recently received epidural injection at L3-L4.    LIVER DISEASE:    She has been under hepatology care since 2020 with follow-up every six months. Recent Fiberscan showed progression to stage 3 fibrosis from stage 2 fibrosis found on biopsy in 2021, despite significant weight loss of over 100 lbs.      HTN  On lisinopril 2.5 mg daily and toprol 12.5 mg  Hx MVP        DM  On moujaro  Lab Results   Component Value Date    HGBA1C 5.2 03/05/2025       ROS    General: -fever, -chills, +fatigue, -weight gain, -weight loss  Eyes: -vision changes, -redness, -discharge  ENT: -ear pain, -nasal congestion, -sore throat  Cardiovascular: -chest pain, +palpitations, -lower extremity edema  Respiratory: -cough, -shortness of breath  Gastrointestinal: -abdominal pain, -nausea, -vomiting, -diarrhea, -constipation, -blood in stool  Genitourinary: -dysuria, -hematuria, -frequency  Musculoskeletal: +joint pain, -muscle pain, +pain with movement, +back pain  Skin: -rash, -lesion, +skin redness  Neurological: -headache, +dizziness, -numbness, -tingling  Psychiatric: -anxiety, -depression, -sleep difficulty  Female Genitourinary: +vaginal itching or burning, +vaginal discharge  Objective    Vitals: Weight: 196 lbs. Blood pressure: 110/70.   General: No acute distress. Well-developed. Well-nourished.  Eyes: . Sclerae anicteric.  HENT: Normocephalic. Atraumatic.   Cardiovascular: Regular rate. Regular rhythm. No murmurs. No rubs. No  gallops. Normal S1, S2.  Respiratory: Normal respiratory effort. Clear to auscultation bilaterally. No rales. No rhonchi. No wheezing.  Musculoskeletal: No  obvious deformity.  Extremities: No lower extremity edema.  Neurological: Alert & oriented x3. No slurred speech. Normal gait.  Psychiatric: Normal mood. Normal affect. Good insight. Good judgment.  Skin: Warm. Dry.      Assessment/Plan  April was seen today for follow-up.    Diagnoses and all orders for this visit:    REUBEN (obstructive sleep apnea)  -     Ambulatory referral/consult to Pulmonology; Future    Hyperkalemia  Recheck cmp in one month, if still elevated will adjust oral potassium  Pt has been on this medication for years without potassium elevation, normal kidney function, will recheck    Elevated LFTs  -     Comprehensive Metabolic Panel; Future    Type 2 diabetes mellitus without complication, without long-term current use of insulin  -     Discontinue: tirzepatide 10 mg/0.5 mL PnIj; Inject 10 mg into the skin every 7 days.  -     tirzepatide 10 mg/0.5 mL PnIj; Inject 10 mg into the skin every 7 days.    Mixed hyperlipidemia  -     atorvastatin (LIPITOR) 20 MG tablet; Take 1 tablet (20 mg total) by mouth every evening.    Anemia due to vitamin B12 deficiency, unspecified B12 deficiency type  -     cyanocobalamin 1,000 mcg/mL injection; Inject 1 mL (1,000 mcg total) into the muscle every 30 days.    Heartburn  -     dexlansoprazole (DEXILANT) 60 mg capsule; Take 1 capsule (60 mg total) by mouth once daily.    Hypoactive sexual desire disorder  -     estradiol 0.05 mg/24 hr td ptsw (VIVELLE-DOT) 0.05 mg/24 hr; Place 1 patch onto the skin twice a week.    Dyspareunia in female  -     estradiol 0.05 mg/24 hr td ptsw (VIVELLE-DOT) 0.05 mg/24 hr; Place 1 patch onto the skin twice a week.    Vaginal atrophy  -     estradiol 0.05 mg/24 hr td ptsw (VIVELLE-DOT) 0.05 mg/24 hr; Place 1 patch onto the skin twice a week.    Gastroesophageal reflux disease,  unspecified whether esophagitis present  -     famotidine (PEPCID) 40 MG tablet; Take 1 tablet (40 mg total) by mouth every evening.    Chronic constipation  -     linaCLOtide (LINZESS) 290 mcg Cap capsule; Take 1 capsule (290 mcg total) by mouth once daily. Take >30 minutes before 1st meal    Essential hypertension  -     metoprolol succinate (TOPROL-XL) 25 MG 24 hr tablet; Take 0.5 tablets (12.5 mg total) by mouth once daily.  Stop lisinopril secondary to low BPs and dizziness when changing positions.    Mild intermittent asthma without complication  -     montelukast (SINGULAIR) 10 mg tablet; Take 1 tablet (10 mg total) by mouth every evening.    OAB (overactive bladder)  -     solifenacin (VESICARE) 5 MG tablet; Take 1 tablet (5 mg total) by mouth once daily.    Urinary urgency  -     solifenacin (VESICARE) 5 MG tablet; Take 1 tablet (5 mg total) by mouth once daily.    Encounter for long-term current use of medication  -     Comprehensive Metabolic Panel; Future  -     Vitamin D; Future  -     Vitamin B12; Future    Other orders  -     fluconazole (DIFLUCAN) 150 MG Tab; Take 1 tablet (150 mg total) by mouth once. May repeat in one week if needed for 1 dose  -     DULoxetine (CYMBALTA) 60 MG capsule; Take 2 capsules (120 mg total) by mouth once daily.  -     potassium chloride (KLOR-CON) 10 MEQ TbSR; Take 2 tablets (20 mEq total) by mouth once daily.    Reviewed all labs in clinic  - Positive VEDA (1:160) noted, but other antibody tests negative for lupus, Sjögren's, and mixed connective tissue disorder.  - Monitored liver function and potassium levels due to previous abnormalities.  - Evaluated BP management given recent weight loss and low readings.  - Assessed sleep apnea treatment efficacy and considered alternatives.    FOLLOW-UP:  - Patient to continue current weight loss efforts.  F/u 3 months

## 2025-04-03 NOTE — TELEPHONE ENCOUNTER
----- Message from Noemi Trammell NP sent at 4/2/2025  6:29 PM CDT -----  Can you schedule labs in 1 month and f/u with me in 3 months

## 2025-05-13 ENCOUNTER — PATIENT OUTREACH (OUTPATIENT)
Dept: ADMINISTRATIVE | Facility: HOSPITAL | Age: 45
End: 2025-05-13
Payer: COMMERCIAL

## 2025-05-13 DIAGNOSIS — E11.9 TYPE 2 DIABETES MELLITUS WITHOUT COMPLICATION, WITHOUT LONG-TERM CURRENT USE OF INSULIN: Primary | ICD-10-CM

## 2025-05-14 ENCOUNTER — LAB VISIT (OUTPATIENT)
Dept: LAB | Facility: HOSPITAL | Age: 45
End: 2025-05-14
Attending: NURSE PRACTITIONER
Payer: COMMERCIAL

## 2025-05-14 ENCOUNTER — PATIENT MESSAGE (OUTPATIENT)
Dept: OBSTETRICS AND GYNECOLOGY | Facility: CLINIC | Age: 45
End: 2025-05-14

## 2025-05-14 ENCOUNTER — OFFICE VISIT (OUTPATIENT)
Dept: OBSTETRICS AND GYNECOLOGY | Facility: CLINIC | Age: 45
End: 2025-05-14
Payer: COMMERCIAL

## 2025-05-14 VITALS — BODY MASS INDEX: 29.5 KG/M2 | SYSTOLIC BLOOD PRESSURE: 104 MMHG | DIASTOLIC BLOOD PRESSURE: 76 MMHG | WEIGHT: 194 LBS

## 2025-05-14 DIAGNOSIS — Z12.31 BREAST CANCER SCREENING BY MAMMOGRAM: ICD-10-CM

## 2025-05-14 DIAGNOSIS — Z90.710 S/P HYSTERECTOMY WITH OOPHORECTOMY: ICD-10-CM

## 2025-05-14 DIAGNOSIS — N94.10 DYSPAREUNIA IN FEMALE: ICD-10-CM

## 2025-05-14 DIAGNOSIS — F52.0 HYPOACTIVE SEXUAL DESIRE DISORDER: ICD-10-CM

## 2025-05-14 DIAGNOSIS — Z79.890 HORMONE REPLACEMENT THERAPY (HRT): Primary | ICD-10-CM

## 2025-05-14 DIAGNOSIS — Z90.721 S/P HYSTERECTOMY WITH OOPHORECTOMY: ICD-10-CM

## 2025-05-14 DIAGNOSIS — R79.89 ELEVATED LFTS: ICD-10-CM

## 2025-05-14 DIAGNOSIS — Z79.899 ENCOUNTER FOR LONG-TERM CURRENT USE OF MEDICATION: ICD-10-CM

## 2025-05-14 LAB
25(OH)D3+25(OH)D2 SERPL-MCNC: 43 NG/ML (ref 30–96)
ALBUMIN SERPL BCP-MCNC: 3.5 G/DL (ref 3.5–5.2)
ALP SERPL-CCNC: 213 UNIT/L (ref 40–150)
ALT SERPL W/O P-5'-P-CCNC: 55 UNIT/L (ref 10–44)
ANION GAP (OHS): 5 MMOL/L (ref 8–16)
AST SERPL-CCNC: 40 UNIT/L (ref 11–45)
BILIRUB SERPL-MCNC: 0.5 MG/DL (ref 0.1–1)
BUN SERPL-MCNC: 8 MG/DL (ref 6–20)
CALCIUM SERPL-MCNC: 9.4 MG/DL (ref 8.7–10.5)
CHLORIDE SERPL-SCNC: 105 MMOL/L (ref 95–110)
CO2 SERPL-SCNC: 30 MMOL/L (ref 23–29)
CREAT SERPL-MCNC: 0.7 MG/DL (ref 0.5–1.4)
GFR SERPLBLD CREATININE-BSD FMLA CKD-EPI: >60 ML/MIN/1.73/M2
GLUCOSE SERPL-MCNC: 82 MG/DL (ref 70–110)
POTASSIUM SERPL-SCNC: 5.3 MMOL/L (ref 3.5–5.1)
PROT SERPL-MCNC: 7 GM/DL (ref 6–8.4)
SODIUM SERPL-SCNC: 140 MMOL/L (ref 136–145)
VIT B12 SERPL-MCNC: 741 PG/ML (ref 210–950)

## 2025-05-14 PROCEDURE — 99999 PR PBB SHADOW E&M-EST. PATIENT-LVL V: CPT | Mod: PBBFAC,,, | Performed by: OBSTETRICS & GYNECOLOGY

## 2025-05-14 PROCEDURE — 1159F MED LIST DOCD IN RCRD: CPT | Mod: CPTII,S$GLB,, | Performed by: OBSTETRICS & GYNECOLOGY

## 2025-05-14 PROCEDURE — 3078F DIAST BP <80 MM HG: CPT | Mod: CPTII,S$GLB,, | Performed by: OBSTETRICS & GYNECOLOGY

## 2025-05-14 PROCEDURE — 3008F BODY MASS INDEX DOCD: CPT | Mod: CPTII,S$GLB,, | Performed by: OBSTETRICS & GYNECOLOGY

## 2025-05-14 PROCEDURE — 82306 VITAMIN D 25 HYDROXY: CPT

## 2025-05-14 PROCEDURE — 3074F SYST BP LT 130 MM HG: CPT | Mod: CPTII,S$GLB,, | Performed by: OBSTETRICS & GYNECOLOGY

## 2025-05-14 PROCEDURE — 80053 COMPREHEN METABOLIC PANEL: CPT

## 2025-05-14 PROCEDURE — 82607 VITAMIN B-12: CPT

## 2025-05-14 PROCEDURE — 3044F HG A1C LEVEL LT 7.0%: CPT | Mod: CPTII,S$GLB,, | Performed by: OBSTETRICS & GYNECOLOGY

## 2025-05-14 PROCEDURE — 4010F ACE/ARB THERAPY RXD/TAKEN: CPT | Mod: CPTII,S$GLB,, | Performed by: OBSTETRICS & GYNECOLOGY

## 2025-05-14 PROCEDURE — 99214 OFFICE O/P EST MOD 30 MIN: CPT | Mod: S$GLB,,, | Performed by: OBSTETRICS & GYNECOLOGY

## 2025-05-14 PROCEDURE — 36415 COLL VENOUS BLD VENIPUNCTURE: CPT | Mod: PO

## 2025-05-14 RX ORDER — ESTRADIOL 10 UG/1
1 TABLET, FILM COATED VAGINAL
Qty: 24 TABLET | Refills: 3 | Status: SHIPPED | OUTPATIENT
Start: 2025-05-15 | End: 2025-05-16

## 2025-05-14 RX ORDER — ATOGEPANT 60 MG/1
1 TABLET ORAL
COMMUNITY
Start: 2025-05-13

## 2025-05-14 RX ORDER — ESTRADIOL 0.07 MG/D
1 FILM, EXTENDED RELEASE TRANSDERMAL
Qty: 24 PATCH | Refills: 3 | Status: SHIPPED | OUTPATIENT
Start: 2025-05-15 | End: 2026-05-15

## 2025-05-14 NOTE — PROGRESS NOTES
Chief Complaint   Patient presents with    Follow-up     6 month       History of Present Illness: Miri Earl is a 44 y.o. female that presents today 2025 for   Chief Complaint   Patient presents with    Follow-up     6 month     She reports painful intercourse.  She reports no libido and no desires for intercourse.  She reports break through hot flashes.     Past Medical History:   Diagnosis Date    Anxiety     Arthritis     Asthma     Breast cyst     Chronic back pain     Colon polyp     Depression     Diabetes mellitus     Elevated liver enzymes     Fibrocystic breast     Fibromyalgia     GERD (gastroesophageal reflux disease)     History of Chiari malformation     History of colitis     History of gastritis     Hypertension     Hypokalemia     IBS (irritable bowel syndrome)     Insomnia     MVP (mitral valve prolapse)     HERNANDEZ (nonalcoholic steatohepatitis) 2021    Neuropathy     Postmenopausal HRT (hormone replacement therapy)     Seasonal allergies     Sleep apnea with use of continuous positive airway pressure (CPAP)     Spondylitis     Thyroid nodule        Past Surgical History:   Procedure Laterality Date    APPENDECTOMY  10/15/2009  [facility]    BACK SURGERY      BACK SURGERY      xs 4    BACK SURGERY       SECTION      x 3    CHOLECYSTECTOMY      COLONOSCOPY    [place]    COLONOSCOPY  08/15/2014    Dr. Colunga: 1 colon polyp removed, hemorrhoids, Mild colonic spasm consistent with irritable bowel syndrome; repeat in 5 years for surveillance; biopsy: TUBULAR ADENOMA.    COLONOSCOPY N/A 10/21/2020    Procedure: COLONOSCOPY;  Surgeon: Earl Colunga Jr., MD; 2 colon polyps removed, hemorrhoids, Mild colonic spasm consistent with irritable bowel syndrome. repeat in 5 years for surveillance; biopsy: polyps- TUBULAR ADENOMA & hyperplastic polyp; random TI & colon biopsies WNL    COLONOSCOPY W/ POLYPECTOMY  10/31/2008  [facility]    2 mm polyp in the sigmoid colon, TUBULAR ADENOMATOUS POLYP.   Erythematous mucosa rectum, proctitis (prep  proctitis?). Irritable bowel syndrome.     ESOPHAGOGASTRODUODENOSCOPY  08/15/2014    Dr. Colunga: Reflux esophagitis, No endoscopic esophageal abnormality to explain patient's dysphagia. Esophagus dilated, 54FR, history/examination was suspicious for an esophageal spasm; gastritis; biopsy: stomach- unremarkable, bishop test negative    ESOPHAGOGASTRODUODENOSCOPY N/A 10/21/2020    Surgeon: Earl Colunga Jr., MD;  Location: [facility]; Reflux esophagitis; Gastritis. Enlarged gastric folds; No endoscopic esophageal abnormality to explain patient's dysphagia. Esophagus dilated, 51FR; biopsy: duodenum WNL; stomach- REACTIVE GASTRITIS, negative H pylori    ESOPHAGOGASTRODUODENOSCOPY N/A 05/04/2022    Procedure: EGD (ESOPHAGOGASTRODUODENOSCOPY);  Surgeon: Earl Colunga Jr., MD;  Location: [facility];  Service: Endoscopy;  Laterality: N/A; Slight antritis, esophageal dilation performed; biopsy: stomach- mild chronic inactive gastritis with focal intestinal metaplasia (complete type), negative for dysplasia; negative for h pylori    ESOPHAGOGASTRODUODENOSCOPY N/A 7/2/2024    Procedure: EGD (ESOPHAGOGASTRODUODENOSCOPY);  Surgeon: Earl Colunga Jr., MD;  Location: [facility];  Service: Endoscopy;  Laterality: N/A;    GANGLION CYST EXCISION Right     HYSTERECTOMY  10/2007  [facility]    LIVER BIOPSY  10/20/2008    Mild portal mixed leukocytic infiltrate,neutrophils and lymphocytes.  Bile ducts seen, negative for leukocytic infiltration.  Negative for steatohepatitis. Mild sinusoidal dilatation and congestion. Negative for granulomata nor malignancy.  Reticulum stain negative for hepaticfibrosis.  Negative iron stain.  PAS stain, negative for PAS-positive inclusions.    LUMBAR DISCECTOMY      LUMBAR FUSION      MYELOGRAPHY N/A 01/07/2019    Procedure: Myelogram lumbar and thoracic;  Surgeon: Hari Rich MD;  Location: [facility];  Service: Radiology;  Laterality: N/A;    NECK SURGERY       neurostimulator removal       OOPHORECTOMY      pain pump      PELVIC LAPAROSCOPY      SELECTIVE INJECTION OF ANESTHETIC AGENT AROUND LUMBAR SPINAL NERVE ROOT BY TRANSFORAMINAL APPROACH Bilateral 2019    Procedure: BLOCK, SPINAL NERVE ROOT, LUMBAR, SELECTIVE, TRANSFORAMINAL APPROACH; L3;  Surgeon: Tez Edouard MD;  Location: [facility];  Service: Pain Management;  Laterality: Bilateral;    SPINAL CORD STIMULATOR IMPLANT      UPPER GASTROINTESTINAL ENDOSCOPY  10/31/2008  [facility]    Normal esophagus. Lax LES, non-erosive esophageal reflux (NERD?) Erythematous gastropathy on greater curve.  PRABHU Test performed on 10/31/08 was Negative.        Medications Prior to Visit[1]    Review of patient's allergies indicates:   Allergen Reactions    Doxycycline Anaphylaxis and Other (See Comments)    Iodinated contrast media Anaphylaxis, Hives and Other (See Comments)    Latex, natural rubber Anaphylaxis, Other (See Comments) and Shortness Of Breath     Also rash and itching      Shellfish containing products Anaphylaxis    Povidone-iodine Hives       Family History   Problem Relation Name Age of Onset    Diabetes Father      Hypertension Father      Diabetes Mother      Hypertension Mother      Colon cancer Neg Hx      Crohn's disease Neg Hx      Ulcerative colitis Neg Hx      Stomach cancer Neg Hx      Esophageal cancer Neg Hx      Cirrhosis Neg Hx      Breast cancer Neg Hx         Social History     Tobacco Use    Smoking status: Every Day     Types: Cigarettes, Vaping with nicotine     Start date: 2023    Smokeless tobacco: Never    Tobacco comments:     2 cigarettes a day   Substance Use Topics    Alcohol use: Not Currently     Social History     Substance and Sexual Activity   Sexual Activity Not on file       OB History    Para Term  AB Living   4 4 4      SAB IAB Ectopic Multiple Live Births             # Outcome Date GA Lbr Smith/2nd Weight Sex Type Anes PTL Lv   4 Term            3 Term        "     2 Term            1 Term                  /76   Wt 88 kg (194 lb 0.1 oz)   Body mass index is 29.5 kg/m².      ASSESSMENT/PLAN:  Hormone replacement therapy (HRT)  Comments:  increase from 0.05 today  Orders:  -     estradioL (VIVELLE-DOT) 0.075 mg/24 hr; Place 1 patch onto the skin twice a week.  Dispense: 24 patch; Refill: 3    S/P hysterectomy with oophorectomy    Hypoactive sexual desire disorder  -     estradioL (VAGIFEM) 10 mcg Tab; Place 1 tablet (10 mcg total) vaginally twice a week.  Dispense: 24 tablet; Refill: 3    Dyspareunia in female  -     estradioL (VAGIFEM) 10 mcg Tab; Place 1 tablet (10 mcg total) vaginally twice a week.  Dispense: 24 tablet; Refill: 3    Breast cancer screening by mammogram  -     Mammo Digital Screening Bilat w/ Inder (XPD); Future; Expected date: 05/14/2025      30 minutes spent today spent preparing reviewing previous external notes, reviewing previous results, performing medical examination, ordering tests or medications, counseling and documenting.            [1]   Outpatient Medications Prior to Visit   Medication Sig Dispense Refill    albuterol (PROVENTIL/VENTOLIN HFA) 90 mcg/actuation inhaler INHALE 2 PUFFS BY MOUTH EVERY 6 HOURS AS NEEDED FOR WHEEZING 8.5 g 3    atorvastatin (LIPITOR) 20 MG tablet Take 1 tablet (20 mg total) by mouth every evening. 90 tablet 3    BD LUER-KATHE SYRINGE 3 mL 25 x 1 1/2 " Syrg USE TO INJECT B12 INJECTION AS DIRECTED      budesonide (PULMICORT) 0.5 mg/2 mL nebulizer solution USE 2 ML(0.5 MG) VIA NEBULIZER TWICE DAILY AS NEEDED 60 mL 1    cetirizine (ZYRTEC) 10 MG tablet Take 1 tablet (10 mg total) by mouth once daily. 90 tablet 3    cholecalciferol, vitamin D3, (VITAMIN D3) 50 mcg (2,000 unit) Cap capsule       cyanocobalamin 1,000 mcg/mL injection Inject 1 mL (1,000 mcg total) into the muscle every 30 days. 10 mL 1    dexlansoprazole (DEXILANT) 60 mg capsule Take 1 capsule (60 mg total) by mouth once daily. 90 capsule 3    " DULoxetine (CYMBALTA) 60 MG capsule Take 2 capsules (120 mg total) by mouth once daily. 180 capsule 3    dupilumab (DUPIXENT PEN SUBQ)       EPINEPHrine (EPIPEN) 0.3 mg/0.3 mL AtIn INJECT 0.3 ML IN THE MUSCLE AS NEEDED 2 each 2    famotidine (PEPCID) 40 MG tablet Take 1 tablet (40 mg total) by mouth every evening. 90 tablet 3    gabapentin (NEURONTIN) 600 MG tablet TAKE 2 TABLETS(1200 MG) BY MOUTH THREE TIMES DAILY 540 tablet 1    linaCLOtide (LINZESS) 290 mcg Cap capsule Take 1 capsule (290 mcg total) by mouth once daily. Take >30 minutes before 1st meal 90 capsule 3    metoprolol succinate (TOPROL-XL) 25 MG 24 hr tablet Take 0.5 tablets (12.5 mg total) by mouth once daily. 45 tablet 3    montelukast (SINGULAIR) 10 mg tablet Take 1 tablet (10 mg total) by mouth every evening. 90 tablet 3    morphine sulfate (MORPHINE, BULK, MISC) by Misc.(Non-Drug; Combo Route) route.      naloxone (NARCAN) 4 mg/actuation Spry SMARTSIG:Both Nares      ondansetron (ZOFRAN) 8 MG tablet Take 1 tablet (8 mg total) by mouth every 8 (eight) hours. 6 tablet 1    potassium chloride (KLOR-CON) 10 MEQ TbSR Take 2 tablets (20 mEq total) by mouth once daily. 180 tablet 3    promethazine (PHENERGAN) 25 MG tablet Take 25 mg by mouth every 8 (eight) hours as needed.      pyridoxine, vitamin B6, (VITAMIN B-6) 100 MG Tab       QULIPTA 60 mg Tab Take 1 tablet by mouth.      sodium chloride 0.9% 0.9 % SolP with bupivacaine 0.5% (5 mg/ml) 0.5 % (5 mg/mL) Soln 0.1 % by Epidural route continuous.      solifenacin (VESICARE) 5 MG tablet Take 1 tablet (5 mg total) by mouth once daily. 90 tablet 3    tirzepatide 10 mg/0.5 mL PnIj Inject 10 mg into the skin every 7 days. 6 mL 3    estradiol 0.05 mg/24 hr td ptsw (VIVELLE-DOT) 0.05 mg/24 hr Place 1 patch onto the skin twice a week. 24 patch 3    levalbuterol (XOPENEX) 1.25 mg/3 mL nebulizer solution Take 3 mLs (1.25 mg total) by nebulization every 4 (four) hours as needed for Wheezing. Rescue 30 mL 1     azelastine (ASTELIN) 137 mcg (0.1 %) nasal spray 1 spray (137 mcg total) by Nasal route 2 (two) times daily. 30 mL 5    oxyCODONE (ROXICODONE) 10 mg Tab immediate release tablet TAKE 1 TABLET BY MOUTH EVERY 6 HOURS FOR 3 DAYS (Patient not taking: Reported on 5/14/2025)      resmetirom (REZDIFFRA) 80 mg Tab Take 80 mg by mouth once daily. 30 tablet 11     No facility-administered medications prior to visit.

## 2025-05-16 RX ORDER — ESTRADIOL 10 UG/1
1 TABLET, FILM COATED VAGINAL
Qty: 24 TABLET | Refills: 3 | Status: SHIPPED | OUTPATIENT
Start: 2025-05-19 | End: 2025-05-16 | Stop reason: SDUPTHER

## 2025-05-16 RX ORDER — ESTRADIOL 10 UG/1
1 TABLET, FILM COATED VAGINAL
Qty: 8 TABLET | Refills: 3 | Status: SHIPPED | OUTPATIENT
Start: 2025-05-19

## 2025-05-18 ENCOUNTER — PATIENT MESSAGE (OUTPATIENT)
Dept: FAMILY MEDICINE | Facility: CLINIC | Age: 45
End: 2025-05-18
Payer: COMMERCIAL

## 2025-05-18 DIAGNOSIS — R74.8 ELEVATED ALKALINE PHOSPHATASE LEVEL: Primary | ICD-10-CM

## 2025-05-18 NOTE — TELEPHONE ENCOUNTER
Alkaline phosphatase was elevated. This is nonspecific so I would like to order alkaline isoenzymes. This is a blood test, please schedule.

## 2025-05-20 ENCOUNTER — TELEPHONE (OUTPATIENT)
Dept: OBSTETRICS AND GYNECOLOGY | Facility: CLINIC | Age: 45
End: 2025-05-20
Payer: COMMERCIAL

## 2025-05-20 NOTE — TELEPHONE ENCOUNTER
----- Message from Suzanna sent at 5/20/2025 10:43 AM CDT -----  Regarding: Needs return call  Type: Needs Medical AdviceWho Called:  [facility] name and phone #:  [facility] - [place], [place] - [address] [place] [id number] [place] [address] [address]Shaye: [phone] Fax: [phone] Call Back Number: [phone] Information: needs clarification on medication estradiol insert, needs to verify is this is replacing thre patch and tablet, please call to confirm

## 2025-05-26 ENCOUNTER — PATIENT OUTREACH (OUTPATIENT)
Dept: ADMINISTRATIVE | Facility: HOSPITAL | Age: 45
End: 2025-05-26
Payer: COMMERCIAL

## 2025-05-26 NOTE — PROGRESS NOTES
Population Health Chart Review & Patient Outreach Details      Additional [facility] Notes:               Updates Requested / Reviewed:      Updated Care Coordination Note and Immunizations Reconciliation Completed or Queried: [place]         Health Maintenance Topics Overdue:      [facility] Score: 3     Urine Screening  Eye Exam  Foot Exam                       Health Maintenance Topic(s) Outreach Outcomes & Actions Taken:    Eye Exam - Outreach Outcomes & Actions Taken  : External Records Requested & Care Team Updated if Applicable

## 2025-05-27 ENCOUNTER — LAB VISIT (OUTPATIENT)
Dept: LAB | Facility: HOSPITAL | Age: 45
End: 2025-05-27
Attending: NURSE PRACTITIONER
Payer: COMMERCIAL

## 2025-05-27 DIAGNOSIS — R74.8 ELEVATED ALKALINE PHOSPHATASE LEVEL: ICD-10-CM

## 2025-05-27 PROCEDURE — 36415 COLL VENOUS BLD VENIPUNCTURE: CPT | Mod: PO

## 2025-05-27 PROCEDURE — 84075 ASSAY ALKALINE PHOSPHATASE: CPT

## 2025-05-28 RX ORDER — ALBUTEROL SULFATE 90 UG/1
2 INHALANT RESPIRATORY (INHALATION) EVERY 6 HOURS PRN
Qty: 8.5 G | Refills: 3 | Status: SHIPPED | OUTPATIENT
Start: 2025-05-28

## 2025-06-05 LAB
W ALKALINE PHOSPHATASE: 163 U/L
W BONE ISOENZYME: 37 %
W INTERPRETATION ALKALINE PHOSPHATASE ISOENZYMES: ABNORMAL
W INTESTINE ISOENZYME: 4 %
W LIVER ISOENZYME: 59 %
W MACROHEPATIC ISOENZYMES: ABNORMAL
W PLACENTAL ISOENZYME: 0 %

## 2025-06-09 ENCOUNTER — RESULTS FOLLOW-UP (OUTPATIENT)
Dept: FAMILY MEDICINE | Facility: CLINIC | Age: 45
End: 2025-06-09

## 2025-06-20 ENCOUNTER — HOSPITAL ENCOUNTER (OUTPATIENT)
Dept: RADIOLOGY | Facility: HOSPITAL | Age: 45
Discharge: HOME | End: 2025-06-20
Attending: OBSTETRICS & GYNECOLOGY
Payer: COMMERCIAL

## 2025-06-20 DIAGNOSIS — Z12.31 BREAST CANCER SCREENING BY MAMMOGRAM: ICD-10-CM

## 2025-06-20 PROCEDURE — 77067 SCR MAMMO BI INCL CAD: CPT | Mod: 26,,, | Performed by: RADIOLOGY

## 2025-06-20 PROCEDURE — 77063 BREAST TOMOSYNTHESIS BI: CPT | Mod: TC,PN

## 2025-06-20 PROCEDURE — 77063 BREAST TOMOSYNTHESIS BI: CPT | Mod: 26,,, | Performed by: RADIOLOGY

## 2025-06-21 ENCOUNTER — RESULTS FOLLOW-UP (OUTPATIENT)
Dept: OBSTETRICS AND GYNECOLOGY | Facility: CLINIC | Age: 45
End: 2025-06-21

## 2025-07-16 DIAGNOSIS — F52.0 HYPOACTIVE SEXUAL DESIRE DISORDER: ICD-10-CM

## 2025-07-16 DIAGNOSIS — N94.10 DYSPAREUNIA IN FEMALE: ICD-10-CM

## 2025-07-16 RX ORDER — ESTRADIOL 10 UG/1
TABLET, FILM COATED VAGINAL
Qty: 24 TABLET | Refills: 3 | Status: SHIPPED | OUTPATIENT
Start: 2025-07-16

## 2025-08-07 ENCOUNTER — OFFICE VISIT (OUTPATIENT)
Dept: FAMILY MEDICINE | Facility: CLINIC | Age: 45
End: 2025-08-07
Payer: COMMERCIAL

## 2025-08-07 ENCOUNTER — PATIENT MESSAGE (OUTPATIENT)
Dept: FAMILY MEDICINE | Facility: CLINIC | Age: 45
End: 2025-08-07

## 2025-08-07 VITALS
TEMPERATURE: 98 F | SYSTOLIC BLOOD PRESSURE: 110 MMHG | HEIGHT: 68 IN | BODY MASS INDEX: 26.66 KG/M2 | HEART RATE: 100 BPM | OXYGEN SATURATION: 99 % | DIASTOLIC BLOOD PRESSURE: 62 MMHG | WEIGHT: 175.94 LBS | RESPIRATION RATE: 18 BRPM

## 2025-08-07 DIAGNOSIS — E78.2 MIXED HYPERLIPIDEMIA: ICD-10-CM

## 2025-08-07 DIAGNOSIS — E11.9 TYPE 2 DIABETES MELLITUS WITHOUT COMPLICATION, WITHOUT LONG-TERM CURRENT USE OF INSULIN: Primary | ICD-10-CM

## 2025-08-07 PROCEDURE — 4010F ACE/ARB THERAPY RXD/TAKEN: CPT | Mod: CPTII,S$GLB,, | Performed by: NURSE PRACTITIONER

## 2025-08-07 PROCEDURE — G2211 COMPLEX E/M VISIT ADD ON: HCPCS | Mod: S$GLB,,, | Performed by: NURSE PRACTITIONER

## 2025-08-07 PROCEDURE — 1159F MED LIST DOCD IN RCRD: CPT | Mod: CPTII,S$GLB,, | Performed by: NURSE PRACTITIONER

## 2025-08-07 PROCEDURE — 3078F DIAST BP <80 MM HG: CPT | Mod: CPTII,S$GLB,, | Performed by: NURSE PRACTITIONER

## 2025-08-07 PROCEDURE — 99214 OFFICE O/P EST MOD 30 MIN: CPT | Mod: S$GLB,,, | Performed by: NURSE PRACTITIONER

## 2025-08-07 PROCEDURE — 3044F HG A1C LEVEL LT 7.0%: CPT | Mod: CPTII,S$GLB,, | Performed by: NURSE PRACTITIONER

## 2025-08-07 PROCEDURE — 3074F SYST BP LT 130 MM HG: CPT | Mod: CPTII,S$GLB,, | Performed by: NURSE PRACTITIONER

## 2025-08-07 PROCEDURE — 2023F DILAT RTA XM W/O RTNOPTHY: CPT | Mod: CPTII,S$GLB,, | Performed by: NURSE PRACTITIONER

## 2025-08-07 PROCEDURE — 3008F BODY MASS INDEX DOCD: CPT | Mod: CPTII,S$GLB,, | Performed by: NURSE PRACTITIONER

## 2025-08-08 RX ORDER — AMOXICILLIN AND CLAVULANATE POTASSIUM 875; 125 MG/1; MG/1
1 TABLET, FILM COATED ORAL 2 TIMES DAILY
Qty: 14 TABLET | Refills: 0 | Status: SHIPPED | OUTPATIENT
Start: 2025-08-08 | End: 2025-08-15

## 2025-08-18 ENCOUNTER — OFFICE VISIT (OUTPATIENT)
Dept: HEPATOLOGY | Facility: CLINIC | Age: 45
End: 2025-08-18
Payer: COMMERCIAL

## 2025-08-18 DIAGNOSIS — I10 PRIMARY HYPERTENSION: ICD-10-CM

## 2025-08-18 DIAGNOSIS — E11.9 TYPE 2 DIABETES MELLITUS WITHOUT COMPLICATION, WITHOUT LONG-TERM CURRENT USE OF INSULIN: ICD-10-CM

## 2025-08-18 DIAGNOSIS — R74.8 ELEVATED LIVER ENZYMES: ICD-10-CM

## 2025-08-18 DIAGNOSIS — K75.81 METABOLIC DYSFUNCTION-ASSOCIATED STEATOHEPATITIS (MASH): Primary | ICD-10-CM

## 2025-08-18 DIAGNOSIS — K74.01 EARLY HEPATIC FIBROSIS: ICD-10-CM

## 2025-08-18 DIAGNOSIS — Z86.39 HISTORY OF OBESITY: ICD-10-CM

## 2025-08-18 DIAGNOSIS — E78.5 HYPERLIPIDEMIA, UNSPECIFIED HYPERLIPIDEMIA TYPE: ICD-10-CM

## 2025-08-18 PROBLEM — E78.00 PURE HYPERCHOLESTEROLEMIA: Status: ACTIVE | Noted: 2018-08-02

## 2025-08-18 PROBLEM — N60.11 BILATERAL FIBROCYSTIC BREAST CHANGES: Status: ACTIVE | Noted: 2018-08-15

## 2025-08-18 PROBLEM — Z72.0 TOBACCO USER: Status: ACTIVE | Noted: 2018-08-15

## 2025-08-18 PROBLEM — F41.9 ANXIETY: Status: ACTIVE | Noted: 2018-08-15

## 2025-08-18 PROBLEM — I34.1 MITRAL VALVE PROLAPSE: Status: ACTIVE | Noted: 2018-08-02

## 2025-08-18 PROBLEM — E87.6 HYPOKALEMIA: Status: ACTIVE | Noted: 2018-08-15

## 2025-08-18 PROBLEM — Z86.69 HISTORY OF CHIARI MALFORMATION: Status: ACTIVE | Noted: 2018-08-15

## 2025-08-18 PROBLEM — R00.2 PALPITATIONS: Status: ACTIVE | Noted: 2018-08-02

## 2025-08-18 PROBLEM — E89.40 POSTSURGICAL MENOPAUSE: Status: ACTIVE | Noted: 2018-08-15

## 2025-08-18 PROBLEM — F42.9 OBSESSIVE-COMPULSIVE DISORDER: Status: ACTIVE | Noted: 2018-08-15

## 2025-08-18 PROBLEM — N60.12 BILATERAL FIBROCYSTIC BREAST CHANGES: Status: ACTIVE | Noted: 2018-08-15

## 2025-08-18 PROBLEM — J02.0 STREP PHARYNGITIS: Status: ACTIVE | Noted: 2024-11-15

## 2025-08-18 PROBLEM — K21.9 GASTRIC REFLUX: Status: ACTIVE | Noted: 2018-08-15

## 2025-08-18 PROBLEM — M48.04 SPINAL STENOSIS OF THORACIC REGION: Status: ACTIVE | Noted: 2025-07-11

## 2025-08-18 PROBLEM — E66.9 OBESITY: Status: ACTIVE | Noted: 2018-08-02

## 2025-08-18 PROBLEM — Z91.09 ENVIRONMENTAL ALLERGIES: Status: ACTIVE | Noted: 2024-11-15

## 2025-08-18 PROBLEM — G93.5 CHIARI MALFORMATION TYPE I: Status: ACTIVE | Noted: 2018-08-15

## 2025-08-18 PROCEDURE — 1160F RVW MEDS BY RX/DR IN RCRD: CPT | Mod: CPTII,95,, | Performed by: NURSE PRACTITIONER

## 2025-08-18 PROCEDURE — 3044F HG A1C LEVEL LT 7.0%: CPT | Mod: CPTII,95,, | Performed by: NURSE PRACTITIONER

## 2025-08-18 PROCEDURE — 1159F MED LIST DOCD IN RCRD: CPT | Mod: CPTII,95,, | Performed by: NURSE PRACTITIONER

## 2025-08-18 PROCEDURE — 98006 SYNCH AUDIO-VIDEO EST MOD 30: CPT | Mod: 95,,, | Performed by: NURSE PRACTITIONER

## 2025-08-18 PROCEDURE — 4010F ACE/ARB THERAPY RXD/TAKEN: CPT | Mod: CPTII,95,, | Performed by: NURSE PRACTITIONER
